# Patient Record
Sex: MALE | Race: WHITE | NOT HISPANIC OR LATINO | Employment: OTHER | URBAN - METROPOLITAN AREA
[De-identification: names, ages, dates, MRNs, and addresses within clinical notes are randomized per-mention and may not be internally consistent; named-entity substitution may affect disease eponyms.]

---

## 2017-08-07 ENCOUNTER — OFFICE VISIT (OUTPATIENT)
Dept: HEMATOLOGY/ONCOLOGY | Facility: CLINIC | Age: 77
End: 2017-08-07
Payer: MEDICARE

## 2017-08-07 VITALS
HEART RATE: 61 BPM | BODY MASS INDEX: 25.54 KG/M2 | DIASTOLIC BLOOD PRESSURE: 66 MMHG | TEMPERATURE: 98 F | WEIGHT: 178 LBS | SYSTOLIC BLOOD PRESSURE: 126 MMHG | RESPIRATION RATE: 20 BRPM

## 2017-08-07 DIAGNOSIS — D75.1 POLYCYTHEMIA, SECONDARY: Primary | ICD-10-CM

## 2017-08-07 DIAGNOSIS — G47.30 SLEEP APNEA IN ADULT: ICD-10-CM

## 2017-08-07 PROBLEM — J98.8: Status: ACTIVE | Noted: 2017-08-07

## 2017-08-07 PROCEDURE — 1159F MED LIST DOCD IN RCRD: CPT | Mod: ,,, | Performed by: INTERNAL MEDICINE

## 2017-08-07 PROCEDURE — 3008F BODY MASS INDEX DOCD: CPT | Mod: ,,, | Performed by: INTERNAL MEDICINE

## 2017-08-07 PROCEDURE — 99214 OFFICE O/P EST MOD 30 MIN: CPT | Mod: ,,, | Performed by: INTERNAL MEDICINE

## 2017-08-07 RX ORDER — LEVOTHYROXINE SODIUM 88 UG/1
TABLET ORAL
COMMUNITY
Start: 2017-06-26 | End: 2019-09-24 | Stop reason: SDUPTHER

## 2017-08-07 RX ORDER — KETOCONAZOLE 20 MG/G
CREAM TOPICAL
COMMUNITY
Start: 2017-05-22 | End: 2018-12-13

## 2017-08-07 RX ORDER — BRIMONIDINE TARTRATE 1.5 MG/ML
SOLUTION/ DROPS OPHTHALMIC
COMMUNITY
Start: 2017-06-29 | End: 2020-09-10

## 2017-08-21 PROBLEM — D75.1 POLYCYTHEMIA, SECONDARY: Status: ACTIVE | Noted: 2017-08-21

## 2017-08-21 PROBLEM — J98.8: Status: RESOLVED | Noted: 2017-08-07 | Resolved: 2017-08-21

## 2017-08-21 PROBLEM — D75.1: Status: RESOLVED | Noted: 2017-08-07 | Resolved: 2017-08-21

## 2017-08-22 NOTE — PROGRESS NOTES
[facility] History & Physical    Subjective:      Patient ID:   Song Lopez  77 y.o. male  1940        Chief Complaint:   Results (hx of polycythemia)      HPI:  77 y.o. male with diagnosis of polycythemia, on chronic phlebotomy schedule.  Orders updated and written for 6-12 months.  Hx of gout, HTN, thyroid dx, and pneumothorax.  No co, SOB no, GAMEZ no.      ROS:   GEN: normal without any fever, night sweats or weight loss  HEENT: normal with no HA's, sore throat, stiff neck, changes in vision  CV: normal with no CP, SOB, PND, GAMEZ or orthopnea  PULM: See HPI.  no SOB, cough, hemoptysis, sputum or pleuritic pain  GI: normal with no abdominal pain, nausea, vomiting, constipation, diarrhea, melanotic stools, BRBPR  : normal with no hematuria, dysuria  BREAST: normal with no mass, discharge, pain  SKIN: normal with no rash, erythema, bruising, or swelling     Past Medical History:   Diagnosis Date    Chronic gout     Hypertension     Polycythemia     Thyroid disease      Past Surgical History:   Procedure Laterality Date    CAROTID ARTERY ANGIOPLASTY  right    carotid endarderectomy      CATARACT EXTRACTION, BILATERAL      CATARACT EXTRACTION, BILATERAL      left wrist surgery         Review of patient's allergies indicates:   Allergen Reactions    Penicillins Other (See Comments)     Skin turns red or appears sunburned     Social History     Social History    Marital status:      Spouse name: N/A    Number of children: N/A    Years of education: N/A     Occupational History    Not on file.     Social History Main Topics    Smoking status: Former Smoker     Packs/day: 1.00     Years: 25.00     Types: Cigarettes    Smokeless tobacco: Never Used    Alcohol use Yes      Comment: 6 per day    Drug use: No    Sexual activity: Not on file     Other Topics Concern    Not on file     Social History Narrative    No narrative on file         Current Outpatient Prescriptions:     allopurinol  (ZYLOPRIM) 100 MG tablet, Take 100 mg by mouth once daily., Disp: , Rfl:     aspirin (ECOTRIN) 81 MG EC tablet, Take 81 mg by mouth once daily., Disp: , Rfl:     brimonidine 0.15 % OPTH DROP (ALPHAGAN) 0.15 % ophthalmic solution, , Disp: , Rfl:     diltiazem (TIAZAC) 360 MG CpSR, Take 360 mg by mouth once daily., Disp: , Rfl:     latanoprost 0.005 % ophthalmic solution, Place 1 drop into both eyes every evening., Disp: , Rfl:     levothyroxine (SYNTHROID) 88 MCG tablet, , Disp: , Rfl:     lovastatin (MEVACOR) 20 MG tablet, Take 20 mg by mouth every evening., Disp: , Rfl:     ketoconazole (NIZORAL) 2 % cream, , Disp: , Rfl:     levothyroxine (SYNTHROID) 100 MCG tablet, Take 100 mcg by mouth once daily., Disp: , Rfl:           Objective:   Vitals:  Blood pressure 126/66, pulse 61, temperature 97.8 °F (36.6 °C), resp. rate 20, weight 80.7 kg (178 lb).    Physical Examination:   GEN: no apparent distress, comfortable; AAOx3  HEAD: atraumatic and normocephalic  EYES: no pallor, no icterus, no francisco j complexion  ENT: OMM, no pharyngeal erythema, no nasal discharge; no thrush  NECK: no masses, thyroid normal,  no LAD/LN's, supple  CV: RRR with no murmur; normal pulse; normal S1 and S2; no pedal edema  CHEST: Normal respiratory effort; CTAB; normal breath sounds; no wheeze or crackles  ABDOM: nontender and nondistended; soft; normal bowel sounds; no rebound/guarding, L/S NP  MUSC/Skeletal: ROM normal; no crepitus; joints normal  EXTREM: no clubbing, cyanosis, inflammation or swelling  SKIN: no rashes, lesions, ulcers, petechiae   : no marcelo  NEURO: grossly intact; motor/sensory WNL; AAOx3; no tremors  PSYCH: normal mood, affect and behavior  LYMPH: normal cervical, supraclavicular, axillary and groin LN's    Labs:     Radiology/Diagnostic Studies:    Assessment:   (1) 77 y.o. male with diagnosis of polycythemia, on phlebotomy schedule.  Orders updated and written         for phlebotomy.   RTC 6-12 years.      (2)Sleep apnea hx., thyroid dx,  HTN.          1. 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 Sleep apnea in adult        Plan:         Return in about 1 year (around 8/7/2018) for check of blood status after therapy.

## 2018-08-06 ENCOUNTER — TELEPHONE (OUTPATIENT)
Dept: HEMATOLOGY/ONCOLOGY | Facility: CLINIC | Age: 78
End: 2018-08-06

## 2018-08-06 ENCOUNTER — OFFICE VISIT (OUTPATIENT)
Dept: HEMATOLOGY/ONCOLOGY | Facility: CLINIC | Age: 78
End: 2018-08-06
Payer: MEDICARE

## 2018-08-06 VITALS
BODY MASS INDEX: 25.58 KG/M2 | SYSTOLIC BLOOD PRESSURE: 99 MMHG | HEIGHT: 69 IN | TEMPERATURE: 98 F | RESPIRATION RATE: 18 BRPM | DIASTOLIC BLOOD PRESSURE: 65 MMHG | HEART RATE: 66 BPM | WEIGHT: 172.69 LBS

## 2018-08-06 DIAGNOSIS — M16.11 OSTEOARTHRITIS OF RIGHT HIP, UNSPECIFIED OSTEOARTHRITIS TYPE: ICD-10-CM

## 2018-08-06 PROCEDURE — 99214 OFFICE O/P EST MOD 30 MIN: CPT | Mod: ,,, | Performed by: INTERNAL MEDICINE

## 2018-08-06 NOTE — PROGRESS NOTES
[facility] History & Physical    Subjective:      Patient ID:   Song Lopez  78 y.o. male  1940  [place]      Chief Complaint:   polycythemia    HPI:  78 y.o. male with diagnosis of polycythemia, on chronic phlebotomy schedule.  Orders updated and written for 12 months.  Hx of gout, HTN, thyroid dx, and pneumothorax.  No co, SOB no, GAMEZ no.  He has sleep apnea syndrome and uses a C Pap machine.    He has some pain symptoms at the right hip, x-ray was done per Dr. Kovacs, he was told that he had early arthritic change at the hip.    ROS:   GEN: normal without any fever, night sweats or weight loss  HEENT: normal with no HA's, sore throat, stiff neck, changes in vision  CV: normal with no CP, SOB, PND, GAMEZ or orthopnea  PULM: See HPI.  no SOB, cough, hemoptysis, sputum or pleuritic pain  GI: normal with no abdominal pain, nausea, vomiting, constipation, diarrhea, melanotic stools, BRBPR  : normal with no hematuria, dysuria  BREAST: normal with no mass, discharge, pain  SKIN: normal with no rash, erythema, bruising, or swelling     Past Medical History:   Diagnosis Date    Chronic gout     Hypertension     Polycythemia     Thyroid disease      Past Surgical History:   Procedure Laterality Date    CAROTID ARTERY ANGIOPLASTY  right    carotid endarderectomy      CATARACT EXTRACTION, BILATERAL      CATARACT EXTRACTION, BILATERAL      left wrist surgery         Review of patient's allergies indicates:   Allergen Reactions    Penicillins Other (See Comments)     Skin turns red or appears sunburned     Social History     Social History    Marital status:      Spouse name: N/A    Number of children: N/A    Years of education: N/A     Occupational History    Not on file.     Social History Main Topics    Smoking status: Former Smoker     Packs/day: 1.00     Years: 25.00     Types: Cigarettes    Smokeless tobacco: Never Used    Alcohol use Yes      Comment: 6 per day    Drug use: No    Sexual  "activity: Not on file     Other Topics Concern    Not on file     Social History Narrative    No narrative on file         Current Outpatient Prescriptions:     allopurinol (ZYLOPRIM) 100 MG tablet, Take 100 mg by mouth once daily., Disp: , Rfl:     aspirin (ECOTRIN) 81 MG EC tablet, Take 81 mg by mouth once daily., Disp: , Rfl:     brimonidine 0.15 % OPTH DROP (ALPHAGAN) 0.15 % ophthalmic solution, , Disp: , Rfl:     diltiazem (TIAZAC) 360 MG CpSR, Take 360 mg by mouth once daily., Disp: , Rfl:     ketoconazole (NIZORAL) 2 % cream, , Disp: , Rfl:     latanoprost 0.005 % ophthalmic solution, Place 1 drop into both eyes every evening., Disp: , Rfl:     levothyroxine (SYNTHROID) 100 MCG tablet, Take 100 mcg by mouth once daily., Disp: , Rfl:     levothyroxine (SYNTHROID) 88 MCG tablet, , Disp: , Rfl:     lovastatin (MEVACOR) 20 MG tablet, Take 20 mg by mouth every evening., Disp: , Rfl:           Objective:   Vitals:  Blood pressure 99/65, pulse 66, temperature 98 °F (36.7 °C), resp. rate 18, height 5' 9" (1.753 m), weight 78.3 kg (172 lb 11.2 oz).    Physical Examination:   GEN: no apparent distress, comfortable  HEAD: atraumatic and normocephalic  EYES: no pallor, no icterus, no francisco j complexion  ENT: no pharyngeal erythema, no nasal discharge  NECK: no masses, thyroid normal,  no LAD/LN's, supple  CV: RRR with no murmur; normal pulse; normal S1 and S2; no pedal edema  CHEST: Normal respiratory effort; CTAB; distant breath sounds; no wheeze or crackles  ABDOM: nontender and nondistended; soft; normal bowel sounds; no rebound/guarding, L/S NP  MUSC/Skeletal: ROM normal; no crepitus; joints normal  EXTREM: no clubbing, cyanosis, inflammation or swelling  SKIN: no rashes, lesions, ulcers, petechiae   : no marcelo  NEURO: grossly intact; motor/sensory WNL; no tremors  PSYCH: normal mood, affect and behavior  LYMPH: normal cervical, supraclavicular, axillary and groin LN's    Lab per Dr. Kovacs's office in July " showed hemoglobin 15.9, hematocrit 52.6, MCV 70, white blood cell count 8400, and platelet count of 395,000. LFTs were normal, TSH was normal, creatinine was normal and urinalysis was unremarkable    Assessment:   (1) 78 y.o. male with diagnosis of polycythemia, on phlebotomy schedule.  Orders updated and written for phlebotomy for 1 year to maintain hematocrit at 48 or less.    (2)Sleep apnea hx., thyroid dx,  HTN.  He uses a C Pap machine.    (3)R hip sx, DJD, as per Dr. Kovacs.      RTC 1 year.

## 2018-08-06 NOTE — LETTER
August 6, 2018      Jake Kovacs MD  [address]  [address]  [facility]  [place] [address]           [facility] - Hematology [facility]  [address]  [address]  [place] [address]  Phone: [phone]  Fax: [phone]          Patient: Song Lopez   MR Number: [id number]   YOB: 1940   Date of Visit: 8/6/2018       Dear Dr. Jake Kovacs:    Thank you for referring Song Lopez to me for evaluation. Attached you will find relevant portions of my assessment and plan of care.    If you have questions, please do not hesitate to call me. I look forward to following Song Lopez along with you.    Sincerely,    SNOW Clark MD    Enclosure  CC:  No Recipients    If you would like to receive this communication electronically, please contact [email or web address] or [phone] to request more information on [employer] access.    For providers and/or their staff who would like to refer a patient to [facility], please contact us through our one-stop-shop provider referral line, [facility], at [phone].    If you feel you have received this communication in error or would no longer like to receive these types of communications, please e-mail [email or web address]

## 2018-08-06 NOTE — TELEPHONE ENCOUNTER
Please get me the report on chest x-ray and CAT scan of chest and right hip x-ray from [facility]

## 2018-09-18 ENCOUNTER — TELEPHONE (OUTPATIENT)
Dept: PAIN MEDICINE | Facility: CLINIC | Age: 78
End: 2018-09-18

## 2018-09-18 NOTE — TELEPHONE ENCOUNTER
----- Message from Bernie Holly sent at 9/18/2018  1:38 PM CDT -----  Contact: Bernie with Dr Jake Gibbs just wants to make sure you received the patients medicals records from his PCP for his appt with the Dr Spears tomorrow.  Call back at [phone] after 2 PM.  Thanks

## 2018-09-18 NOTE — TELEPHONE ENCOUNTER
----- Message from Faith Guerreroan sent at 9/18/2018 11:07 AM CDT -----  Contact: wife Em   Wife Em called, she wanted to make sure Dr. Kovacs office sent over patient test results. Please call back [phone] (home)

## 2018-09-19 ENCOUNTER — OFFICE VISIT (OUTPATIENT)
Dept: PAIN MEDICINE | Facility: CLINIC | Age: 78
End: 2018-09-19
Payer: MEDICARE

## 2018-09-19 VITALS
BODY MASS INDEX: 25.48 KG/M2 | HEIGHT: 69 IN | DIASTOLIC BLOOD PRESSURE: 69 MMHG | WEIGHT: 172 LBS | SYSTOLIC BLOOD PRESSURE: 132 MMHG | HEART RATE: 66 BPM

## 2018-09-19 DIAGNOSIS — M50.30 DDD (DEGENERATIVE DISC DISEASE), CERVICAL: Primary | ICD-10-CM

## 2018-09-19 DIAGNOSIS — M54.12 CERVICAL RADICULITIS: ICD-10-CM

## 2018-09-19 PROCEDURE — 99203 OFFICE O/P NEW LOW 30 MIN: CPT | Mod: S$PBB,,, | Performed by: ANESTHESIOLOGY

## 2018-09-19 PROCEDURE — 99999 PR PBB SHADOW E&M-EST. PATIENT-LVL III: CPT | Mod: PBBFAC,,, | Performed by: ANESTHESIOLOGY

## 2018-09-19 PROCEDURE — 99213 OFFICE O/P EST LOW 20 MIN: CPT | Mod: PBBFAC,PN | Performed by: ANESTHESIOLOGY

## 2018-09-19 PROCEDURE — 1101F PT FALLS ASSESS-DOCD LE1/YR: CPT | Mod: CPTII,,, | Performed by: ANESTHESIOLOGY

## 2018-09-19 RX ORDER — DOXYCYCLINE 100 MG/1
CAPSULE ORAL
COMMUNITY
Start: 2018-08-08 | End: 2018-12-13

## 2018-09-19 RX ORDER — CIPROFLOXACIN 500 MG/1
TABLET ORAL
COMMUNITY
Start: 2018-09-04 | End: 2018-12-13

## 2018-09-19 RX ORDER — METRONIDAZOLE 500 MG/1
TABLET ORAL
COMMUNITY
Start: 2018-09-04 | End: 2018-12-13

## 2018-09-19 RX ORDER — METHYLPREDNISOLONE 4 MG/1
TABLET ORAL
COMMUNITY
Start: 2018-08-13 | End: 2018-12-13

## 2018-09-19 RX ORDER — TIZANIDINE 4 MG/1
TABLET ORAL
COMMUNITY
Start: 2018-08-13 | End: 2020-01-29

## 2018-09-19 NOTE — PROGRESS NOTES
This note was completed with dictation software and grammatical errors may exist.    Referring Physician: Roma Downs    PCP: Jake Kovacs MD      CC:  Right-sided neck pain    HPI:   Song Lopez is a 78 y.o. male presents with right-sided neck pain. Pain has been present for over 3 months.  No recent traumatic incident.  He has intermittent burning, shooting pain in his posterior neck.  Pain radiates to his right shoulder.  Pain worsens with extension, bending, lateral rotation.  Pain improves with rest and heat.  He has tried home exercises with mild benefits.  He recently had x-rays of his cervical spine.  He is referred to us for other treatment options.  However, his neck pain has gradually improved conservative therapy.  He wishes to defer any further interventions at this time. He denies any worsening weakness.  No bowel bladder changes.    ROS:  CONSTITUTIONAL: No fevers, chills, night sweats, wt. loss, appetite changes  SKIN: no rashes or itching  ENT: No headaches, head trauma, vision changes, or eye pain  LYMPH NODES: None noticed   CV: No chest pain, palpitations.   RESP: No shortness of breath, dyspnea on exertion, cough, wheezing, or hemoptysis  GI: No nausea, emesis, diarrhea, constipation, melena, hematochezia, pain.    : No dysuria, hematuria, urgency, or frequency   HEME: No easy bruising, bleeding problems  PSYCHIATRIC: No depression, anxiety, psychosis, hallucinations.  NEURO: No seizures, memory loss, dizziness or difficulty sleeping  MSK:  Positive HPI      Past Medical History:   Diagnosis Date    Chronic gout     Hypertension     Polycythemia     Thyroid disease      Past Surgical History:   Procedure Laterality Date    CAROTID ARTERY ANGIOPLASTY  right    carotid endarderectomy      CATARACT EXTRACTION, BILATERAL      CATARACT EXTRACTION, BILATERAL      left wrist surgery       History reviewed. No pertinent family history.  Social History     Socioeconomic History  "   Marital status:      Spouse name: None    Number of children: None    Years of education: None    Highest education level: None   Social Needs    Financial resource strain: None    Food insecurity - worry: None    Food insecurity - inability: None    Transportation needs - medical: None    Transportation needs - non-medical: None   Occupational History    None   Tobacco Use    Smoking status: Former Smoker     Packs/day: 1.00     Years: 25.00     Pack years: 25.00     Types: Cigarettes    Smokeless tobacco: Never Used   Substance and Sexual Activity    Alcohol use: Yes     Comment: 6 per day    Drug use: No    Sexual activity: None   Other Topics Concern    None   Social History Narrative    None         Medications/Allergies: See med card    Vitals:    09/19/18 1323   BP: 132/69   Pulse: 66   Weight: 78 kg (172 lb)   Height: 5' 9" (1.753 m)   PainSc: 0-No pain   PainLoc: Neck         Physical exam:    GENERAL: A and O x3, the patient appears well groomed and is in no acute distress.  Skin: No rashes or obvious lesions  HEENT: normocephalic, atraumatic  CARDIOVASCULAR:  Palpable peripheral pulses  LUNGS: easy work of breathing  ABDOMEN: soft, nontender   UPPER EXTREMITIES: Normal alignment, normal range of motion, no atrophy, no skin changes,  hair growth and nail growth normal and equal bilaterally. No swelling, no tenderness.    LOWER EXTREMITIES:  Normal alignment, normal range of motion, no atrophy, no skin changes,  hair growth and nail growth normal and equal bilaterally. No swelling, no tenderness.  CERVICAL SPINE:  Cervical spine: ROM is full in flexion, extension and lateral rotation with mild increased pain.  Spurling's maneuver causes mild neck pain to either side.  Myofascial exam: No Tenderness to palpation across cervical paraspinous region bilaterally.    MENTAL STATUS: normal orientation, speech, language, and fund of knowledge for social situation.  Emotional state " appropriate.    CRANIAL NERVES:  II:  PERRL bilaterally,   III,IV,VI: EOMI.    V:  Facial sensation equal bilaterally  VII:  Facial motor function normal.  VIII:  Hearing equal to finger rub bilaterally  IX/X: Gag normal, palate symmetric  XI:  Shoulder shrug equal, head turn equal  XII:  Tongue midline without fasciculations      MOTOR: Tone and bulk: normal bilateral upper and lower Strength: normal   Delt Bi Tri WE WF     R 5 5 5 5 5 5   L 5 5 5 5 5 5     IP ADD ABD Quad TA Gas HAM  R 5 5 5 5 5 5 5  L 5 5 5 5 5 5 5    SENSATION: Light touch and pinprick intact bilaterally  REFLEXES: normal, symmetric, nonbrisk.  Toes down, no clonus. No hoffmans.  GAIT: normal rise, base, steps, and arm swing.        Imaging:  Requesting     Assessment:  Patient referred for neck pain  1. DDD (degenerative disc disease), cervical    2. Cervical radiculitis          Plan:  1. I have stressed the importance of physical activity and exercise to improve overall health  2. Request recent cervical xrays  3.  Neck pain is currently improved conservative therapy.  May consider cervical epidural steroid injection if neck pain worsens.  4.  He will follow up as needed      Thank you for referring this interesting patient, and I look forward to continuing to collaborate in his care.

## 2018-09-26 ENCOUNTER — DOCUMENTATION ONLY (OUTPATIENT)
Dept: PAIN MEDICINE | Facility: CLINIC | Age: 78
End: 2018-09-26

## 2018-12-13 ENCOUNTER — OFFICE VISIT (OUTPATIENT)
Dept: DERMATOLOGY | Facility: CLINIC | Age: 78
End: 2018-12-13
Payer: MEDICARE

## 2018-12-13 VITALS — BODY MASS INDEX: 25.48 KG/M2 | WEIGHT: 172 LBS | HEIGHT: 69 IN

## 2018-12-13 DIAGNOSIS — Z12.83 SKIN CANCER SCREENING: ICD-10-CM

## 2018-12-13 DIAGNOSIS — L57.0 AK (ACTINIC KERATOSIS): Primary | ICD-10-CM

## 2018-12-13 DIAGNOSIS — D49.9 NEOPLASM: ICD-10-CM

## 2018-12-13 PROCEDURE — 99999 PR PBB SHADOW E&M-EST. PATIENT-LVL III: CPT | Mod: PBBFAC,,, | Performed by: DERMATOLOGY

## 2018-12-13 PROCEDURE — 99202 OFFICE O/P NEW SF 15 MIN: CPT | Mod: 25,S$GLB,, | Performed by: DERMATOLOGY

## 2018-12-13 PROCEDURE — 17004 DESTROY PREMAL LESIONS 15/>: CPT | Mod: S$GLB,,, | Performed by: DERMATOLOGY

## 2018-12-13 PROCEDURE — 1101F PT FALLS ASSESS-DOCD LE1/YR: CPT | Mod: CPTII,S$GLB,, | Performed by: DERMATOLOGY

## 2018-12-13 NOTE — LETTER
December 13, 2018      Jake Kovacs MD  [address]  [address]  [facility]  [place] [address]           [place] [facility] D[facility]atology  [address]  [place] [address]  Phone: [phone]          Patient: Song Lopez   MR Number: [id number]   YOB: 1940   Date of Visit: 12/13/2018       Dear Dr. Jake Kovacs:    Thank you for referring Song Lopez to me for evaluation. Attached you will find relevant portions of my assessment and plan of care.    If you have questions, please do not hesitate to call me. I look forward to following Song Lopez along with you.    Sincerely,    Chino Hoff MD    Enclosure  CC:  No Recipients    If you would like to receive this communication electronically, please contact [email or web address] or [phone] to request more information on [employer] Link access.    For providers and/or their staff who would like to refer a patient to [facility], please contact us through our one-stop-shop provider referral line, [facility], at [phone].    If you feel you have received this communication in error or would no longer like to receive these types of communications, please e-mail [email or web address]

## 2018-12-13 NOTE — PATIENT INSTRUCTIONS
CRYOSURGERY      Your doctor has used a method called cryosurgery to treat your skin condition. Cryosurgery refers to the use of very cold substances to treat a variety of skin conditions such as warts, pre-skin cancers, molluscum contagiosum, sun spots, and several benign growths. The substance we use in cryosurgery is liquid nitrogen and is so cold (-195 degrees Celsius) that is burns when administered.     Following treatment in the office, the skin may immediately burn and become red. You may find the area around the lesion is affected as well. It is sometimes necessary to treat not only the lesion, but a small area of the surrounding normal skin to achieve a good response.     A blister, and even a blood filled blister, may form after treatment.   This is a normal response. If the blister is painful, it is acceptable to sterilize a needle and with rubbing alcohol and gently pop the blister. It is important that you gently wash the area with soap and warm water as the blister fluid may contain wart virus if a wart was treated. Do no remove the roof of the blister.     The area treated can take anywhere from 1-3 weeks to heal. Healing time depends on the kind skin lesion treated, the location, and how aggressively the lesion was treated. It is recommended that the areas treated are covered with Vaseline or bacitracin ointment and a band-aid. If a band-aid is not practical, just ointment applied several times per day will do. Keeping these areas moist will speed the healing time.    Treatment with liquid nitrogen can leave a scar. In dark skin, it may be a light or dark scar, in light skin it may be a white or pink scar. These will generally fade with time, but may never go away completely.     If you have any concerns after your treatment, please feel free to call the office.       [address] [facility] [place] [address]/ [phone] FAX/ [email or web address]      Understanding Actinic  Keratosis     Wear a hat that protects your face and ears from the sun.     Actinic keratosis is a skin growth caused by sun damage. These growths are not cancer, but they may develop into skin cancer (precancerous). Many people get these growths, especially as they age. This is because of cumulative sun exposure over years. Multiple growths are called actinic keratoses.  What causes actinic keratosis?  Sun damage causes actinic keratosis. These growths usually appear on skin that is most often exposed to the sun, such as the face, ears, or back of the hands. People who easily sunburn are likely to develop actinic keratoses. Actinic keratoses often appear later in life from cumulative, extensive sun exposure.  What are the symptoms of actinic keratosis?  Actinic keratosis growths may be described as:  · Rough, like sandpaper  · Wart-like  · Scaly or scabby  · More easily felt than seen  They may appear singly or in clusters. They may start out as red patches, and the skin around them may be discolored.  Actinic keratoses usually are not painful. For some people they may make the skin feel tender.  How is actinic keratosis treated?  Because actinic keratoses may develop into skin cancer, a healthcare provider should look at them. Your healthcare provider may choose to remove one or more of these growths. It may be examined under a microscope. This is called a biopsy. You may also wish to have actinic keratoses removed if they bother you. They can be removed in several ways:  · By using a medicine on the skin such as 5 fluorouracil or imiquimod  · By removing them with a scalpel  · By freezing them with liquid nitrogen  How can I prevent actinic keratoses?  Avoiding sun damage to your skin is the best way to prevent actinic keratoses. Here are some ways to protect your skin:  · Use sunscreen with an SPF of 30 or higher on exposed skin when you are outside.  · Wear a hat to protect your face and scalp. Consider wearing  clothing that covers your arms and legs.  · Avoid the sun in the middle of the day, when sunlight is most direct.  · Be aware of how long you have been out in the sun. Reapply sunscreen according to package directions.  · Do not use tanning beds.  What are the complications of actinic keratosis?  Actinic keratoses are a sign of skin damage. They may become cancerous. Its a good idea to ask your healthcare provider to check new skin growths. Report any skin problem that concerns you.  When should I call my healthcare provider?  Call your healthcare provider right away if any of these occur:  · You have an actinic keratosis sore that does not respond to treatment  · You have an actinic keratosis sore that does not heal within a few weeks, or heals and then comes back  · You have a skin growth that is changing in size, shape, or color  · You have a skin growth that looks different on one side from the other  · You have a skin growth that is not the same color throughout   Date Last Reviewed: 5/1/2016  © 2938-2810 [employer]. [address] [place] [address], [place] [address]. All rights reserved. This information is not intended as a substitute for professional medical care. Always follow your healthcare professional's instructions.

## 2018-12-13 NOTE — PROGRESS NOTES
Subjective:       Patient ID:  Song Lopez is a 78 y.o. male who presents for   Chief Complaint   Patient presents with    Spot     scalp     New patient here today for spots to scalp over last 6 months. Itch at times.  Used ketoconazole about 1 year ago per PCP. Helped with itch short term.   Has not used any treatment in recent months. Prior treatment 3 years ago with cryo.      Hx of some type of skin cancer to left neck treated with surgery by Dr. Ross 2015.  Grew up in [place].  .  Never wears hat or sunblock.    Has crusty spot of the right arm x 1-2 months.  Had a skin scrape there that healed.    Has hard spot of the scalp/f head.  No bleed.      Spot         Review of Systems   Constitutional: Negative for fever, weight loss and weight gain.   HENT: Negative for sore throat and mouth sores.    Eyes: Negative for itching and eye watering.   Respiratory: Negative for cough.    Musculoskeletal: Negative for myalgias and arthralgias.   Skin: Positive for itching and dry skin. Negative for daily sunscreen use, activity-related sunscreen use and wears hat.   Hematologic/Lymphatic: Does not bruise/bleed easily.        Objective:    Physical Exam   Constitutional: He appears well-developed and well-nourished.   Eyes: No conjunctival no injection.   Neurological: He is oriented to person, place, and time.   Psychiatric: He has a normal mood and affect.   Skin:   Areas Examined (abnormalities noted in diagram):   Scalp / Hair Palpated and Inspected  Head / Face Inspection Performed  Neck Inspection Performed  RUE Inspected  LUE Inspection Performed  Nails and Digits Inspection Performed                   Diagram Legend     Erythematous scaling macule/papule c/w actinic keratosis       Vascular papule c/w angioma      Pigmented verrucoid papule/plaque c/w seborrheic keratosis      Yellow umbilicated papule c/w sebaceous hyperplasia      Irregularly shaped tan macule c/w lentigo     1-2  mm smooth white papules consistent with Milia      Movable subcutaneous cyst with punctum c/w epidermal inclusion cyst      Subcutaneous movable cyst c/w pilar cyst      Firm pink to brown papule c/w dermatofibroma      Pedunculated fleshy papule(s) c/w skin tag(s)      Evenly pigmented macule c/w junctional nevus     Mildly variegated pigmented, slightly irregular-bordered macule c/w mildly atypical nevus      Flesh colored to evenly pigmented papule c/w intradermal nevus       Pink pearly papule/plaque c/w basal cell carcinoma      Erythematous hyperkeratotic cursted plaque c/w SCC      Surgical scar with no sign of skin cancer recurrence      Open and closed comedones      Inflammatory papules and pustules      Verrucoid papule consistent consistent with wart     Erythematous eczematous patches and plaques     Dystrophic onycholytic nail with subungual debris c/w onychomycosis     Umbilicated papule    Erythematous-base heme-crusted tan verrucoid plaque consistent with inflamed seborrheic keratosis     Erythematous Silvery Scaling Plaque c/w Psoriasis     See annotation      Assessment / Plan:        AK (actinic keratosis)  Cryosurgery Procedure Note    Verbal consent from the patient is obtained including, but not limited to, risk of hypopigmentation/hyperpigmentation, scar, recurrence of lesion. The patient is aware of the precancerous quality and need for treatment of these lesions. Liquid nitrogen cryosurgery is applied to the 17 actinic keratoses, as detailed in the physical exam, to produce a freeze injury. The patient is aware that blisters may form and is instructed on wound care with gentle cleansing and use of vaseline ointment to keep moist until healed. The patient is supplied a handout on cryosurgery and is instructed to call if lesions do not completely resolve.  Discussed all of the following:  Actinic keratosis is a skin growth caused by sun damage. These growths are not cancer, but they may  develop into skin cancer (precancerous). Many people get these growths, especially as they age. This is because of cumulative sun exposure over years. Multiple growths are called actinic keratoses.    Patient instructed in importance in daily sun protection of at least spf 30. Sun avoidance and topical protection discussed.     Patient encouraged to wear hat for all outdoor exposure.     Also discussed sun protective clothing.    Neoplasm  Watch frontal scalp crust.  Cryo today.  May need bx if not resolved.  Also watch r f arm.  Cryo today.    Skin cancer screening  Plan at fu appt.  Recommended to pt.         Follow-up in about 6 weeks (around 1/24/2019).

## 2019-01-15 ENCOUNTER — TELEPHONE (OUTPATIENT)
Dept: DERMATOLOGY | Facility: CLINIC | Age: 79
End: 2019-01-15

## 2019-01-15 NOTE — TELEPHONE ENCOUNTER
Patient states he received letter regarding appt in mail stating he needs to bring all mr and imaging to appt.  INformed patient this is a general letter sent by someone meaning if the record is pertinent to appt--like seeing ortho and had an xray, but in this case, no records needed.

## 2019-01-21 ENCOUNTER — OFFICE VISIT (OUTPATIENT)
Dept: DERMATOLOGY | Facility: CLINIC | Age: 79
End: 2019-01-21
Payer: MEDICARE

## 2019-01-21 DIAGNOSIS — D49.9 NEOPLASM: ICD-10-CM

## 2019-01-21 DIAGNOSIS — L57.0 AK (ACTINIC KERATOSIS): Primary | ICD-10-CM

## 2019-01-21 PROCEDURE — 11102 PR TANGENTIAL BIOPSY, SKIN, SINGLE LESION: ICD-10-PCS | Mod: S$GLB,,, | Performed by: DERMATOLOGY

## 2019-01-21 PROCEDURE — 99999 PR PBB SHADOW E&M-EST. PATIENT-LVL I: CPT | Mod: PBBFAC,,, | Performed by: DERMATOLOGY

## 2019-01-21 PROCEDURE — 99213 OFFICE O/P EST LOW 20 MIN: CPT | Mod: 25,S$GLB,, | Performed by: DERMATOLOGY

## 2019-01-21 PROCEDURE — 99999 PR PBB SHADOW E&M-EST. PATIENT-LVL I: ICD-10-PCS | Mod: PBBFAC,,, | Performed by: DERMATOLOGY

## 2019-01-21 PROCEDURE — 88305 TISSUE EXAM BY PATHOLOGIST: CPT | Performed by: PATHOLOGY

## 2019-01-21 PROCEDURE — 1101F PT FALLS ASSESS-DOCD LE1/YR: CPT | Mod: CPTII,S$GLB,, | Performed by: DERMATOLOGY

## 2019-01-21 PROCEDURE — 99213 PR OFFICE/OUTPT VISIT, EST, LEVL III, 20-29 MIN: ICD-10-PCS | Mod: 25,S$GLB,, | Performed by: DERMATOLOGY

## 2019-01-21 PROCEDURE — 88305 TISSUE SPECIMEN TO PATHOLOGY, DERMATOLOGY: ICD-10-PCS | Mod: 26,,, | Performed by: PATHOLOGY

## 2019-01-21 PROCEDURE — 1101F PR PT FALLS ASSESS DOC 0-1 FALLS W/OUT INJ PAST YR: ICD-10-PCS | Mod: CPTII,S$GLB,, | Performed by: DERMATOLOGY

## 2019-01-21 PROCEDURE — 11102 TANGNTL BX SKIN SINGLE LES: CPT | Mod: S$GLB,,, | Performed by: DERMATOLOGY

## 2019-01-21 NOTE — PROGRESS NOTES
Subjective:       Patient ID:  Song Lopez is a 78 y.o. male who presents for No chief complaint on file.    12/2018  AK (actinic keratosis)  Cryosurgery Procedure Note     Verbal consent from the patient is obtained including, but not limited to, risk of hypopigmentation/hyperpigmentation, scar, recurrence of lesion. The patient is aware of the precancerous quality and need for treatment of these lesions. Liquid nitrogen cryosurgery is applied to the 17 actinic keratoses, as detailed in the physical exam, to produce a freeze injury. The patient is aware that blisters may form and is instructed on wound care with gentle cleansing and use of vaseline ointment to keep moist until healed. The patient is supplied a handout on cryosurgery and is instructed to call if lesions do not completely resolve.  Discussed all of the following:  Actinic keratosis is a skin growth caused by sun damage. These growths are not cancer, but they may develop into skin cancer (precancerous). Many people get these growths, especially as they age. This is because of cumulative sun exposure over years. Multiple growths are called actinic keratoses.     Patient instructed in importance in daily sun protection of at least spf 30. Sun avoidance and topical protection discussed.      Patient encouraged to wear hat for all outdoor exposure.      Also discussed sun protective clothing.     Neoplasm  Watch frontal scalp crust.  Cryo today.  May need bx if not resolved.  Also watch r f arm.  Cryo today.     Skin cancer screening  Plan at  appt.  Recommended to pt.    Returns today for follow up Ak's.  Right temple previously treated with cryo remains scaly. Previously treated area to right arm gone.          Review of Systems   Constitutional: Negative for fever.   HENT: Negative for mouth sores.    Eyes: Negative for itching and eye watering.   Respiratory: Negative for cough.    Genitourinary: Negative for genital sores and genital itching.         Objective:    Physical Exam   Constitutional: He appears well-developed and well-nourished.   Eyes: No conjunctival no injection.   Neurological: He is alert and oriented to person, place, and time.   Psychiatric: He has a normal mood and affect.   Skin:   Areas Examined (abnormalities noted in diagram):   Scalp / Hair Palpated and Inspected  Head / Face Inspection Performed  Neck Inspection Performed  RUE Inspected  LUE Inspection Performed                   Diagram Legend     Erythematous scaling macule/papule c/w actinic keratosis       Vascular papule c/w angioma      Pigmented verrucoid papule/plaque c/w seborrheic keratosis      Yellow umbilicated papule c/w sebaceous hyperplasia      Irregularly shaped tan macule c/w lentigo     1-2 mm smooth white papules consistent with Milia      Movable subcutaneous cyst with punctum c/w epidermal inclusion cyst      Subcutaneous movable cyst c/w pilar cyst      Firm pink to brown papule c/w dermatofibroma      Pedunculated fleshy papule(s) c/w skin tag(s)      Evenly pigmented macule c/w junctional nevus     Mildly variegated pigmented, slightly irregular-bordered macule c/w mildly atypical nevus      Flesh colored to evenly pigmented papule c/w intradermal nevus       Pink pearly papule/plaque c/w basal cell carcinoma      Erythematous hyperkeratotic cursted plaque c/w SCC      Surgical scar with no sign of skin cancer recurrence      Open and closed comedones      Inflammatory papules and pustules      Verrucoid papule consistent consistent with wart     Erythematous eczematous patches and plaques     Dystrophic onycholytic nail with subungual debris c/w onychomycosis     Umbilicated papule    Erythematous-base heme-crusted tan verrucoid plaque consistent with inflamed seborrheic keratosis     Erythematous Silvery Scaling Plaque c/w Psoriasis     See annotation          Assessment / Plan:      Pathology Orders:     Normal Orders This Visit    Tissue Specimen To  Pathology, Dermatology     Questions:    Directional Terms:  Other(comment)    Clinical information:  ro scc    Specific Site:  r hairline/temple        AK (actinic keratosis)  Discussed all of the following:  Actinic keratosis is a skin growth caused by sun damage. These growths are not cancer, but they may develop into skin cancer (precancerous). Many people get these growths, especially as they age. This is because of cumulative sun exposure over years. Multiple growths are called actinic keratoses.  Better sp cryo!  Patient instructed to start Amlactin cream or lotion nightly to AK prone areas.  Warned of skin irritation and to decrease frequency of usage if this occurs.  Watch f head, scalp, arms.    Neoplasm  Shave biopsy procedure note:    Shave biopsy performed after verbal consent including risk of infection, scar, recurrence, need for additional treatment of site. Area prepped with alcohol, anesthetized with approximately 1.0cc of 1% lidocaine with epinephrine. Lesional tissue shaved with razor blade. Hemostasis achieved with application of aluminum chloride followed by hyfrecation. No complications. Dressing applied. Wound care explained.    Discussed with patient the etiology and pathogenesis of the disease and possible treatments and aggravators.    Discussed Mohs surgery with patient if biopsy is positive for skin cancer.  Reviewed options of doing Mohs with Dr. Vance in [place] versus with Dr. Orourke in [place] versus regular excision with me here in [place].  Patient to consider.             Follow-up if symptoms worsen or fail to improve, for Post biopsy results.

## 2019-01-21 NOTE — PATIENT INSTRUCTIONS

## 2019-02-06 ENCOUNTER — PROCEDURE VISIT (OUTPATIENT)
Dept: DERMATOLOGY | Facility: CLINIC | Age: 79
End: 2019-02-06
Payer: MEDICARE

## 2019-02-06 VITALS — WEIGHT: 172 LBS | HEIGHT: 69 IN | BODY MASS INDEX: 25.48 KG/M2

## 2019-02-06 DIAGNOSIS — C44.320 SCC (SQUAMOUS CELL CARCINOMA), FACE: Primary | ICD-10-CM

## 2019-02-06 PROCEDURE — 99499 UNLISTED E&M SERVICE: CPT | Mod: S$GLB,,, | Performed by: DERMATOLOGY

## 2019-02-06 PROCEDURE — 99499 NO LOS: ICD-10-PCS | Mod: S$GLB,,, | Performed by: DERMATOLOGY

## 2019-02-06 PROCEDURE — 88305 TISSUE SPECIMEN TO PATHOLOGY, DERMATOLOGY: ICD-10-PCS | Mod: 26,,, | Performed by: PATHOLOGY

## 2019-02-06 PROCEDURE — 12051 INTMD RPR FACE/MM 2.5 CM/<: CPT | Mod: 51,S$GLB,, | Performed by: DERMATOLOGY

## 2019-02-06 PROCEDURE — 12051 PR INTERMED WOUND REPAIR FACE/EAR/EYELID/NOSE/LIP/MUC MEBR, 2.5CM OR LESS: ICD-10-PCS | Mod: 51,S$GLB,, | Performed by: DERMATOLOGY

## 2019-02-06 PROCEDURE — 11642 EXC F/E/E/N/L MAL+MRG 1.1-2: CPT | Mod: S$GLB,,, | Performed by: DERMATOLOGY

## 2019-02-06 PROCEDURE — 11642 PR EXC SKIN MALIG 1.1-2 CM FACE,FACIAL: ICD-10-PCS | Mod: S$GLB,,, | Performed by: DERMATOLOGY

## 2019-02-06 PROCEDURE — 88305 TISSUE EXAM BY PATHOLOGIST: CPT | Performed by: PATHOLOGY

## 2019-02-06 NOTE — PATIENT INSTRUCTIONS

## 2019-02-06 NOTE — PROGRESS NOTES
PROCEDURE: Elliptical excision with complex layered repair in order to close large gap.    ANESTHETIC: 2 cc 1% Xylocaine with Epinephrine 1:100,000, buffered    SURGEON:  Chino Hoff M.D.    ASSISTANTS:  Luz Alvarez LPN    PREOPERATIVE DIAGNOSIS:  Biopsy-proven Squamous Cell Carcinoma    POSTOPERATIVE DIAGNOSIS:  Same as preoperative diagnosis    PATHOLOGIC DIAGNOSIS: Pending    LOCATION: r temple/hairline    INITIAL LESION SIZE: 0.7 cm    EXCISED DIAMETER: 1.7 cm    PREPARATION: The diagnosis, procedure, alternatives, benefits and risks, including but not limited to: infection, bleeding/bruising, drug reactions, pain, scar or cosmetic defect, local sensation disturbances, wound dehiscence (separation of wound edges after sutures removed) and/or recurrence of present condition were explained to the patient. The patient elected to proceed.  Patient's identity was verified and the site was verified.    PROCEDURE: The location noted above was prepped, draped, and anesthetized in the usual sterile fashion per Luz Alvarez LPN. Lesional tissue was carefully marked with at least 5 mm margins of clinically normal skin in all directions. A fusiform elliptical excision was done with #15 blade carried down completely through the dermis into the deep subcutaneous tissues to the level of the non-muscle fascia, and dissection was carried out in that plane. The wound was widely undermined in all directions. Electrocoagulation was used to obtain hemostasis. Blood loss was minimal. The wound was then approximated in a layered fashion with subcutaneous and intradermal sutures of 4.0 Monocryl, approximately 3 in number, and the wound was then superficially closed with simple interrupted sutures of 4.0 Prolene.    The patient tolerated the procedure well.    The area was cleaned and dressed appropriately and the patient was given wound care instructions, as well as an appointment for follow-up evaluation.    LENGTH OF REPAIR: 2.3  cm

## 2019-02-18 ENCOUNTER — CLINICAL SUPPORT (OUTPATIENT)
Dept: DERMATOLOGY | Facility: CLINIC | Age: 79
End: 2019-02-18
Payer: MEDICARE

## 2019-02-18 DIAGNOSIS — Z48.02 VISIT FOR SUTURE REMOVAL: Primary | ICD-10-CM

## 2019-02-18 PROCEDURE — 99024 PR POST-OP FOLLOW-UP VISIT: ICD-10-PCS | Mod: S$GLB,,, | Performed by: DERMATOLOGY

## 2019-02-18 PROCEDURE — 99999 PR PBB SHADOW E&M-EST. PATIENT-LVL II: ICD-10-PCS | Mod: PBBFAC,,,

## 2019-02-18 PROCEDURE — 99999 PR PBB SHADOW E&M-EST. PATIENT-LVL II: CPT | Mod: PBBFAC,,,

## 2019-02-18 PROCEDURE — 99024 POSTOP FOLLOW-UP VISIT: CPT | Mod: S$GLB,,, | Performed by: DERMATOLOGY

## 2019-04-03 ENCOUNTER — OFFICE VISIT (OUTPATIENT)
Dept: PODIATRY | Facility: CLINIC | Age: 79
End: 2019-04-03
Payer: MEDICARE

## 2019-04-03 VITALS
DIASTOLIC BLOOD PRESSURE: 82 MMHG | HEIGHT: 69 IN | WEIGHT: 172 LBS | BODY MASS INDEX: 25.48 KG/M2 | SYSTOLIC BLOOD PRESSURE: 132 MMHG | TEMPERATURE: 99 F | HEART RATE: 71 BPM

## 2019-04-03 DIAGNOSIS — M79.671 RIGHT FOOT PAIN: ICD-10-CM

## 2019-04-03 DIAGNOSIS — L98.9 BENIGN SKIN LESION: Primary | ICD-10-CM

## 2019-04-03 PROCEDURE — 1101F PT FALLS ASSESS-DOCD LE1/YR: CPT | Mod: ,,, | Performed by: PODIATRIST

## 2019-04-03 PROCEDURE — 99203 PR OFFICE/OUTPT VISIT, NEW, LEVL III, 30-44 MIN: ICD-10-PCS | Mod: 25,,, | Performed by: PODIATRIST

## 2019-04-03 PROCEDURE — 1101F PR PT FALLS ASSESS DOC 0-1 FALLS W/OUT INJ PAST YR: ICD-10-PCS | Mod: ,,, | Performed by: PODIATRIST

## 2019-04-03 PROCEDURE — 99203 OFFICE O/P NEW LOW 30 MIN: CPT | Mod: 25,,, | Performed by: PODIATRIST

## 2019-04-03 PROCEDURE — 17110 DESTRUCTION B9 LES UP TO 14: CPT | Mod: ,,, | Performed by: PODIATRIST

## 2019-04-03 PROCEDURE — 17110 PR DESTRUCTION BENIGN LESIONS UP TO 14: ICD-10-PCS | Mod: ,,, | Performed by: PODIATRIST

## 2019-04-03 RX ORDER — PANTOPRAZOLE SODIUM 40 MG/1
TABLET, DELAYED RELEASE ORAL
COMMUNITY
End: 2020-01-29

## 2019-04-03 NOTE — PROGRESS NOTES
[address] [facility] [address]  [place] [address]  Phone: [phone]   [phone]    Patient's PCP:Jake Kovacs MD  Referring Provider: No ref. provider found    Subjective:      Chief Complaint:: Callouses (Right 4th mpj)    HPI  Song Lopez is a 78 y.o. male who presents with a complaint of Painful spot on right fourth mpj lasting for 1 month,had years ago and came back. Onset of the symptoms was random.  Current symptoms include pain if barefoot.  Aggravating factors are barefoot walking. Symptoms have increased.Treatment to date have included got cut out years ago. Patients rates pain 6/10 on pain scale.    Vitals:    04/03/19 0948   BP: 132/82   Pulse: 71   Temp: 98.5 °F (36.9 °C)     Shoe Size: 11    Past Surgical History:   Procedure Laterality Date    CAROTID ARTERY ANGIOPLASTY  right    carotid endarderectomy      CATARACT EXTRACTION, BILATERAL      CATARACT EXTRACTION, BILATERAL      left wrist surgery       Past Medical History:   Diagnosis Date    Chronic gout     Hypertension     Polycythemia     Thyroid disease      History reviewed. No pertinent family history.     Social History:   Marital Status:   Alcohol History:  reports that he drinks alcohol.  Tobacco History:  reports that he has quit smoking. His smoking use included cigarettes. He has a 25.00 pack-year smoking history. He has never used smokeless tobacco.  Drug History:  reports that he does not use drugs.    Review of patient's allergies indicates:   Allergen Reactions    Penicillins Other (See Comments)     Skin turns red or appears sunburned       Current Outpatient Medications   Medication Sig Dispense Refill    allopurinol (ZYLOPRIM) 100 MG tablet Take 100 mg by mouth once daily.      aspirin (ECOTRIN) 81 MG EC tablet Take 81 mg by mouth once daily.      brimonidine 0.15 % OPTH DROP (ALPHAGAN) 0.15 % ophthalmic solution       diltiazem (TIAZAC) 360 MG CpSR Take 360 mg by mouth once daily.       latanoprost 0.005 % ophthalmic solution Place 1 drop into both eyes every evening.      levothyroxine (SYNTHROID) 88 MCG tablet       lovastatin (MEVACOR) 20 MG tablet Take 20 mg by mouth every evening.      pantoprazole (PROTONIX) 40 MG tablet pantoprazole 40 mg tablet,delayed release   TAKE 1 TABLET BY MOUTH TWICE DAILY FOR 14 DAYS THEN TAKE ONE TABLET BY MOUTH DAILY      tiZANidine (ZANAFLEX) 4 MG tablet        No current facility-administered medications for this visit.        Review of Systems      Objective:        Physical Exam:   Foot Exam    General  General Appearance: appears stated age and healthy   Orientation: alert and oriented to person, place, and time   Affect: appropriate   Gait: unimpaired       Right Foot/Ankle     Inspection and Palpation  Ecchymosis: none  Tenderness: (Plantar foot between fourth and fifth metatarsal heads)  Swelling: none   Arch: normal  Skin Exam: (Benign keratotic skin lesion present plantar foot between fourth and fifth metatarsal heads)    Neurovascular  Dorsalis pedis: 2+  Posterior tibial: 2+  Saphenous nerve sensation: normal  Tibial nerve sensation: normal  Superficial peroneal nerve sensation: normal  Deep peroneal nerve sensation: normal  Sural nerve sensation: normal    Muscle Strength  Ankle dorsiflexion: 5  Ankle plantar flexion: 5  Ankle inversion: 5  Ankle eversion: 5  Great toe extension: 5  Great toe flexion: 5          Physical Exam   Cardiovascular:   Pulses:       Dorsalis pedis pulses are 2+ on the right side.        Posterior tibial pulses are 2+ on the right side.   Musculoskeletal:        Feet:        Imaging: none            Assessment:       1. Benign skin lesion - Right Foot    2. Right foot pain      Plan:   Benign skin lesion - Right Foot    Right foot pain      Follow up in about 2 weeks (around 4/17/2019).    Procedures - [facility] treatment of benign skin lesions - single lesion plantar right foot. Informed consent was obtained,  hyperkeratotic skin was debrided from each lesion utilizing a scapel, Cantharone acid was applied, and was covered with a Band-Aid. Written and verbal structures are given to the patient. Patient tolerated the procedure well.     Instructions After Cantharone Acid Treatment    1. Keep dry for 3 days  2. If a blister forms, pop it  3. After 3rd day, begin wart stick twice daily for two week  4. Follow-up appointment 2 weeks      Wart Treatment: Twice Daily    1. Bathe area at night  2. File with pumice stone or nail file  3. Apply wart stick to affected area  4. Cover with a bad aid        Counseling:     I provided patient education verbally regarding:   Patient diagnosis, treatment options, as well as alternatives, risks, and benefits.     benign skin lesion:  I explained the lesion to the pt. and the treatment options of 1)  periodic debridement and off loading of the lesion.  2)  Canthrone treatment plan,  3)  Curretage of the lesion.  I explained there was no guarantee with any of the treatments that the lesion would comletely resolve or not reoccur.      This note was created using Dragon voice recognition software that occasionally misinterpreted phrases or words.

## 2019-04-17 ENCOUNTER — OFFICE VISIT (OUTPATIENT)
Dept: PODIATRY | Facility: CLINIC | Age: 79
End: 2019-04-17
Payer: MEDICARE

## 2019-04-17 VITALS
HEART RATE: 56 BPM | WEIGHT: 172 LBS | BODY MASS INDEX: 25.48 KG/M2 | HEIGHT: 69 IN | DIASTOLIC BLOOD PRESSURE: 72 MMHG | SYSTOLIC BLOOD PRESSURE: 123 MMHG

## 2019-04-17 DIAGNOSIS — M79.671 RIGHT FOOT PAIN: ICD-10-CM

## 2019-04-17 DIAGNOSIS — L98.9 BENIGN SKIN LESION: Primary | ICD-10-CM

## 2019-04-17 PROCEDURE — 1101F PT FALLS ASSESS-DOCD LE1/YR: CPT | Mod: ,,, | Performed by: PODIATRIST

## 2019-04-17 PROCEDURE — 99213 OFFICE O/P EST LOW 20 MIN: CPT | Mod: ,,, | Performed by: PODIATRIST

## 2019-04-17 PROCEDURE — 1101F PR PT FALLS ASSESS DOC 0-1 FALLS W/OUT INJ PAST YR: ICD-10-PCS | Mod: ,,, | Performed by: PODIATRIST

## 2019-04-17 PROCEDURE — 99213 PR OFFICE/OUTPT VISIT, EST, LEVL III, 20-29 MIN: ICD-10-PCS | Mod: ,,, | Performed by: PODIATRIST

## 2019-04-17 NOTE — PROGRESS NOTES
[address] [facility] [address] [place] [address]  Phone: [phone]   [phone]    Patient's PCP:Jake Kovacs MD  Referring Provider: No ref. provider found    Subjective:      Chief Complaint:: Follow-up (canthrone right)    ARMANI Lopez is a 78 y.o. male who presents with a follow up from canthrone right foot. Patient states he has been following the home care  Instructions. He denies any pain currently.     Vitals:    04/17/19 0910   BP: 123/72   Pulse: (!) 56     Shoe Size:     Past Surgical History:   Procedure Laterality Date    CAROTID ARTERY ANGIOPLASTY  right    carotid endarderectomy      CATARACT EXTRACTION, BILATERAL      CATARACT EXTRACTION, BILATERAL      left wrist surgery       Past Medical History:   Diagnosis Date    Chronic gout     Hypertension     Polycythemia     Thyroid disease      History reviewed. No pertinent family history.     Social History:   Marital Status:   Alcohol History:  reports that he drinks alcohol.  Tobacco History:  reports that he has quit smoking. His smoking use included cigarettes. He has a 25.00 pack-year smoking history. He has never used smokeless tobacco.  Drug History:  reports that he does not use drugs.    Review of patient's allergies indicates:   Allergen Reactions    Penicillins Other (See Comments)     Skin turns red or appears sunburned       Current Outpatient Medications   Medication Sig Dispense Refill    allopurinol (ZYLOPRIM) 100 MG tablet Take 100 mg by mouth once daily.      aspirin (ECOTRIN) 81 MG EC tablet Take 81 mg by mouth once daily.      brimonidine 0.15 % OPTH DROP (ALPHAGAN) 0.15 % ophthalmic solution       diltiazem (TIAZAC) 360 MG CpSR Take 360 mg by mouth once daily.      latanoprost 0.005 % ophthalmic solution Place 1 drop into both eyes every evening.      levothyroxine (SYNTHROID) 88 MCG tablet       lovastatin (MEVACOR) 20 MG tablet Take 20 mg by mouth every evening.      pantoprazole  (PROTONIX) 40 MG tablet pantoprazole 40 mg tablet,delayed release   TAKE 1 TABLET BY MOUTH TWICE DAILY FOR 14 DAYS THEN TAKE ONE TABLET BY MOUTH DAILY      tiZANidine (ZANAFLEX) 4 MG tablet        No current facility-administered medications for this visit.        Review of Systems   Constitutional: Negative for chills, fatigue, fever and unexpected weight change.   HENT: Negative for hearing loss and trouble swallowing.    Eyes: Negative for photophobia and visual disturbance.   Respiratory: Negative for cough, shortness of breath and wheezing.    Cardiovascular: Negative for chest pain, palpitations and leg swelling.   Gastrointestinal: Negative for abdominal pain and nausea.   Genitourinary: Negative for dysuria and frequency.   Musculoskeletal: Negative for arthralgias, back pain and joint swelling.   Skin: Negative for rash.   Neurological: Negative for tremors, seizures, weakness, numbness and headaches.   Hematological: Does not bruise/bleed easily.         Objective:        Physical Exam:   Foot Exam    General  General Appearance: appears stated age and healthy   Orientation: alert and oriented to person, place, and time   Affect: appropriate   Gait: unimpaired       Right Foot/Ankle     Inspection and Palpation  Skin Exam: skin intact; no ulcer and no erythema     Neurovascular  Dorsalis pedis: 2+  Posterior tibial: 2+  Saphenous nerve sensation: normal  Tibial nerve sensation: normal  Superficial peroneal nerve sensation: normal  Deep peroneal nerve sensation: normal  Sural nerve sensation: normal    Comments  Cantherone chemical destruction of skin lesion right foot shows healed with no lesion present           Physical Exam   Cardiovascular:   Pulses:       Dorsalis pedis pulses are 2+ on the right side.        Posterior tibial pulses are 2+ on the right side.   Feet:   Right Foot:   Skin Integrity: Negative for ulcer or erythema.       Imaging: none            Assessment:       1. Benign skin lesion -  Right Foot    2. Right foot pain      Plan:   Benign skin lesion - Right Foot    Right foot pain      Follow up if symptoms worsen or fail to improve.    Procedures - None    Okay to discontinue wart stick  Pumice stone to prevent callus buildup    Counseling:     I provided patient education verbally regarding:   Patient diagnosis, treatment options, as well as alternatives, risks, and benefits.     This note was created using Dragon voice recognition software that occasionally misinterpreted phrases or words.

## 2019-04-25 ENCOUNTER — OFFICE VISIT (OUTPATIENT)
Dept: DERMATOLOGY | Facility: CLINIC | Age: 79
End: 2019-04-25
Payer: MEDICARE

## 2019-04-25 VITALS — WEIGHT: 172 LBS | HEIGHT: 69 IN | BODY MASS INDEX: 25.48 KG/M2

## 2019-04-25 DIAGNOSIS — D49.9 NEOPLASM: Primary | ICD-10-CM

## 2019-04-25 DIAGNOSIS — Z85.828 HISTORY OF SCC (SQUAMOUS CELL CARCINOMA) OF SKIN: ICD-10-CM

## 2019-04-25 DIAGNOSIS — L57.0 AK (ACTINIC KERATOSIS): ICD-10-CM

## 2019-04-25 DIAGNOSIS — L81.4 LENTIGO: ICD-10-CM

## 2019-04-25 PROCEDURE — 99999 PR PBB SHADOW E&M-EST. PATIENT-LVL III: CPT | Mod: PBBFAC,,, | Performed by: DERMATOLOGY

## 2019-04-25 PROCEDURE — 88305 TISSUE SPECIMEN TO PATHOLOGY, DERMATOLOGY: ICD-10-PCS | Mod: 26,,, | Performed by: PATHOLOGY

## 2019-04-25 PROCEDURE — 11102 TANGNTL BX SKIN SINGLE LES: CPT | Mod: S$GLB,,, | Performed by: DERMATOLOGY

## 2019-04-25 PROCEDURE — 99999 PR PBB SHADOW E&M-EST. PATIENT-LVL III: ICD-10-PCS | Mod: PBBFAC,,, | Performed by: DERMATOLOGY

## 2019-04-25 PROCEDURE — 11102 PR TANGENTIAL BIOPSY, SKIN, SINGLE LESION: ICD-10-PCS | Mod: S$GLB,,, | Performed by: DERMATOLOGY

## 2019-04-25 PROCEDURE — 1101F PT FALLS ASSESS-DOCD LE1/YR: CPT | Mod: CPTII,S$GLB,, | Performed by: DERMATOLOGY

## 2019-04-25 PROCEDURE — 99213 PR OFFICE/OUTPT VISIT, EST, LEVL III, 20-29 MIN: ICD-10-PCS | Mod: 25,S$GLB,, | Performed by: DERMATOLOGY

## 2019-04-25 PROCEDURE — 88305 TISSUE EXAM BY PATHOLOGIST: CPT | Performed by: PATHOLOGY

## 2019-04-25 PROCEDURE — 99213 OFFICE O/P EST LOW 20 MIN: CPT | Mod: 25,S$GLB,, | Performed by: DERMATOLOGY

## 2019-04-25 PROCEDURE — 1101F PR PT FALLS ASSESS DOC 0-1 FALLS W/OUT INJ PAST YR: ICD-10-PCS | Mod: CPTII,S$GLB,, | Performed by: DERMATOLOGY

## 2019-04-25 NOTE — PROGRESS NOTES
Patient office visit 01/21/2019    Pathology Orders:      Normal Orders This Visit     Tissue Specimen To Pathology, Dermatology      Questions:     Directional Terms:  Other(comment)     Clinical information:  ro scc     Specific Site:  r hairline/temple          AK (actinic keratosis)  Discussed all of the following:  Actinic keratosis is a skin growth caused by sun damage. These growths are not cancer, but they may develop into skin cancer (precancerous). Many people get these growths, especially as they age. This is because of cumulative sun exposure over years. Multiple growths are called actinic keratoses.  Better sp cryo!  Patient instructed to start Amlactin cream or lotion nightly to AK prone areas.  Warned of skin irritation and to decrease frequency of usage if this occurs.  Watch f head, scalp, arms.     Neoplasm  Shave biopsy procedure note:     Shave biopsy performed after verbal consent including risk of infection, scar, recurrence, need for additional treatment of site. Area prepped with alcohol, anesthetized with approximately 1.0cc of 1% lidocaine with epinephrine. Lesional tissue shaved with razor blade. Hemostasis achieved with application of aluminum chloride followed by hyfrecation. No complications. Dressing applied. Wound care explained.     Discussed with patient the etiology and pathogenesis of the disease and possible treatments and aggravators.    Discussed Mohs surgery with patient if biopsy is positive for skin cancer.  Reviewed options of doing Mohs with Dr. Vance in [place] versus with Dr. Orourke in [place] versus regular excision with me here in [place].  Patient to consider.     Returns today for follow up. Unable to use amlactin cream due to burning in eyes and glaucoma.  Avoids sun but no protective clothing.                           Subjective:       Patient ID:  Song Lopez Jr. is a 78 y.o. male who presents for   Chief Complaint   Patient presents with    Actinic  Keratosis     follow up amlactin     HPI    Review of Systems   Constitutional: Negative for fever.   HENT: Negative for mouth sores.    Eyes: Negative for itching and eye watering.   Respiratory: Negative for cough.    Genitourinary: Negative for genital sores and genital itching.        Objective:    Physical Exam   Constitutional: He appears well-developed and well-nourished.   Eyes: No conjunctival no injection.   Neurological: He is alert and oriented to person, place, and time.   Psychiatric: He has a normal mood and affect.   Skin:   Areas Examined (abnormalities noted in diagram):   Scalp / Hair Palpated and Inspected  Head / Face Inspection Performed  Neck Inspection Performed  RUE Inspected  LUE Inspection Performed  Nails and Digits Inspection Performed                       Diagram Legend     Erythematous scaling macule/papule c/w actinic keratosis       Vascular papule c/w angioma      Pigmented verrucoid papule/plaque c/w seborrheic keratosis      Yellow umbilicated papule c/w sebaceous hyperplasia      Irregularly shaped tan macule c/w lentigo     1-2 mm smooth white papules consistent with Milia      Movable subcutaneous cyst with punctum c/w epidermal inclusion cyst      Subcutaneous movable cyst c/w pilar cyst      Firm pink to brown papule c/w dermatofibroma      Pedunculated fleshy papule(s) c/w skin tag(s)      Evenly pigmented macule c/w junctional nevus     Mildly variegated pigmented, slightly irregular-bordered macule c/w mildly atypical nevus      Flesh colored to evenly pigmented papule c/w intradermal nevus       Pink pearly papule/plaque c/w basal cell carcinoma      Erythematous hyperkeratotic cursted plaque c/w SCC      Surgical scar with no sign of skin cancer recurrence      Open and closed comedones      Inflammatory papules and pustules      Verrucoid papule consistent consistent with wart     Erythematous eczematous patches and plaques     Dystrophic onycholytic nail with  subungual debris c/w onychomycosis     Umbilicated papule    Erythematous-base heme-crusted tan verrucoid plaque consistent with inflamed seborrheic keratosis     Erythematous Silvery Scaling Plaque c/w Psoriasis     See annotation          Assessment / Plan:      Pathology Orders:     Normal Orders This Visit    Tissue Specimen To Pathology, Dermatology     Questions:    Directional Terms:  Other(comment)    Clinical Information:  atyp freckle    Specific Site:  left top of ear        Neoplasm  -     Tissue Specimen To Pathology, Dermatology  Shave biopsy procedure note:    Shave biopsy performed after verbal consent including risk of infection, scar, recurrence, need for additional treatment of site. Area prepped with alcohol, anesthetized with approximately 1.0cc of 1% lidocaine with epinephrine. Lesional tissue shaved with razor blade. Hemostasis achieved with application of aluminum chloride followed by hyfrecation. No complications. Dressing applied. Wound care explained.      AK (actinic keratosis)  Discussed all of the following:  Actinic keratosis is a skin growth caused by sun damage. These growths are not cancer, but they may develop into skin cancer (precancerous). Many people get these growths, especially as they age. This is because of cumulative sun exposure over years. Multiple growths are called actinic keratoses.    Offered cryotherapy today, but patient wishes to wait on this for now.  Patient to monitor areas for worsening and to return to clinic promptly if growth or worsening of lesions is noted.  Pt has cousin's wedding this Saturday.  We will plan performing cryotherapy to the f head and scalp and hands during a follow up appointment in the future.    Lentigo  Watch l ear.    History of SCC (squamous cell carcinoma) of skin  Sp exc r hairline.  No signs of recurrence are noted in previous surgical areas or scars.             Follow up in about 6 weeks (around 6/6/2019) for Procedure.

## 2019-04-25 NOTE — PATIENT INSTRUCTIONS

## 2019-05-20 ENCOUNTER — OFFICE VISIT (OUTPATIENT)
Dept: DERMATOLOGY | Facility: CLINIC | Age: 79
End: 2019-05-20
Payer: MEDICARE

## 2019-05-20 VITALS — WEIGHT: 172 LBS | BODY MASS INDEX: 25.48 KG/M2 | HEIGHT: 69 IN

## 2019-05-20 DIAGNOSIS — H57.89 BURNING SENSATION OF EYE: ICD-10-CM

## 2019-05-20 DIAGNOSIS — L57.0 AK (ACTINIC KERATOSIS): Primary | ICD-10-CM

## 2019-05-20 DIAGNOSIS — Z85.828 HISTORY OF SCC (SQUAMOUS CELL CARCINOMA) OF SKIN: ICD-10-CM

## 2019-05-20 PROCEDURE — 1101F PR PT FALLS ASSESS DOC 0-1 FALLS W/OUT INJ PAST YR: ICD-10-PCS | Mod: CPTII,S$GLB,, | Performed by: DERMATOLOGY

## 2019-05-20 PROCEDURE — 17004 PR DESTRUCTION, PREMALIGNANT LESIONS; 15 OR MORE LESIONS: ICD-10-PCS | Mod: S$GLB,,, | Performed by: DERMATOLOGY

## 2019-05-20 PROCEDURE — 99999 PR PBB SHADOW E&M-EST. PATIENT-LVL III: ICD-10-PCS | Mod: PBBFAC,,, | Performed by: DERMATOLOGY

## 2019-05-20 PROCEDURE — 99213 OFFICE O/P EST LOW 20 MIN: CPT | Mod: 25,S$GLB,, | Performed by: DERMATOLOGY

## 2019-05-20 PROCEDURE — 99213 PR OFFICE/OUTPT VISIT, EST, LEVL III, 20-29 MIN: ICD-10-PCS | Mod: 25,S$GLB,, | Performed by: DERMATOLOGY

## 2019-05-20 PROCEDURE — 17004 DESTROY PREMAL LESIONS 15/>: CPT | Mod: S$GLB,,, | Performed by: DERMATOLOGY

## 2019-05-20 PROCEDURE — 1101F PT FALLS ASSESS-DOCD LE1/YR: CPT | Mod: CPTII,S$GLB,, | Performed by: DERMATOLOGY

## 2019-05-20 PROCEDURE — 99999 PR PBB SHADOW E&M-EST. PATIENT-LVL III: CPT | Mod: PBBFAC,,, | Performed by: DERMATOLOGY

## 2019-05-20 NOTE — PATIENT INSTRUCTIONS

## 2019-05-20 NOTE — PROGRESS NOTES
Subjective:       Patient ID:  Song Lopez Jr. is a 79 y.o. male who presents for   Chief Complaint   Patient presents with    Actinic Keratosis     forehead, scalp and arms     HPI     Last o/v 4/25/2019  Neoplasm  -     Tissue Specimen To Pathology, Dermatology  Shave biopsy procedure note:  Skin, left top of the ear, shave biopsy:  - ACTINIC KERATOSIS, PIGMENTED.     AK (actinic keratosis)  Discussed all of the following:  Actinic keratosis is a skin growth caused by sun damage. These growths are not cancer, but they may develop into skin cancer (precancerous). Many people get these growths, especially as they age. This is because of cumulative sun exposure over years. Multiple growths are called actinic keratoses.     Offered cryotherapy today, but patient wishes to wait on this for now.  Patient to monitor areas for worsening and to return to clinic promptly if growth or worsening of lesions is noted.  Pt has cousin's wedding this Saturday.  We will plan performing cryotherapy to the f head and scalp and hands during a follow up appointment in the future.     Lentigo  Watch l ear.     History of SCC (squamous cell carcinoma) of skin  Sp exc r hairline.  No signs of recurrence are noted in previous surgical areas or scars.    Here today for AK follow up to the scalp, forehead and arms. Was appyling Amlactin cream a few times, dc due to eye burning. Spots still scaly today. Here for cryo tx.    Has glaucoma of both eyes.  Had increased pressure of the right with amlactin?    Review of Systems   Constitutional: Negative for fever, chills and fatigue.   HENT: Negative for mouth sores.    Eyes: Negative for itching, eye watering and eye irritation.   Respiratory: Negative for cough.    Genitourinary: Negative for genital sores and genital itching.   Musculoskeletal: Negative for joint swelling and arthralgias.   Skin: Positive for dry skin and wears hat. Negative for daily sunscreen use and activity-related  sunscreen use.   Hematologic/Lymphatic: Bruises/bleeds easily.        Objective:    Physical Exam   Constitutional: He appears well-developed and well-nourished. No distress.   Eyes: No conjunctival no injection.   Neurological: He is alert and oriented to person, place, and time. He is not disoriented.   Psychiatric: He has a normal mood and affect.   Skin:   Areas Examined (abnormalities noted in diagram):   Scalp / Hair Palpated and Inspected  Head / Face Inspection Performed  Neck Inspection Performed  RUE Inspected  LUE Inspection Performed  Nails and Digits Inspection Performed                   Diagram Legend     Erythematous scaling macule/papule c/w actinic keratosis       Vascular papule c/w angioma      Pigmented verrucoid papule/plaque c/w seborrheic keratosis      Yellow umbilicated papule c/w sebaceous hyperplasia      Irregularly shaped tan macule c/w lentigo     1-2 mm smooth white papules consistent with Milia      Movable subcutaneous cyst with punctum c/w epidermal inclusion cyst      Subcutaneous movable cyst c/w pilar cyst      Firm pink to brown papule c/w dermatofibroma      Pedunculated fleshy papule(s) c/w skin tag(s)      Evenly pigmented macule c/w junctional nevus     Mildly variegated pigmented, slightly irregular-bordered macule c/w mildly atypical nevus      Flesh colored to evenly pigmented papule c/w intradermal nevus       Pink pearly papule/plaque c/w basal cell carcinoma      Erythematous hyperkeratotic cursted plaque c/w SCC      Surgical scar with no sign of skin cancer recurrence      Open and closed comedones      Inflammatory papules and pustules      Verrucoid papule consistent consistent with wart     Erythematous eczematous patches and plaques     Dystrophic onycholytic nail with subungual debris c/w onychomycosis     Umbilicated papule    Erythematous-base heme-crusted tan verrucoid plaque consistent with inflamed seborrheic keratosis     Erythematous Silvery Scaling  Plaque c/w Psoriasis     See annotation      Assessment / Plan:        AK (actinic keratosis)  Discussed all of the following:  Actinic keratosis is a skin growth caused by sun damage. These growths are not cancer, but they may develop into skin cancer (precancerous). Many people get these growths, especially as they age. This is because of cumulative sun exposure over years. Multiple growths are called actinic keratoses.    Cryosurgery Procedure Note    Verbal consent from the patient is obtained including, but not limited to, risk of hypopigmentation/hyperpigmentation, scar, recurrence of lesion. The patient is aware of the precancerous quality and need for treatment of these lesions. Liquid nitrogen cryosurgery is applied to the 17 actinic keratoses, as detailed in the physical exam, to produce a freeze injury. The patient is aware that blisters may form and is instructed on wound care with gentle cleansing and use of vaseline ointment to keep moist until healed. The patient is supplied a handout on cryosurgery and is instructed to call if lesions do not completely resolve.    History of SCC (squamous cell carcinoma) of skin  No signs of recurrence are noted in previous surgical areas or scars.    Burning sensation of eye  Dc'd possibility of amlactin causing irritation via vapor?  Pt to stop amlactin usage for f head and scalp.  Only use amlactin to arms.  Pt states he washed hands after using amlactin and did not touch eyes.  Pt sees optho?  Had eyes checked before.  Patient to watch for recurrence or flares and to call the clinic for a follow up appointment for such.             Follow up in about 3 months (around 8/20/2019).

## 2019-08-06 ENCOUNTER — OFFICE VISIT (OUTPATIENT)
Dept: HEMATOLOGY/ONCOLOGY | Facility: CLINIC | Age: 79
End: 2019-08-06
Payer: MEDICARE

## 2019-08-06 VITALS
HEART RATE: 65 BPM | SYSTOLIC BLOOD PRESSURE: 137 MMHG | WEIGHT: 169 LBS | TEMPERATURE: 98 F | BODY MASS INDEX: 24.96 KG/M2 | DIASTOLIC BLOOD PRESSURE: 60 MMHG | RESPIRATION RATE: 18 BRPM

## 2019-08-06 DIAGNOSIS — G47.30 SLEEP APNEA IN ADULT: ICD-10-CM

## 2019-08-06 DIAGNOSIS — M16.11 OSTEOARTHRITIS OF RIGHT HIP, UNSPECIFIED OSTEOARTHRITIS TYPE: ICD-10-CM

## 2019-08-06 DIAGNOSIS — D75.1 POLYCYTHEMIA, SECONDARY: Primary | ICD-10-CM

## 2019-08-06 PROCEDURE — 99214 PR OFFICE/OUTPT VISIT, EST, LEVL IV, 30-39 MIN: ICD-10-PCS | Mod: S$GLB,,, | Performed by: INTERNAL MEDICINE

## 2019-08-06 PROCEDURE — 99214 OFFICE O/P EST MOD 30 MIN: CPT | Mod: S$GLB,,, | Performed by: INTERNAL MEDICINE

## 2019-08-06 PROCEDURE — 1101F PT FALLS ASSESS-DOCD LE1/YR: CPT | Mod: S$GLB,,, | Performed by: INTERNAL MEDICINE

## 2019-08-06 PROCEDURE — 1101F PR PT FALLS ASSESS DOC 0-1 FALLS W/OUT INJ PAST YR: ICD-10-PCS | Mod: S$GLB,,, | Performed by: INTERNAL MEDICINE

## 2019-08-06 RX ORDER — OMEPRAZOLE 20 MG/1
CAPSULE, DELAYED RELEASE ORAL
Refills: 1 | COMMUNITY
Start: 2019-07-26 | End: 2020-01-29 | Stop reason: SDUPTHER

## 2019-08-10 ENCOUNTER — TELEPHONE (OUTPATIENT)
Dept: HEMATOLOGY/ONCOLOGY | Facility: CLINIC | Age: 79
End: 2019-08-10

## 2019-08-11 NOTE — PROGRESS NOTES
[facility] History & Physical    Subjective:      Patient ID:   Song Lopez Jr.  79 y.o. male  1940  [place]      Chief Complaint:   polycythemia    HPI:  79 y.o. male with diagnosis of polycythemia, on chronic phlebotomy schedule.  Orders updated and written for 12 months.  Hx of gout, HTN, thyroid dx, and pneumothorax.  No co, SOB no, GAMEZ no.  He has sleep apnea syndrome and uses a C Pap machine.    He has some pain symptoms at the right hip, x-ray was done per Dr. Kovacs, he was told that he had early arthritic change at the hip.    C/O balance is off, fell x's 1.  Tilt test negative is negative.    On phlebotomy q 2 months., orders written.  No hx of CVA.    ROS:   GEN: normal without any fever, night sweats or weight loss  HEENT: normal with no HA's, sore throat, stiff neck, changes in vision  CV: normal with no CP, SOB, PND, GAMEZ or orthopnea  PULM: See HPI.  no SOB, cough, hemoptysis, sputum or pleuritic pain  GI: normal with no abdominal pain, nausea, vomiting, constipation, diarrhea, melanotic stools, BRBPR  : normal with no hematuria, dysuria  BREAST: normal with no mass, discharge, pain  SKIN: normal with no rash, erythema, bruising, or swelling     Past Medical History:   Diagnosis Date    Chronic gout     Hypertension     Polycythemia     Squamous cell carcinoma     Thyroid disease      Past Surgical History:   Procedure Laterality Date    CAROTID ARTERY ANGIOPLASTY  right    carotid endarderectomy      CATARACT EXTRACTION, BILATERAL      CATARACT EXTRACTION, BILATERAL      left wrist surgery         Review of patient's allergies indicates:   Allergen Reactions    Penicillins Other (See Comments)     Skin turns red or appears sunburned           Current Outpatient Medications:     allopurinol (ZYLOPRIM) 100 MG tablet, Take 100 mg by mouth once daily., Disp: , Rfl:     aspirin (ECOTRIN) 81 MG EC tablet, Take 81 mg by mouth once daily., Disp: , Rfl:     brimonidine 0.15 % OPTH DROP  (ALPHAGAN) 0.15 % ophthalmic solution, , Disp: , Rfl:     diltiazem (TIAZAC) 360 MG CpSR, Take 360 mg by mouth once daily., Disp: , Rfl:     latanoprost 0.005 % ophthalmic solution, Place 1 drop into both eyes every evening., Disp: , Rfl:     levothyroxine (SYNTHROID) 88 MCG tablet, , Disp: , Rfl:     lovastatin (MEVACOR) 20 MG tablet, Take 20 mg by mouth every evening., Disp: , Rfl:     omeprazole (PRILOSEC) 20 MG capsule, TAKE 1 CAPSULE BY MOUTH ONCE DAILY FOR 90 DAYS, Disp: , Rfl: 1    pantoprazole (PROTONIX) 40 MG tablet, pantoprazole 40 mg tablet,delayed release  TAKE 1 TABLET BY MOUTH TWICE DAILY FOR 14 DAYS THEN TAKE ONE TABLET BY MOUTH DAILY, Disp: , Rfl:     tiZANidine (ZANAFLEX) 4 MG tablet, , Disp: , Rfl:           Objective:   Vitals:  Blood pressure 137/60, pulse 65, temperature 97.6 °F (36.4 °C), resp. rate 18, weight 76.7 kg (169 lb).    Physical Examination:   GEN: no apparent distress, comfortable  HEAD: atraumatic and normocephalic  EYES: no pallor, no icterus, no francisco j complexion  ENT: no pharyngeal erythema, no nasal discharge  NECK: no masses, thyroid normal,  no LAD/LN's, supple  CV: RRR with no murmur; normal pulse; normal S1 and S2; no pedal edema  CHEST: Normal respiratory effort; CTAB; distant breath sounds; no wheeze or crackles  ABDOM: nontender and nondistended; soft; no rebound/guarding, L/S NP  MUSC/Skeletal: ROM normal; no crepitus; joints normal  EXTREM: no clubbing, cyanosis, inflammation or swelling  SKIN: no rashes, lesions, ulcers, petechiae   : no cvat  NEURO: grossly intact; motor/sensory WNL; no tremors.  Tilt test negative.  PSYCH: normal mood, affect and behavior  LYMPH: normal cervical, supraclavicular, axillary and groin LN's    Lab per Dr. Kovacs's office in July showed hemoglobin 15.9, hematocrit 52.6, MCV 70, white blood cell count 8400, and platelet count of 395,000. LFTs were normal, TSH was normal, creatinine was normal and urinalysis was  unremarkable    Assessment:   (1) 79 y.o. male with diagnosis of polycythemia, on phlebotomy schedule.  Orders updated and written for phlebotomy for 1 year to maintain hematocrit at 48 or less.    (2)Sleep apnea hx., thyroid dx,  HTN.  He uses a C Pap machine.    (3)R hip sx, DJD, as per Dr. Kovacs.      (4)Imbalance sx, check MRI of head.  RTC 1-2 weeks.

## 2019-08-13 NOTE — TELEPHONE ENCOUNTER
Addended by: DINESH REDDY on: 1/5/2018 08:51 AM     Modules accepted: Orders     No order place for MRI of the Brain. Patient's RTC is 8/27 @ 1:15. Gave message to Pauline to have ordered placed.

## 2019-08-14 ENCOUNTER — CLINICAL SUPPORT (OUTPATIENT)
Dept: URGENT CARE | Facility: CLINIC | Age: 79
End: 2019-08-14
Payer: MEDICARE

## 2019-08-14 ENCOUNTER — HOSPITAL ENCOUNTER (EMERGENCY)
Facility: HOSPITAL | Age: 79
Discharge: HOME | End: 2019-08-15
Attending: EMERGENCY MEDICINE
Payer: MEDICARE

## 2019-08-14 VITALS
WEIGHT: 170 LBS | DIASTOLIC BLOOD PRESSURE: 98 MMHG | SYSTOLIC BLOOD PRESSURE: 215 MMHG | HEIGHT: 69 IN | BODY MASS INDEX: 25.18 KG/M2 | TEMPERATURE: 98 F | HEART RATE: 70 BPM | OXYGEN SATURATION: 100 % | RESPIRATION RATE: 18 BRPM

## 2019-08-14 VITALS
RESPIRATION RATE: 18 BRPM | SYSTOLIC BLOOD PRESSURE: 178 MMHG | BODY MASS INDEX: 25.33 KG/M2 | HEIGHT: 69 IN | WEIGHT: 171 LBS | TEMPERATURE: 96 F | DIASTOLIC BLOOD PRESSURE: 80 MMHG | OXYGEN SATURATION: 96 % | HEART RATE: 85 BPM

## 2019-08-14 DIAGNOSIS — S61.012A LACERATION OF LEFT THUMB WITHOUT FOREIGN BODY WITHOUT DAMAGE TO NAIL, INITIAL ENCOUNTER: Primary | ICD-10-CM

## 2019-08-14 DIAGNOSIS — S65.412D LACERATION OF BLOOD VESSEL OF LEFT THUMB, SUBSEQUENT ENCOUNTER: ICD-10-CM

## 2019-08-14 DIAGNOSIS — S61.012D LACERATION OF LEFT THUMB WITHOUT FOREIGN BODY WITHOUT DAMAGE TO NAIL, SUBSEQUENT ENCOUNTER: Primary | ICD-10-CM

## 2019-08-14 PROCEDURE — 99204 OFFICE O/P NEW MOD 45 MIN: CPT | Mod: 25,S$GLB,, | Performed by: NURSE PRACTITIONER

## 2019-08-14 PROCEDURE — 12001 RPR S/N/AX/GEN/TRNK 2.5CM/<: CPT | Mod: S$GLB,,, | Performed by: NURSE PRACTITIONER

## 2019-08-14 PROCEDURE — 12001 RPR S/N/AX/GEN/TRNK 2.5CM/<: CPT | Mod: FA

## 2019-08-14 PROCEDURE — 99204 PR OFFICE/OUTPT VISIT, NEW, LEVL IV, 45-59 MIN: ICD-10-PCS | Mod: 25,S$GLB,, | Performed by: NURSE PRACTITIONER

## 2019-08-14 PROCEDURE — 12001 LACERATION REPAIR: ICD-10-PCS | Mod: S$GLB,,, | Performed by: NURSE PRACTITIONER

## 2019-08-14 PROCEDURE — 99283 EMERGENCY DEPT VISIT LOW MDM: CPT | Mod: 25

## 2019-08-14 NOTE — PATIENT INSTRUCTIONS
General Laceration (Child)  A laceration is a cut through the skin. If it is deep, it may require stitches (sutures) or staples to close the wound so it can heal. Minor cuts may be closed with surgical tape or skin glue.   X-rays may be done if something may have entered the skin through the cut. Your child may also need a tetanus shot if he or she is not up to date on this vaccination.  Home care  · The healthcare provider may prescribe an antibiotic cream or ointment to prevent infection. Do not stop using this medication until the prescribed course is finished or the healthcare provider tells you to stop.  · The healthcare provider may prescribe medicines for pain. Follow instructions for giving them to your child.  · Keep the wound clean and dry. Do not let the wound get wet until you are told it is okay to do so.  · If a bandage was applied, leave it in place for the first 24 hours. Then change it every 24 hours or as directed. If it becomes wet or dirty, replace it sooner. Gently pat the wound dry with a clean cloth. Then put on a clean, dry bandage.  · Caring for sutures or staples: Once you no longer need to keep them dry, clean the wound daily. First, remove the bandage. Then wash the area gently with soap and warm water, or as directed by the healthcare provider. Use a wet cotton swab to loosen and remove any blood or crust that forms. After cleaning, apply a thin layer of antibiotic ointment if advised. Then put on a new bandage unless you are told not to.  · Caring for skin glue: Dont put apply liquid, ointment, or cream on the wound while the glue is in place. Have your child avoid activities that cause heavy sweating. Protect the wound from sunlight. Keep your child from scratching, rubbing, or picking at the adhesive film. Do not place tape directly over the film. The glue should peel off within 5 to 10 days.   · Caring for surgical tape: Keep the area dry. If it gets wet, blot it dry with a clean  towel. Surgical tape usually falls off within 7 to 10 days. If it has not fallen off after 10 days, you can take it off yourself. Put mineral oil or petroleum jelly on a cotton ball and gently rub the tape until it is removed.  · Once the wound can get wet, the child may shower. The wound should not be soaked in water (no tub baths or swimming)  · Even with proper treatment, a wound infection may sometimes occur. Check the wound daily for signs of infection listed below.  Follow-up care  Follow up with your child's healthcare provider as advised. Ask the healthcare provider how long sutures should be left in place. Be sure to bring the child back for suture removal as directed. If dissolving stitches were used in the mouth, these should fall out or dissolve without the need for removal. If tape closures were used, you may remove them yourself when the healthcare provider recommends if they have not fallen off on their own. If skin glue was used, the film will wear off by itself.  Special note to parents  Healthcare providers are trained to see injuries such as this in young children as a sign of possible abuse. You may be asked questions about how your child was injured. Healthcare providers are required by law to ask you these questions. This is done to protect your child. Please try to be patient.  When to seek medical advice  Call the child's healthcare provider for any of the following:  · Wound bleeding not controlled by direct pressure  · Signs of infection: Increasing pain in the wound, increasing wound redness or swelling, or pus or bad odor coming from the wound  · Fever of 100.4°F (38.ºC) or higher or as directed by the child's healthcare provider  · Stitches or staples come apart or fall out or surgical tape falls off before 7 days  · Wound edges re-open  · Wound changes colors  · Numbness around the wound   · Decreased movement around the injured area  Date Last Reviewed: 6/14/2015  © 5649-1551 [employer]. [address] [place] [address], [place] [address]. All rights reserved. This information is not intended as a substitute for professional medical care. Always follow your healthcare professional's instructions.        Extremity Laceration: Sutures, Staples, or Tape  A laceration is a cut through the skin. If it is deep, it may require stitches (sutures) or staples to close so it can heal. Minor cuts may be treated with surgical tape closures.   X-rays may be done if something may have entered the skin through the cut. You may also need a tetanus shot if you are not up to date on this vaccination.  Home care  · Follow the health care providers instructions on how to care for the cut.  · Wash your hands with soap and warm water before and after caring for your wound. This is to help prevent infection.  · Keep the wound clean and dry. If a bandage was applied and it becomes wet or dirty, replace it. Otherwise, leave it in place for the first 24 hours, then change it once a day or as directed.  · If sutures or staples were used, clean the wound daily:  · After removing the bandage, wash the area with soap and water. Use a wet cotton swab to loosen and remove any blood or crust that forms.  · After cleaning, keep the wound clean and dry. Talk with your doctor before applying any antibiotic ointment to the wound. Reapply the bandage.  · You may remove the bandage to shower as usual after the first 24 hours, but do not soak the area in water (no swimming) until the stitches or staples are removed.  · If surgical tape closures were used, keep the area clean and dry. If it becomes wet, blot it dry with a towel.  · The doctor may prescribe an antibiotic cream or ointment to prevent infection. Do not stop taking this medication until you have finished the prescribed course or the doctor tells you to stop. The doctor may also prescribe medications for pain. Follow the doctors instructions for taking these  medications.  · Avoid activities that may reopen your wound.  Follow-up care  Follow up with your health care provider. Most skin wounds heal within ten days. However, an infection may sometimes occur despite proper treatment. Therefore, check the wound daily for the signs of infection listed below. Stitches and staples should be removed within 7-14 days. If surgical tape closures were used, you may remove them after 10 days if they have not fallen off by then.   When to seek medical advice  Call your health care provider right away if any of these occur:  · Wound bleeding not controlled by direct pressure  · Signs of infection, including increasing pain in the wound, increasing wound redness or swelling, or pus or bad odor coming from the wound  · Fever of 100.4°F (38ºC) or higher or as directed by your healthcare provider  · Stitches or staples come apart or fall out or surgical tape falls off before 7 days  · Wound edges re-open  · Wound changes colors  · Numbness around the wound   · Decreased movement around the injured area  Date Last Reviewed: 6/14/2015  © 0087-3608 [employer]. [address] [place] [address]. All rights reserved. This information is not intended as a substitute for professional medical care. Always follow your healthcare professional's instructions.        Laceration: All Closures  A laceration is a cut through the skin. This will usually require stitches (sutures) or staples if it is deep. Minor cuts may be treated with a surgical tape closure or skin glue.    Home care  · Your healthcare provider may prescribe an antibiotic. This is to help prevent infection. Follow all instructions for taking this medicine. Take the medicine every day until it is gone or you are told to stop. You should not have any left over.  · The healthcare provider may prescribe medicines for pain. Follow instructions for taking them.  · Follow the healthcare providers instructions on how to  care for the cut.  · Keep the wound clean and dry. Do not get the wound wet until you are told it is okay to do so. If the area gets wet, gently pat it dry with a clean cloth. Replace the wet bandage with a dry one.  · If a bandage was applied and it becomes wet or dirty, replace it. Otherwise, leave it in place for the first 24 hours.  · Caring for sutures or staples: Once you no longer need to keep them dry, clean the wound daily. First, remove the bandage. Then wash the area gently with soap and warm water, or as directed by the health care provider. Use a wet cotton swab to loosen and remove any blood or crust that forms. After cleaning, apply a thin layer of antibiotic ointment if advised. Then put on a new bandage unless you are told not to.  · Caring for skin glue: Dont put apply liquid, ointment, or cream on the wound while the glue is in place. Avoid activities that cause heavy sweating. Protect the wound from sunlight. Do not scratch, rub, or pick at the adhesive film. Do not place tape directly over the film. The glue should peel off within 5 to 10 days.   · Caring for surgical tape: Keep the area dry. If it gets wet, blot it dry with a clean towel. Surgical tape usually falls off within 7 to 10 days. If it has not fallen off after 10 days, you can take it off yourself. Put mineral oil or petroleum jelly on a cotton ball and gently rub the tape until it is removed.  · Once you can get the wound wet, you may shower as usual but do not soak the wound in water (no tub baths or swimming)  · Even with proper treatment, a wound infection may sometimes occur. Check the wound daily for signs of infection listed below.  Scalp wounds  During the first two days, you may carefully rinse your hair in the shower to remove blood, glass or dirt particles. After two days, you may shower and shampoo your hair normally. Do not soak your scalp in the tub or go swimming until the stitches or staples have been removed. Talk  with your healthcare provider before applying any antibiotic ointment to the wound.  Mouth wounds  Eat soft foods to reduce pain. If the cut is inside of your mouth, clean by rinsing after each meal and at bedtime with a mixture of equal parts water and hydrogen peroxide (do not swallow!). Or, you can use a cotton swab to directly apply hydrogen peroxide onto the cut. Mouth wounds can be painful when eating. You may use an over-the-counter local numbing solution for pain relief. If this is not available, you may use any numbing solution intended for teething babies. You may apply this directly to the sores with a cotton-tip swab or with your finger.  Follow-up care  Follow up with your healthcare provider as advised. Ask your healthcare provider how long sutures should be left in place. Be sure to return for suture removal as directed. If dissolving stitches were used in the mouth, these should fall out or dissolve without the need for removal. If tape closures were used, remove them yourself when your provider recommends if they have not fallen off on their own. If skin glue was used, the film will wear off by itself.  When to seek medical advice  Call your healthcare provider right away if any of these occur:  · Wound bleeding not controlled by direct pressure  · Signs of infection, including increasing pain in the wound, increasing wound redness or swelling, or pus or bad odor coming from the wound  · Fever of 100.4°F (38.ºC) or higher or as directed by your healthcare provider  · Stitches or staples come apart or fall out or surgical tape falls off before 7 days  · Wound edges re-open  · Wound changes colors  · Numbness around the wound   · Decreased movement around the injured area  Date Last Reviewed: 6/14/2015  © 2497-0451 [employer]. [address] [place] [address], [place] [address]. All rights reserved. This information is not intended as a substitute for professional medical care. Always follow your  healthcare professional's instructions.

## 2019-08-14 NOTE — PROGRESS NOTES
"Subjective:       Patient ID: Song Lopez Jr. is a 79 y.o. male.    Vitals:  height is 5' 9" (1.753 m) and weight is 77.6 kg (171 lb). His temperature is 96.4 °F (35.8 °C). His blood pressure is 178/80 (abnormal) and his pulse is 85. His respiration is 18 and oxygen saturation is 96%.     Chief Complaint: Laceration    Has been bleeding for 2 hours. And has had constant pressure on it     Laceration    The incident occurred 1 to 3 hours ago. The laceration is located on the left hand. Injury mechanism: apple slicer. Foreign body present: unknown  His tetanus status is UTD.       Constitution: Negative for chills, fatigue and fever.   HENT: Negative for congestion and sore throat.    Neck: Negative for painful lymph nodes.   Cardiovascular: Negative for chest pain and leg swelling.   Eyes: Negative for double vision and blurred vision.   Respiratory: Negative for cough and shortness of breath.    Gastrointestinal: Negative for nausea, vomiting and diarrhea.   Genitourinary: Negative for dysuria, frequency and urgency.   Musculoskeletal: Positive for pain and trauma. Negative for joint pain, joint swelling, muscle cramps and muscle ache.   Skin: Positive for laceration. Negative for color change, pale, rash and erythema.   Allergic/Immunologic: Negative for seasonal allergies.   Neurological: Negative for dizziness, history of vertigo, light-headedness, passing out and headaches.   Hematologic/Lymphatic: Negative for swollen lymph nodes, easy bruising/bleeding and history of blood clots. Does not bruise/bleed easily.   Psychiatric/Behavioral: Negative for nervous/anxious, sleep disturbance and depression. The patient is not nervous/anxious.        Objective:      Physical Exam   Constitutional: He is oriented to person, place, and time. He appears well-developed and well-nourished.   HENT:   Head: Normocephalic and atraumatic. Head is without abrasion, without contusion and without laceration.   Right Ear: External " ear normal.   Left Ear: External ear normal.   Nose: Nose normal.   Mouth/Throat: Oropharynx is clear and moist.   Eyes: Pupils are equal, round, and reactive to light. Conjunctivae, EOM and lids are normal.   Neck: Trachea normal, full passive range of motion without pain and phonation normal. Neck supple.   Cardiovascular: Normal rate, regular rhythm and normal heart sounds.   Pulmonary/Chest: Effort normal and breath sounds normal. No stridor. No respiratory distress.   Musculoskeletal: Normal range of motion.   Neurological: He is alert and oriented to person, place, and time.   Skin: Skin is warm and dry. Capillary refill takes less than 2 seconds. Laceration noted. No abrasion, no bruising, no burn, no ecchymosis, no lesion and no rash noted. No erythema.   2cm linear laceration to left anterior thumb tip, oozing blood   Psychiatric: He has a normal mood and affect. His speech is normal and behavior is normal. Judgment and thought content normal. Cognition and memory are normal.   Nursing note and vitals reviewed.      Assessment:       1. Laceration of left thumb without foreign body without damage to nail, initial encounter        Plan:         Laceration of left thumb without foreign body without damage to nail, initial encounter  -     Laceration Repair

## 2019-08-14 NOTE — PROCEDURES
Laceration Repair  Date/Time: 8/14/2019 5:11 PM  Performed by: PRIYANKA Pruitt  Authorized by: PRIYANKA Pruitt   Body area: upper extremity  Location details: left hand  Laceration length: 2 cm  Foreign bodies: no foreign bodies  Tendon involvement: none  Nerve involvement: none  Vascular damage: no  Anesthesia: local infiltration    Anesthesia:  Local Anesthetic: lidocaine 1% without epinephrine  Anesthetic total: 2 mL  Preparation: Patient was prepped and draped in the usual sterile fashion.  Irrigation solution: saline  Irrigation method: syringe  Amount of cleaning: standard  Debridement: none  Degree of undermining: none  Skin closure: 4-0 nylon  Number of sutures: 3  Technique: simple  Approximation: close  Approximation difficulty: simple  Dressing: non-stick sterile dressing and dressing applied  Patient tolerance: Patient tolerated the procedure well with no immediate complications

## 2019-08-15 PROCEDURE — 25000003 PHARM REV CODE 250: Performed by: NURSE PRACTITIONER

## 2019-08-15 RX ORDER — DOXYCYCLINE 100 MG/1
100 CAPSULE ORAL 2 TIMES DAILY
Qty: 20 CAPSULE | Refills: 0 | Status: SHIPPED | OUTPATIENT
Start: 2019-08-15 | End: 2019-08-25

## 2019-08-15 RX ORDER — LIDOCAINE HYDROCHLORIDE 10 MG/ML
10 INJECTION, SOLUTION EPIDURAL; INFILTRATION; INTRACAUDAL; PERINEURAL
Status: COMPLETED | OUTPATIENT
Start: 2019-08-15 | End: 2019-08-15

## 2019-08-15 RX ADMIN — LIDOCAINE HYDROCHLORIDE 100 MG: 10 INJECTION, SOLUTION EPIDURAL; INFILTRATION; INTRACAUDAL; PERINEURAL at 12:08

## 2019-08-15 NOTE — ED PROVIDER NOTES
Encounter Date: 8/14/2019       History     Chief Complaint   Patient presents with    Laceration     79 year old male presents for evaluation of left thumb bleeding. He sustained laceration to palmar aspect of left thumb earlier today while coring an apple. He was see at [facility] where sutures were placed. He reports continued bleeding. He denies any new trauma or injury. He does not take anticoagulants.        Review of patient's allergies indicates:   Allergen Reactions    Penicillins Other (See Comments)     Skin turns red or appears sunburned     Past Medical History:   Diagnosis Date    Chronic gout     Hypertension     Polycythemia     Squamous cell carcinoma     Thyroid disease      Past Surgical History:   Procedure Laterality Date    CAROTID ARTERY ANGIOPLASTY  right    carotid endarderectomy      CATARACT EXTRACTION, BILATERAL      CATARACT EXTRACTION, BILATERAL      left wrist surgery       History reviewed. No pertinent family history.  Social History     Tobacco Use    Smoking status: Former Smoker     Packs/day: 1.00     Years: 25.00     Pack years: 25.00     Types: Cigarettes    Smokeless tobacco: Never Used   Substance Use Topics    Alcohol use: Yes     Comment: 6 per day    Drug use: No     Review of Systems   Constitutional: Negative.    HENT: Negative.    Respiratory: Negative.    Cardiovascular: Negative.    Gastrointestinal: Negative.    Musculoskeletal: Negative.    Skin:        laceration       Physical Exam     Initial Vitals [08/14/19 2330]   BP Pulse Resp Temp SpO2   (!) 215/98 70 18 98.1 °F (36.7 °C) 100 %      MAP       --         Physical Exam    Nursing note and vitals reviewed.  Constitutional: He appears well-developed and well-nourished. He is not diaphoretic. No distress.   HENT:   Head: Normocephalic.   Eyes: Pupils are equal, round, and reactive to light.   Cardiovascular: Normal rate, regular rhythm, normal heart sounds and intact distal pulses. Exam  reveals no gallop and no friction rub.    No murmur heard.  Pulmonary/Chest: Breath sounds normal. No respiratory distress. He has no rhonchi. He has no rales.   Abdominal: Soft. There is no tenderness.   Musculoskeletal:        Left hand: He exhibits laceration. He exhibits normal range of motion, no tenderness, no bony tenderness and normal two-point discrimination. Normal sensation noted. Normal strength noted.        Hands:  Neurological: He is alert and oriented to person, place, and time. He has normal strength.   Skin: Skin is warm and dry. Capillary refill takes less than 2 seconds.   Psychiatric: He has a normal mood and affect.         ED Course   Lac Repair  Date/Time: 8/15/2019 12:25 AM  Performed by: Lloyd Rossi NP  Authorized by: Lloyd Rossi NP   Consent Done: Not Needed  Body area: upper extremity  Location details: left thumb  Laceration length: 2 cm  Foreign bodies: no foreign bodies  Tendon involvement: none  Nerve involvement: none  Vascular damage: no  Anesthesia: local infiltration    Anesthesia:  Local Anesthetic: lidocaine 1% without epinephrine  Anesthetic total: 3 mL  Patient sedated: no  Preparation: Patient was prepped and draped in the usual sterile fashion.  Irrigation solution: betadine and NS.  Skin closure: 4-0 nylon  Number of sutures: 4  Technique: simple  Approximation: close  Approximation difficulty: simple  Dressing: 4x4 sterile gauze  Patient tolerance: Patient tolerated the procedure well with no immediate complications  Comments: 3 intact sutures in place from Urgent Care. 4 additional sutures placed for hemostasis        Labs Reviewed - No data to display       Imaging Results    None                               Clinical Impression:   Left Thumb Laceration            I agree with the assessment, treatment plan, and disposition of the patient as recorded by the nurse practitioner/physician assistant.                  Lloyd Rossi NP  08/15/19 0124        Leonor Vaughn MD  08/29/19 0557

## 2019-08-16 ENCOUNTER — TELEPHONE (OUTPATIENT)
Dept: HEMATOLOGY/ONCOLOGY | Facility: CLINIC | Age: 79
End: 2019-08-16

## 2019-08-16 NOTE — TELEPHONE ENCOUNTER
----- Message from Ewa Lee sent at 8/14/2019  2:25 PM CDT -----  Does Dr. Clark need to look at the previous ct scans patient had before he comes for his next appt. Pt states there was a CD sent from [facility] which contained his most recent CT...not sure where it went, but was supposed to be for Dr. Clark.     # [phone]

## 2019-08-20 ENCOUNTER — OFFICE VISIT (OUTPATIENT)
Dept: DERMATOLOGY | Facility: CLINIC | Age: 79
End: 2019-08-20
Payer: MEDICARE

## 2019-08-20 VITALS — WEIGHT: 170 LBS | BODY MASS INDEX: 25.18 KG/M2 | HEIGHT: 69 IN

## 2019-08-20 DIAGNOSIS — L57.0 AK (ACTINIC KERATOSIS): Primary | ICD-10-CM

## 2019-08-20 DIAGNOSIS — D18.00 ANGIOMA: ICD-10-CM

## 2019-08-20 DIAGNOSIS — H57.89 BURNING SENSATION OF EYE: ICD-10-CM

## 2019-08-20 DIAGNOSIS — Z85.828 HISTORY OF SCC (SQUAMOUS CELL CARCINOMA) OF SKIN: ICD-10-CM

## 2019-08-20 PROCEDURE — 99999 PR PBB SHADOW E&M-EST. PATIENT-LVL III: CPT | Mod: PBBFAC,,, | Performed by: DERMATOLOGY

## 2019-08-20 PROCEDURE — 1101F PR PT FALLS ASSESS DOC 0-1 FALLS W/OUT INJ PAST YR: ICD-10-PCS | Mod: CPTII,S$GLB,, | Performed by: DERMATOLOGY

## 2019-08-20 PROCEDURE — 99213 PR OFFICE/OUTPT VISIT, EST, LEVL III, 20-29 MIN: ICD-10-PCS | Mod: S$GLB,,, | Performed by: DERMATOLOGY

## 2019-08-20 PROCEDURE — 99213 OFFICE O/P EST LOW 20 MIN: CPT | Mod: S$GLB,,, | Performed by: DERMATOLOGY

## 2019-08-20 PROCEDURE — 99999 PR PBB SHADOW E&M-EST. PATIENT-LVL III: ICD-10-PCS | Mod: PBBFAC,,, | Performed by: DERMATOLOGY

## 2019-08-20 PROCEDURE — 1101F PT FALLS ASSESS-DOCD LE1/YR: CPT | Mod: CPTII,S$GLB,, | Performed by: DERMATOLOGY

## 2019-08-20 NOTE — PATIENT INSTRUCTIONS
Amlactin lotion or cream--Use on top of head at night. Apply at least 30 minutes before bedtime. Need only a very thin layer nightly. Start every other night for one week and then go to nightly if tolerated.    Make sure to wash hands immediately after applying.

## 2019-08-20 NOTE — PROGRESS NOTES
Per last OV 5/2019  AK (actinic keratosis)  Discussed all of the following:  Actinic keratosis is a skin growth caused by sun damage. These growths are not cancer, but they may develop into skin cancer (precancerous). Many people get these growths, especially as they age. This is because of cumulative sun exposure over years. Multiple growths are called actinic keratoses.     Cryosurgery Procedure Note     Verbal consent from the patient is obtained including, but not limited to, risk of hypopigmentation/hyperpigmentation, scar, recurrence of lesion. The patient is aware of the precancerous quality and need for treatment of these lesions. Liquid nitrogen cryosurgery is applied to the 17 actinic keratoses, as detailed in the physical exam, to produce a freeze injury. The patient is aware that blisters may form and is instructed on wound care with gentle cleansing and use of vaseline ointment to keep moist until healed. The patient is supplied a handout on cryosurgery and is instructed to call if lesions do not completely resolve.     History of SCC (squamous cell carcinoma) of skin  No signs of recurrence are noted in previous surgical areas or scars.     Burning sensation of eye  Dc'd possibility of amlactin causing irritation via vapor?  Pt to stop amlactin usage for f head and scalp.  Only use amlactin to arms.  Pt states he washed hands after using amlactin and did not touch eyes.  Pt sees optho?  Had eyes checked before.  Patient to watch for recurrence or flares and to call the clinic for a follow up appointment for such.  Subjective:       Patient ID:  Song Lopez Jr. is a 79 y.o. male who presents for   Chief Complaint   Patient presents with    Spot     recheck post cryo in May     Returns today for f/u post cryosurgery.    Also has spot on the palate noticed by dentist.      Review of Systems   Constitutional: Negative for fever, chills and fatigue.   HENT: Negative for mouth sores.    Eyes: Negative  for itching, eye watering and eye irritation.   Respiratory: Negative for cough.    Genitourinary: Negative for genital sores and genital itching.   Musculoskeletal: Negative for joint swelling and arthralgias.   Skin: Positive for dry skin and wears hat. Negative for daily sunscreen use and activity-related sunscreen use.   Hematologic/Lymphatic: Bruises/bleeds easily.        Objective:    Physical Exam   Constitutional: He appears well-developed and well-nourished. No distress.   HENT:   Head: Normocephalic.   Mouth/Throat: Uvula is midline. He has dentures.       Eyes: No conjunctival no injection.   Neurological: He is alert and oriented to person, place, and time. He is not disoriented.   Psychiatric: He has a normal mood and affect.   Skin:   Areas Examined (abnormalities noted in diagram):   Scalp / Hair Palpated and Inspected  Head / Face Inspection Performed  Neck Inspection Performed  RUE Inspected  LUE Inspection Performed  Nails and Digits Inspection Performed                   Diagram Legend     Erythematous scaling macule/papule c/w actinic keratosis       Vascular papule c/w angioma      Pigmented verrucoid papule/plaque c/w seborrheic keratosis      Yellow umbilicated papule c/w sebaceous hyperplasia      Irregularly shaped tan macule c/w lentigo     1-2 mm smooth white papules consistent with Milia      Movable subcutaneous cyst with punctum c/w epidermal inclusion cyst      Subcutaneous movable cyst c/w pilar cyst      Firm pink to brown papule c/w dermatofibroma      Pedunculated fleshy papule(s) c/w skin tag(s)      Evenly pigmented macule c/w junctional nevus     Mildly variegated pigmented, slightly irregular-bordered macule c/w mildly atypical nevus      Flesh colored to evenly pigmented papule c/w intradermal nevus       Pink pearly papule/plaque c/w basal cell carcinoma      Erythematous hyperkeratotic cursted plaque c/w SCC      Surgical scar with no sign of skin cancer recurrence      Open  and closed comedones      Inflammatory papules and pustules      Verrucoid papule consistent consistent with wart     Erythematous eczematous patches and plaques     Dystrophic onycholytic nail with subungual debris c/w onychomycosis     Umbilicated papule    Erythematous-base heme-crusted tan verrucoid plaque consistent with inflamed seborrheic keratosis     Erythematous Silvery Scaling Plaque c/w Psoriasis     See annotation      Assessment / Plan:        AK (actinic keratosis)  Discussed all of the following:  Actinic keratosis is a skin growth caused by sun damage. These growths are not cancer, but they may develop into skin cancer (precancerous). Many people get these growths, especially as they age. This is because of cumulative sun exposure over years. Multiple growths are called actinic keratoses.    Patient instructed in importance in daily sun protection. Sun avoidance and topical protection discussed.     Patient encouraged to wear hat for all outdoor exposure.     Also discussed sun protective clothing.    Offered cryotherapy today, but patient wishes to wait on this for now.  Patient to monitor areas for worsening and to return to clinic promptly if growth or worsening of lesions is noted.    Pt to retry amlactin carefully and wash hands after.  Can do at bed time or an hour before bed time to scalp only.  After acclimated, then can do f head.  Had only tried one time and had some amlactin get into the r eye with irritation and increased eye pressure with ho glaucoma.    Angioma  Discussed all of the following:  Actinic keratosis is a skin growth caused by sun damage. These growths are not cancer, but they may develop into skin cancer (precancerous). Many people get these growths, especially as they age. This is because of cumulative sun exposure over years. Multiple growths are called actinic keratoses.    We will recheck the upper palate at our follow up appointment.    History of SCC (squamous cell  carcinoma) of skin  No signs of recurrence are noted in previous surgical areas or scars.    Burning sensation of eye  One episode after applying amlactin to f head x 1 only.  Patient to watch for recurrence or flares and to call the clinic for a follow up appointment for such.             Follow up in about 4 months (around 12/20/2019).

## 2019-09-05 ENCOUNTER — TELEPHONE (OUTPATIENT)
Dept: HEMATOLOGY/ONCOLOGY | Facility: CLINIC | Age: 79
End: 2019-09-05

## 2019-09-05 DIAGNOSIS — D75.1 POLYCYTHEMIA, SECONDARY: ICD-10-CM

## 2019-09-05 DIAGNOSIS — R26.89 IMBALANCE: Primary | ICD-10-CM

## 2019-09-05 NOTE — TELEPHONE ENCOUNTER
----- Message from Kailyn Walton sent at 9/5/2019 10:06 AM CDT -----  Pt is saying that Dr Clark told him that he was going to put in an order for a CAT scan. He said he was told this at his last appt. Pt hasn't heard anything about it since. His last appt was canceled due to no MRI but pt doesn't know anything about an MRI. I don't see an order for anything in Epic. Please look into this and call the patient.    # [phone]

## 2019-09-09 ENCOUNTER — HOSPITAL ENCOUNTER (OUTPATIENT)
Dept: RADIOLOGY | Facility: HOSPITAL | Age: 79
Discharge: HOME | End: 2019-09-09
Attending: INTERNAL MEDICINE
Payer: MEDICARE

## 2019-09-09 DIAGNOSIS — D75.1 POLYCYTHEMIA, SECONDARY: ICD-10-CM

## 2019-09-09 DIAGNOSIS — R26.89 IMBALANCE: ICD-10-CM

## 2019-09-09 LAB
CREAT SERPL-MCNC: 1 MG/DL (ref 0.5–1.4)
SAMPLE: NORMAL

## 2019-09-09 PROCEDURE — A9585 GADOBUTROL INJECTION: HCPCS | Mod: PO | Performed by: INTERNAL MEDICINE

## 2019-09-09 PROCEDURE — 70553 MRI BRAIN STEM W/O & W/DYE: CPT | Mod: TC,PO

## 2019-09-09 PROCEDURE — 25500020 PHARM REV CODE 255: Mod: PO | Performed by: INTERNAL MEDICINE

## 2019-09-09 RX ORDER — GADOBUTROL 604.72 MG/ML
7.5 INJECTION INTRAVENOUS
Status: COMPLETED | OUTPATIENT
Start: 2019-09-09 | End: 2019-09-09

## 2019-09-09 RX ADMIN — GADOBUTROL 7.5 ML: 604.72 INJECTION INTRAVENOUS at 09:09

## 2019-09-12 ENCOUNTER — TELEPHONE (OUTPATIENT)
Dept: HEMATOLOGY/ONCOLOGY | Facility: CLINIC | Age: 79
End: 2019-09-12

## 2019-09-12 NOTE — TELEPHONE ENCOUNTER
Tell him MRI did not show Stroke, mass or cancer.  Continue on his ASA 81 mg daily.  If he wants, he can move his RTC to 8 /2020.

## 2019-09-13 ENCOUNTER — TELEPHONE (OUTPATIENT)
Dept: HEMATOLOGY/ONCOLOGY | Facility: CLINIC | Age: 79
End: 2019-09-13

## 2019-09-13 NOTE — TELEPHONE ENCOUNTER
Let pt know MRI did not show Stroke, mass or cancer.  Continue on his ASA 81 mg daily.  If he wants, he can move his RTC to 8 /2020.  Pt wants to keep appt in January.

## 2019-09-16 ENCOUNTER — TELEPHONE (OUTPATIENT)
Dept: HEMATOLOGY/ONCOLOGY | Facility: CLINIC | Age: 79
End: 2019-09-16

## 2019-09-16 NOTE — TELEPHONE ENCOUNTER
----- Message from Kailyn Walton sent at 9/16/2019  1:52 PM CDT -----  Pt needs blood center orders to get a pint taken depending on lab results. Pt wants it faxed there and copy for himself.    CB# [phone]

## 2019-09-24 ENCOUNTER — CLINICAL SUPPORT (OUTPATIENT)
Dept: FAMILY MEDICINE | Facility: CLINIC | Age: 79
End: 2019-09-24
Payer: MEDICARE

## 2019-09-24 DIAGNOSIS — Z23 NEED FOR INFLUENZA VACCINATION: Primary | ICD-10-CM

## 2019-09-24 DIAGNOSIS — E03.9 HYPOTHYROIDISM, UNSPECIFIED TYPE: ICD-10-CM

## 2019-09-24 PROCEDURE — G0008 ADMIN INFLUENZA VIRUS VAC: HCPCS | Mod: S$GLB,,, | Performed by: FAMILY MEDICINE

## 2019-09-24 PROCEDURE — 90662 IIV NO PRSV INCREASED AG IM: CPT | Mod: S$GLB,,, | Performed by: FAMILY MEDICINE

## 2019-09-24 PROCEDURE — G0008 FLU VACCINE - HIGH DOSE (65+) PRESERVATIVE FREE IM: ICD-10-PCS | Mod: S$GLB,,, | Performed by: FAMILY MEDICINE

## 2019-09-24 PROCEDURE — 90662 FLU VACCINE - HIGH DOSE (65+) PRESERVATIVE FREE IM: ICD-10-PCS | Mod: S$GLB,,, | Performed by: FAMILY MEDICINE

## 2019-09-24 RX ORDER — LEVOTHYROXINE SODIUM 88 UG/1
88 TABLET ORAL
Qty: 90 TABLET | Refills: 1 | Status: SHIPPED | OUTPATIENT
Start: 2019-09-24 | End: 2020-01-29 | Stop reason: SDUPTHER

## 2019-11-04 ENCOUNTER — TELEPHONE (OUTPATIENT)
Dept: FAMILY MEDICINE | Facility: CLINIC | Age: 79
End: 2019-11-04

## 2019-11-04 NOTE — TELEPHONE ENCOUNTER
----- Message from Ruba Gan sent at 11/4/2019  2:09 PM CST -----  Contact: Song Lopez  Can patient's lab orders be transferred from the old system to the new system please? He will be coming in to get lab work done before his upcoming appointment in January

## 2020-01-15 ENCOUNTER — TELEPHONE (OUTPATIENT)
Dept: FAMILY MEDICINE | Facility: CLINIC | Age: 80
End: 2020-01-15

## 2020-01-15 DIAGNOSIS — Z00.00 ROUTINE GENERAL MEDICAL EXAMINATION AT A HEALTH CARE FACILITY: Primary | ICD-10-CM

## 2020-01-15 DIAGNOSIS — E03.9 HYPOTHYROIDISM, UNSPECIFIED TYPE: ICD-10-CM

## 2020-01-15 DIAGNOSIS — Z79.899 ENCOUNTER FOR LONG-TERM (CURRENT) USE OF OTHER MEDICATIONS: ICD-10-CM

## 2020-01-17 LAB
ALBUMIN SERPL-MCNC: 4.7 G/DL (ref 3.6–5.1)
ALBUMIN/GLOB SERPL: 2.1 (CALC) (ref 1–2.5)
ALP SERPL-CCNC: 71 U/L (ref 40–115)
ALT SERPL-CCNC: 10 U/L (ref 9–46)
APPEARANCE UR: CLEAR
AST SERPL-CCNC: 16 U/L (ref 10–35)
BACTERIA #/AREA URNS HPF: NORMAL /HPF
BACTERIA UR CULT: NORMAL
BASOPHILS # BLD AUTO: 288 CELLS/UL (ref 0–200)
BASOPHILS NFR BLD AUTO: 3 %
BILIRUB SERPL-MCNC: 0.7 MG/DL (ref 0.2–1.2)
BILIRUB UR QL STRIP: NEGATIVE
BUN SERPL-MCNC: 9 MG/DL (ref 7–25)
BUN/CREAT SERPL: NORMAL (CALC) (ref 6–22)
CALCIUM SERPL-MCNC: 10.1 MG/DL (ref 8.6–10.3)
CHLORIDE SERPL-SCNC: 98 MMOL/L (ref 98–110)
CHOLEST SERPL-MCNC: 147 MG/DL
CHOLEST/HDLC SERPL: 3.1 (CALC)
CO2 SERPL-SCNC: 28 MMOL/L (ref 20–32)
COLOR UR: YELLOW
CREAT SERPL-MCNC: 1.04 MG/DL (ref 0.7–1.18)
EOSINOPHIL # BLD AUTO: 413 CELLS/UL (ref 15–500)
EOSINOPHIL NFR BLD AUTO: 4.3 %
ERYTHROCYTE [DISTWIDTH] IN BLOOD BY AUTOMATED COUNT: 18.2 % (ref 11–15)
GFRSERPLBLD MDRD-ARVRAT: 68 ML/MIN/1.73M2
GLOBULIN SER CALC-MCNC: 2.2 G/DL (CALC) (ref 1.9–3.7)
GLUCOSE SERPL-MCNC: 94 MG/DL (ref 65–99)
GLUCOSE UR QL STRIP: NEGATIVE
HCT VFR BLD AUTO: 44.2 % (ref 38.5–50)
HDLC SERPL-MCNC: 48 MG/DL
HGB BLD-MCNC: 12.8 G/DL (ref 13.2–17.1)
HGB UR QL STRIP: NEGATIVE
HYALINE CASTS #/AREA URNS LPF: NORMAL /LPF
KETONES UR QL STRIP: NEGATIVE
LDLC SERPL CALC-MCNC: 84 MG/DL (CALC)
LEUKOCYTE ESTERASE UR QL STRIP: NEGATIVE
LYMPHOCYTES # BLD AUTO: 1891 CELLS/UL (ref 850–3900)
LYMPHOCYTES NFR BLD AUTO: 19.7 %
MCH RBC QN AUTO: 18.9 PG (ref 27–33)
MCHC RBC AUTO-ENTMCNC: 29 G/DL (ref 32–36)
MCV RBC AUTO: 65.1 FL (ref 80–100)
MONOCYTES # BLD AUTO: 778 CELLS/UL (ref 200–950)
MONOCYTES NFR BLD AUTO: 8.1 %
NEUTROPHILS # BLD AUTO: 6230 CELLS/UL (ref 1500–7800)
NEUTROPHILS NFR BLD AUTO: 64.9 %
NITRITE UR QL STRIP: NEGATIVE
NONHDLC SERPL-MCNC: 99 MG/DL (CALC)
PH UR STRIP: 7.5 [PH] (ref 5–8)
PLATELET # BLD AUTO: 507 THOUSAND/UL (ref 140–400)
PMV BLD REES-ECKER: 10.1 FL (ref 7.5–12.5)
POTASSIUM SERPL-SCNC: 4.8 MMOL/L (ref 3.5–5.3)
PROT SERPL-MCNC: 6.9 G/DL (ref 6.1–8.1)
PROT UR QL STRIP: NEGATIVE
RBC # BLD AUTO: 6.79 MILLION/UL (ref 4.2–5.8)
RBC #/AREA URNS HPF: NORMAL /HPF
SERVICE CMNT-IMP: ABNORMAL
SODIUM SERPL-SCNC: 135 MMOL/L (ref 135–146)
SP GR UR STRIP: 1.01 (ref 1–1.03)
SQUAMOUS #/AREA URNS HPF: NORMAL /HPF
TRIGL SERPL-MCNC: 73 MG/DL
TSH SERPL-ACNC: 2.62 MIU/L (ref 0.4–4.5)
WBC # BLD AUTO: 9.6 THOUSAND/UL (ref 3.8–10.8)
WBC #/AREA URNS HPF: NORMAL /HPF

## 2020-01-29 ENCOUNTER — OFFICE VISIT (OUTPATIENT)
Dept: FAMILY MEDICINE | Facility: CLINIC | Age: 80
End: 2020-01-29
Payer: MEDICARE

## 2020-01-29 VITALS
SYSTOLIC BLOOD PRESSURE: 128 MMHG | HEART RATE: 64 BPM | WEIGHT: 165 LBS | HEIGHT: 67 IN | BODY MASS INDEX: 25.9 KG/M2 | DIASTOLIC BLOOD PRESSURE: 60 MMHG

## 2020-01-29 DIAGNOSIS — N40.0 ENLARGED PROSTATE WITHOUT LOWER URINARY TRACT SYMPTOMS (LUTS): ICD-10-CM

## 2020-01-29 DIAGNOSIS — G47.30 SLEEP APNEA IN ADULT: ICD-10-CM

## 2020-01-29 DIAGNOSIS — D75.1 POLYCYTHEMIA, SECONDARY: Primary | ICD-10-CM

## 2020-01-29 DIAGNOSIS — H40.1131 PRIMARY OPEN ANGLE GLAUCOMA OF BOTH EYES, MILD STAGE: ICD-10-CM

## 2020-01-29 DIAGNOSIS — D45 POLYCYTHEMIA VERA: ICD-10-CM

## 2020-01-29 DIAGNOSIS — K21.9 GASTRO-ESOPHAGEAL REFLUX DISEASE WITHOUT ESOPHAGITIS: ICD-10-CM

## 2020-01-29 DIAGNOSIS — Z23 NEED FOR VACCINATION: ICD-10-CM

## 2020-01-29 DIAGNOSIS — I65.23 BILATERAL CAROTID ARTERY STENOSIS: ICD-10-CM

## 2020-01-29 DIAGNOSIS — I10 ESSENTIAL HYPERTENSION: ICD-10-CM

## 2020-01-29 DIAGNOSIS — Z99.89 DEPENDENCE ON OTHER ENABLING MACHINES AND DEVICES: ICD-10-CM

## 2020-01-29 DIAGNOSIS — E03.9 HYPOTHYROIDISM, UNSPECIFIED TYPE: ICD-10-CM

## 2020-01-29 DIAGNOSIS — Z87.891 FORMER SMOKER: ICD-10-CM

## 2020-01-29 DIAGNOSIS — J84.10 PULMONARY FIBROSIS, UNSPECIFIED: ICD-10-CM

## 2020-01-29 DIAGNOSIS — M16.11 PRIMARY OSTEOARTHRITIS OF RIGHT HIP: ICD-10-CM

## 2020-01-29 DIAGNOSIS — M1A.0790 IDIOPATHIC CHRONIC GOUT OF FOOT WITHOUT TOPHUS, UNSPECIFIED LATERALITY: ICD-10-CM

## 2020-01-29 DIAGNOSIS — I65.23 ATHEROSCLEROSIS OF BOTH CAROTID ARTERIES: ICD-10-CM

## 2020-01-29 DIAGNOSIS — E78.2 MIXED HYPERLIPIDEMIA: ICD-10-CM

## 2020-01-29 PROBLEM — I65.29 STENOSIS OF CAROTID ARTERY: Status: ACTIVE | Noted: 2018-01-18

## 2020-01-29 PROBLEM — G47.33 OSA ON CPAP: Status: ACTIVE | Noted: 2017-08-07

## 2020-01-29 PROCEDURE — 1101F PR PT FALLS ASSESS DOC 0-1 FALLS W/OUT INJ PAST YR: ICD-10-PCS | Mod: S$GLB,,, | Performed by: FAMILY MEDICINE

## 2020-01-29 PROCEDURE — 90732 PNEUMOCOCCAL POLYSACCHARIDE VACCINE 23-VALENT =>2YO SQ IM: ICD-10-PCS | Mod: S$GLB,,, | Performed by: FAMILY MEDICINE

## 2020-01-29 PROCEDURE — 1101F PT FALLS ASSESS-DOCD LE1/YR: CPT | Mod: S$GLB,,, | Performed by: FAMILY MEDICINE

## 2020-01-29 PROCEDURE — 99214 OFFICE O/P EST MOD 30 MIN: CPT | Mod: 25,S$GLB,, | Performed by: FAMILY MEDICINE

## 2020-01-29 PROCEDURE — 99214 PR OFFICE/OUTPT VISIT, EST, LEVL IV, 30-39 MIN: ICD-10-PCS | Mod: 25,S$GLB,, | Performed by: FAMILY MEDICINE

## 2020-01-29 PROCEDURE — 3078F PR MOST RECENT DIASTOLIC BLOOD PRESSURE < 80 MM HG: ICD-10-PCS | Mod: S$GLB,,, | Performed by: FAMILY MEDICINE

## 2020-01-29 PROCEDURE — 3078F DIAST BP <80 MM HG: CPT | Mod: S$GLB,,, | Performed by: FAMILY MEDICINE

## 2020-01-29 PROCEDURE — 1159F PR MEDICATION LIST DOCUMENTED IN MEDICAL RECORD: ICD-10-PCS | Mod: S$GLB,,, | Performed by: FAMILY MEDICINE

## 2020-01-29 PROCEDURE — 1159F MED LIST DOCD IN RCRD: CPT | Mod: S$GLB,,, | Performed by: FAMILY MEDICINE

## 2020-01-29 PROCEDURE — G0009 PNEUMOCOCCAL POLYSACCHARIDE VACCINE 23-VALENT =>2YO SQ IM: ICD-10-PCS | Mod: S$GLB,,, | Performed by: FAMILY MEDICINE

## 2020-01-29 PROCEDURE — G0009 ADMIN PNEUMOCOCCAL VACCINE: HCPCS | Mod: S$GLB,,, | Performed by: FAMILY MEDICINE

## 2020-01-29 PROCEDURE — 3074F SYST BP LT 130 MM HG: CPT | Mod: S$GLB,,, | Performed by: FAMILY MEDICINE

## 2020-01-29 PROCEDURE — 90732 PPSV23 VACC 2 YRS+ SUBQ/IM: CPT | Mod: S$GLB,,, | Performed by: FAMILY MEDICINE

## 2020-01-29 PROCEDURE — 3074F PR MOST RECENT SYSTOLIC BLOOD PRESSURE < 130 MM HG: ICD-10-PCS | Mod: S$GLB,,, | Performed by: FAMILY MEDICINE

## 2020-01-29 RX ORDER — ALLOPURINOL 100 MG/1
100 TABLET ORAL DAILY
Qty: 90 TABLET | Refills: 1 | Status: SHIPPED | OUTPATIENT
Start: 2020-01-29 | End: 2020-09-10 | Stop reason: SDUPTHER

## 2020-01-29 RX ORDER — LOVASTATIN 20 MG/1
20 TABLET ORAL EVERY OTHER DAY
Qty: 45 TABLET | Refills: 1 | Status: SHIPPED | OUTPATIENT
Start: 2020-01-29 | End: 2020-07-20 | Stop reason: SDUPTHER

## 2020-01-29 RX ORDER — BRIMONIDINE TARTRATE 2 MG/ML
SOLUTION/ DROPS OPHTHALMIC
COMMUNITY
Start: 2020-01-27 | End: 2020-01-29

## 2020-01-29 RX ORDER — LEVOTHYROXINE SODIUM 88 UG/1
88 TABLET ORAL
Qty: 90 TABLET | Refills: 1 | Status: SHIPPED | OUTPATIENT
Start: 2020-01-29 | End: 2020-07-20 | Stop reason: SDUPTHER

## 2020-01-29 RX ORDER — DILTIAZEM HYDROCHLORIDE 360 MG/1
360 CAPSULE, EXTENDED RELEASE ORAL DAILY
Qty: 90 CAPSULE | Refills: 1 | Status: SHIPPED | OUTPATIENT
Start: 2020-01-29 | End: 2020-07-20 | Stop reason: SDUPTHER

## 2020-01-29 RX ORDER — OMEPRAZOLE 20 MG/1
20 CAPSULE, DELAYED RELEASE ORAL DAILY
Qty: 90 CAPSULE | Refills: 1 | Status: SHIPPED | OUTPATIENT
Start: 2020-01-29 | End: 2020-07-20 | Stop reason: SDUPTHER

## 2020-01-29 RX ORDER — UBIDECARENONE 75 MG
500 CAPSULE ORAL DAILY
COMMUNITY

## 2020-01-29 NOTE — PROGRESS NOTES
SUBJECTIVE:    Patient ID: Song Lopez Jr. is a 79 y.o. male.    Chief Complaint: Follow-up (bottles brought // unsure when the last tetanus shot was // agrees to pneumovax-ac )    This 79-year-old male is retired but stays active doing chores and errands.  He sees cardiologist Dr. Reddy but is now changing to Dr. Magnus May.  He has obstructive sleep apnea is faithful using his CPAP at night.  He quit smoking over 40 years ago.  He also sees Dr. Kenneth Smith due to carotid endarterectomy surgery studies had he continues to get ultrasounds of his carotids yearly.   He sees Dr. Lenard Ross for polycythemia and has CBCs drawn every 2 months.  Occasionally gets a phlebotomy if hematocrit is greater than 50.  Dr. Taylor is the dermatologist to does cryo surgery on his actinic keratoses.      Telephone on 01/15/2020   Component Date Value Ref Range Status    WBC 01/16/2020 9.6  3.8 - 10.8 Thousand/uL Final    RBC 01/16/2020 6.79* 4.20 - 5.80 Million/uL Final    Hemoglobin 01/16/2020 12.8* 13.2 - 17.1 g/dL Final    Hematocrit 01/16/2020 44.2  38.5 - 50.0 % Final    Mean Corpuscular Volume 01/16/2020 65.1* 80.0 - 100.0 fL Final    Mean Corpuscular Hemoglobin 01/16/2020 18.9* 27.0 - 33.0 pg Final    Mean Corpuscular Hemoglobin Conc 01/16/2020 29.0* 32.0 - 36.0 g/dL Final    RDW 01/16/2020 18.2* 11.0 - 15.0 % Final    Platelets 01/16/2020 507* 140 - 400 Thousand/uL Final    MPV 01/16/2020 10.1  7.5 - 12.5 fL Final    Neutrophils Absolute 01/16/2020 6,230  1,500 - 7,800 cells/uL Final    Lymph # 01/16/2020 1,891  850 - 3,900 cells/uL Final    Mono # 01/16/2020 778  200 - 950 cells/uL Final    Eos # 01/16/2020 413  15 - 500 cells/uL Final    Baso # 01/16/2020 288* 0 - 200 cells/uL Final    Neutrophils Relative 01/16/2020 64.9  % Final    Lymph% 01/16/2020 19.7  % Final    Mono% 01/16/2020 8.1  % Final    Eosinophil% 01/16/2020 4.3  % Final    Basophil% 01/16/2020 3.0  % Final    Differential  Comment 01/16/2020 Review of peripheral smear confirms automated results.   Final    Glucose 01/16/2020 94  65 - 99 mg/dL Final    BUN, Bld 01/16/2020 9  7 - 25 mg/dL Final    Creatinine 01/16/2020 1.04  0.70 - 1.18 mg/dL Final    eGFR if non African American 01/16/2020 68  > OR = 60 mL/min/1.73m2 Final    eGFR if African American 01/16/2020 79  > OR = 60 mL/min/1.73m2 Final    BUN/Creatinine Ratio 01/16/2020 NOT APPLICABLE  6 - 22 (calc) Final    Sodium 01/16/2020 135  135 - 146 mmol/L Final    Potassium 01/16/2020 4.8  3.5 - 5.3 mmol/L Final    Chloride 01/16/2020 98  98 - 110 mmol/L Final    CO2 01/16/2020 28  20 - 32 mmol/L Final    Calcium 01/16/2020 10.1  8.6 - 10.3 mg/dL Final    Total Protein 01/16/2020 6.9  6.1 - 8.1 g/dL Final    Albumin 01/16/2020 4.7  3.6 - 5.1 g/dL Final    Globulin, Total 01/16/2020 2.2  1.9 - 3.7 g/dL (calc) Final    Albumin/Globulin Ratio 01/16/2020 2.1  1.0 - 2.5 (calc) Final    Total Bilirubin 01/16/2020 0.7  0.2 - 1.2 mg/dL Final    Alkaline Phosphatase 01/16/2020 71  40 - 115 U/L Final    AST 01/16/2020 16  10 - 35 U/L Final    ALT 01/16/2020 10  9 - 46 U/L Final    Cholesterol 01/16/2020 147  <200 mg/dL Final    HDL 01/16/2020 48  >40 mg/dL Final    Triglycerides 01/16/2020 73  <150 mg/dL Final    LDL Cholesterol 01/16/2020 84  mg/dL (calc) Final    Hdl/Cholesterol Ratio 01/16/2020 3.1  <5.0 (calc) Final    Non HDL Chol. (LDL+VLDL) 01/16/2020 99  <130 mg/dL (calc) Final    TSH 01/16/2020 2.62  0.40 - 4.50 mIU/L Final    Color, UA 01/16/2020 YELLOW  YELLOW Final    Appearance, UA 01/16/2020 CLEAR  CLEAR Final    Specific Gravity, UA 01/16/2020 1.007  1.001 - 1.035 Final    pH, UA 01/16/2020 7.5  5.0 - 8.0 Final    Glucose, UA 01/16/2020 NEGATIVE  NEGATIVE Final    Bilirubin, UA 01/16/2020 NEGATIVE  NEGATIVE Final    Ketones, UA 01/16/2020 NEGATIVE  NEGATIVE Final    Occult Blood UA 01/16/2020 NEGATIVE  NEGATIVE Final    Protein, UA 01/16/2020  NEGATIVE  NEGATIVE Final    Nitrite, UA 01/16/2020 NEGATIVE  NEGATIVE Final    Leukocytes, UA 01/16/2020 NEGATIVE  NEGATIVE Final    WBC Casts, UA 01/16/2020 NONE SEEN  < OR = 5 /HPF Final    RBC Casts, UA 01/16/2020 NONE SEEN  < OR = 2 /HPF Final    Squam Epithel, UA 01/16/2020 NONE SEEN  < OR = 5 /HPF Final    Bacteria, UA 01/16/2020 NONE SEEN  NONE SEEN /HPF Final    Hyaline Casts, UA 01/16/2020 NONE SEEN  NONE SEEN /LPF Final    Reflexive Urine Culture 01/16/2020 NO CULTURE INDICATED   Final   Hospital Outpatient Visit on 09/09/2019   Component Date Value Ref Range Status    POC Creatinine 09/09/2019 1.0  0.5 - 1.4 mg/dL Final    Sample 09/09/2019 ART   Final       Past Medical History:   Diagnosis Date    Chronic gout     Hypertension     Polycythemia     Squamous cell carcinoma     Thyroid disease      Past Surgical History:   Procedure Laterality Date    CAROTID ARTERY ANGIOPLASTY  right    carotid endarderectomy      CATARACT EXTRACTION, BILATERAL      CATARACT EXTRACTION, BILATERAL      left wrist surgery       History reviewed. No pertinent family history.    Marital Status:   Alcohol History:  reports that he drinks alcohol.  Tobacco History:  reports that he has quit smoking. His smoking use included cigarettes. He has a 25.00 pack-year smoking history. He has never used smokeless tobacco.  Drug History:  reports that he does not use drugs.    Review of patient's allergies indicates:   Allergen Reactions    Doxycycline     Penicillins Other (See Comments)     Skin turns red or appears sunburned       Current Outpatient Medications:     allopurinol (ZYLOPRIM) 100 MG tablet, Take 100 mg by mouth once daily., Disp: , Rfl:     brimonidine 0.15 % OPTH DROP (ALPHAGAN) 0.15 % ophthalmic solution, , Disp: , Rfl:     cyanocobalamin (VITAMIN B-12) 500 MCG tablet, Take 500 mcg by mouth once daily., Disp: , Rfl:     diltiazem (TIAZAC) 360 MG CpSR, Take 360 mg by mouth once daily.,  "Disp: , Rfl:     latanoprost 0.005 % ophthalmic solution, Place 1 drop into both eyes every evening., Disp: , Rfl:     levothyroxine (SYNTHROID) 88 MCG tablet, Take 1 tablet (88 mcg total) by mouth before breakfast., Disp: 90 tablet, Rfl: 1    lovastatin (MEVACOR) 20 MG tablet, Take 20 mg by mouth every other day. , Disp: , Rfl:     omeprazole (PRILOSEC) 20 MG capsule, TAKE 1 CAPSULE BY MOUTH ONCE DAILY FOR 90 DAYS, Disp: , Rfl: 1    aspirin (ECOTRIN) 81 MG EC tablet, Take 81 mg by mouth once daily., Disp: , Rfl:     Review of Systems   Constitutional: Negative for appetite change, chills, fatigue, fever and unexpected weight change.   HENT: Negative for congestion, ear pain and trouble swallowing.    Eyes: Negative for pain, discharge and visual disturbance.   Respiratory: Negative for apnea, cough, shortness of breath and wheezing.         Hayden on CPAP   Cardiovascular: Negative for chest pain and leg swelling.   Gastrointestinal: Negative for abdominal pain, blood in stool, constipation, diarrhea, nausea and vomiting.        Omeprazole helps the  GERD   Endocrine: Negative for cold intolerance, heat intolerance and polydipsia.   Genitourinary: Negative for dysuria, hematuria, testicular pain and urgency.   Musculoskeletal: Positive for arthralgias (lf  wrist replacement still aches in cold weather). Negative for gait problem, joint swelling and myalgias.   Skin:        Dr Hoff follows AK's   Neurological: Negative for dizziness, seizures and numbness.   Psychiatric/Behavioral: Negative for agitation, behavioral problems and hallucinations. The patient is not nervous/anxious.           Objective:      Vitals:    01/29/20 0957   BP: 128/60   Pulse: 64   Weight: 74.8 kg (165 lb)   Height: 5' 7" (1.702 m)     Body mass index is 25.84 kg/m².  Physical Exam   Constitutional: He is oriented to person, place, and time. He appears well-developed and well-nourished.   HENT:   Head: Normocephalic and atraumatic.   Right " Ear: External ear normal.   Left Ear: External ear normal.   Nose: Nose normal.   Mouth/Throat: Oropharynx is clear and moist.   Eyes: Pupils are equal, round, and reactive to light. EOM are normal.   Neck: Normal range of motion. Neck supple. Carotid bruit is not present. No thyromegaly present.   Cardiovascular: Normal rate, regular rhythm, normal heart sounds and intact distal pulses.   No murmur heard.  Pulmonary/Chest: Effort normal and breath sounds normal. He has no wheezes. He has no rales.   Abdominal: Soft. Bowel sounds are normal. He exhibits no distension. There is no hepatosplenomegaly. There is no tenderness.   Musculoskeletal: Normal range of motion. He exhibits no tenderness or deformity.        Lumbar back: Normal. He exhibits no pain and no spasm.   Bends 90 degrees at  waist shoulders have full range of motion knees have no crepitance he has no peripheral edema noted.   Lymphadenopathy:     He has no cervical adenopathy.   Neurological: He is alert and oriented to person, place, and time. No cranial nerve deficit. Coordination normal.   Skin: Skin is warm and dry. No rash noted.   Actinic keratoses noted on the scalp   Psychiatric: He has a normal mood and affect. His behavior is normal. Judgment and thought content normal.   Nursing note and vitals reviewed.        Assessment:       1. Polycythemia, secondary    2. Need for vaccination    3. Primary osteoarthritis of right hip    4. Sleep apnea in adult    5. Hypothyroidism, unspecified type    6. Primary open angle glaucoma of both eyes, mild stage    7. Pulmonary fibrosis, unspecified    8. Bilateral carotid artery stenosis    9. Mixed hyperlipidemia    10. Essential hypertension    11. Atherosclerosis of both carotid arteries    12. Enlarged prostate without lower urinary tract symptoms (luts)    13. Polycythemia vera    14. Gastro-esophageal reflux disease without esophagitis    15. Dependence on other enabling machines and devices    16.  Former smoker         Plan:       Polycythemia, secondary  Continue getting his CBC Q 2 months with Dr. Lenard Clark  Need for vaccination  -     Pneumococcal Polysaccharide Vaccine (23 Valent) (SQ/IM)  Pneumovax scheduled today  Primary osteoarthritis of right hip    Sleep apnea in adult  Continue CPAP use  Hypothyroidism, unspecified type  Continue levothyroxine 88 mcg daily  Primary open angle glaucoma of both eyes, mild stage    Pulmonary fibrosis, unspecified  Stable at this time  Bilateral carotid artery stenosis  Monitored yearly with carotid ultrasound Dr. Kenneth Smith   Mixed hyperlipidemia  Cholesterol at goal on current medications  Essential hypertension    Atherosclerosis of both carotid arteries    Enlarged prostate without lower urinary tract symptoms (luts)    Polycythemia vera    Gastro-esophageal reflux disease without esophagitis    Dependence on other enabling machines and devices    Former smoker      Follow up in about 6 months (around 7/29/2020).

## 2020-02-13 ENCOUNTER — OFFICE VISIT (OUTPATIENT)
Dept: FAMILY MEDICINE | Facility: CLINIC | Age: 80
End: 2020-02-13
Payer: MEDICARE

## 2020-02-13 VITALS
HEART RATE: 72 BPM | DIASTOLIC BLOOD PRESSURE: 64 MMHG | BODY MASS INDEX: 26.34 KG/M2 | SYSTOLIC BLOOD PRESSURE: 128 MMHG | HEIGHT: 67 IN | WEIGHT: 167.81 LBS

## 2020-02-13 DIAGNOSIS — R05.9 COUGH: ICD-10-CM

## 2020-02-13 DIAGNOSIS — J20.9 ACUTE BRONCHITIS, UNSPECIFIED ORGANISM: Primary | ICD-10-CM

## 2020-02-13 PROCEDURE — 1101F PT FALLS ASSESS-DOCD LE1/YR: CPT | Mod: S$GLB,,, | Performed by: NURSE PRACTITIONER

## 2020-02-13 PROCEDURE — 1159F MED LIST DOCD IN RCRD: CPT | Mod: S$GLB,,, | Performed by: NURSE PRACTITIONER

## 2020-02-13 PROCEDURE — 1101F PR PT FALLS ASSESS DOC 0-1 FALLS W/OUT INJ PAST YR: ICD-10-PCS | Mod: S$GLB,,, | Performed by: NURSE PRACTITIONER

## 2020-02-13 PROCEDURE — 1126F PR PAIN SEVERITY QUANTIFIED, NO PAIN PRESENT: ICD-10-PCS | Mod: S$GLB,,, | Performed by: NURSE PRACTITIONER

## 2020-02-13 PROCEDURE — 1159F PR MEDICATION LIST DOCUMENTED IN MEDICAL RECORD: ICD-10-PCS | Mod: S$GLB,,, | Performed by: NURSE PRACTITIONER

## 2020-02-13 PROCEDURE — 96372 THER/PROPH/DIAG INJ SC/IM: CPT | Mod: S$GLB,,, | Performed by: NURSE PRACTITIONER

## 2020-02-13 PROCEDURE — 3074F SYST BP LT 130 MM HG: CPT | Mod: S$GLB,,, | Performed by: NURSE PRACTITIONER

## 2020-02-13 PROCEDURE — 3078F PR MOST RECENT DIASTOLIC BLOOD PRESSURE < 80 MM HG: ICD-10-PCS | Mod: S$GLB,,, | Performed by: NURSE PRACTITIONER

## 2020-02-13 PROCEDURE — 99213 OFFICE O/P EST LOW 20 MIN: CPT | Mod: 25,S$GLB,, | Performed by: NURSE PRACTITIONER

## 2020-02-13 PROCEDURE — 3078F DIAST BP <80 MM HG: CPT | Mod: S$GLB,,, | Performed by: NURSE PRACTITIONER

## 2020-02-13 PROCEDURE — 1126F AMNT PAIN NOTED NONE PRSNT: CPT | Mod: S$GLB,,, | Performed by: NURSE PRACTITIONER

## 2020-02-13 PROCEDURE — 96372 PR INJECTION,THERAP/PROPH/DIAG2ST, IM OR SUBCUT: ICD-10-PCS | Mod: S$GLB,,, | Performed by: NURSE PRACTITIONER

## 2020-02-13 PROCEDURE — 99213 PR OFFICE/OUTPT VISIT, EST, LEVL III, 20-29 MIN: ICD-10-PCS | Mod: 25,S$GLB,, | Performed by: NURSE PRACTITIONER

## 2020-02-13 PROCEDURE — 3074F PR MOST RECENT SYSTOLIC BLOOD PRESSURE < 130 MM HG: ICD-10-PCS | Mod: S$GLB,,, | Performed by: NURSE PRACTITIONER

## 2020-02-13 RX ORDER — DEXAMETHASONE SODIUM PHOSPHATE 4 MG/ML
4 INJECTION, SOLUTION INTRA-ARTICULAR; INTRALESIONAL; INTRAMUSCULAR; INTRAVENOUS; SOFT TISSUE ONCE
Status: COMPLETED | OUTPATIENT
Start: 2020-02-13 | End: 2020-02-13

## 2020-02-13 RX ORDER — BENZONATATE 100 MG/1
100 CAPSULE ORAL 3 TIMES DAILY PRN
Qty: 30 CAPSULE | Refills: 1 | Status: SHIPPED | OUTPATIENT
Start: 2020-02-13 | End: 2020-02-23

## 2020-02-13 RX ADMIN — DEXAMETHASONE SODIUM PHOSPHATE 4 MG: 4 INJECTION, SOLUTION INTRA-ARTICULAR; INTRALESIONAL; INTRAMUSCULAR; INTRAVENOUS; SOFT TISSUE at 11:02

## 2020-02-13 NOTE — PROGRESS NOTES
SUBJECTIVE:    Patient ID: Song Lopez Jr. is a 79 y.o. male.    Chief Complaint: Cough (Only at night.....x 2 weeks.....(wife treated for same condition 3 weeks ago).....NO bottles.....mlr)    Pt presents with c/o coughing x 1 week. Denies fever, chills, or body aches. Some congestion, sore throat, and PND. Cough mostly nonproductive. When productive, yellow/clear sputum. Reports wife was seen a few weeks ago with same problem and was given antibiotics and a steroid injection. Says he usually has symptoms like this in the summertime but that the weather has been un-seasonal lately.         Telephone on 01/15/2020   Component Date Value Ref Range Status    WBC 01/16/2020 9.6  3.8 - 10.8 Thousand/uL Final    RBC 01/16/2020 6.79* 4.20 - 5.80 Million/uL Final    Hemoglobin 01/16/2020 12.8* 13.2 - 17.1 g/dL Final    Hematocrit 01/16/2020 44.2  38.5 - 50.0 % Final    Mean Corpuscular Volume 01/16/2020 65.1* 80.0 - 100.0 fL Final    Mean Corpuscular Hemoglobin 01/16/2020 18.9* 27.0 - 33.0 pg Final    Mean Corpuscular Hemoglobin Conc 01/16/2020 29.0* 32.0 - 36.0 g/dL Final    RDW 01/16/2020 18.2* 11.0 - 15.0 % Final    Platelets 01/16/2020 507* 140 - 400 Thousand/uL Final    MPV 01/16/2020 10.1  7.5 - 12.5 fL Final    Neutrophils Absolute 01/16/2020 6,230  1,500 - 7,800 cells/uL Final    Lymph # 01/16/2020 1,891  850 - 3,900 cells/uL Final    Mono # 01/16/2020 778  200 - 950 cells/uL Final    Eos # 01/16/2020 413  15 - 500 cells/uL Final    Baso # 01/16/2020 288* 0 - 200 cells/uL Final    Neutrophils Relative 01/16/2020 64.9  % Final    Lymph% 01/16/2020 19.7  % Final    Mono% 01/16/2020 8.1  % Final    Eosinophil% 01/16/2020 4.3  % Final    Basophil% 01/16/2020 3.0  % Final    Differential Comment 01/16/2020 Review of peripheral smear confirms automated results.   Final    Glucose 01/16/2020 94  65 - 99 mg/dL Final    BUN, Bld 01/16/2020 9  7 - 25 mg/dL Final    Creatinine 01/16/2020 1.04   0.70 - 1.18 mg/dL Final    eGFR if non African American 01/16/2020 68  > OR = 60 mL/min/1.73m2 Final    eGFR if African American 01/16/2020 79  > OR = 60 mL/min/1.73m2 Final    BUN/Creatinine Ratio 01/16/2020 NOT APPLICABLE  6 - 22 (calc) Final    Sodium 01/16/2020 135  135 - 146 mmol/L Final    Potassium 01/16/2020 4.8  3.5 - 5.3 mmol/L Final    Chloride 01/16/2020 98  98 - 110 mmol/L Final    CO2 01/16/2020 28  20 - 32 mmol/L Final    Calcium 01/16/2020 10.1  8.6 - 10.3 mg/dL Final    Total Protein 01/16/2020 6.9  6.1 - 8.1 g/dL Final    Albumin 01/16/2020 4.7  3.6 - 5.1 g/dL Final    Globulin, Total 01/16/2020 2.2  1.9 - 3.7 g/dL (calc) Final    Albumin/Globulin Ratio 01/16/2020 2.1  1.0 - 2.5 (calc) Final    Total Bilirubin 01/16/2020 0.7  0.2 - 1.2 mg/dL Final    Alkaline Phosphatase 01/16/2020 71  40 - 115 U/L Final    AST 01/16/2020 16  10 - 35 U/L Final    ALT 01/16/2020 10  9 - 46 U/L Final    Cholesterol 01/16/2020 147  <200 mg/dL Final    HDL 01/16/2020 48  >40 mg/dL Final    Triglycerides 01/16/2020 73  <150 mg/dL Final    LDL Cholesterol 01/16/2020 84  mg/dL (calc) Final    Hdl/Cholesterol Ratio 01/16/2020 3.1  <5.0 (calc) Final    Non HDL Chol. (LDL+VLDL) 01/16/2020 99  <130 mg/dL (calc) Final    TSH 01/16/2020 2.62  0.40 - 4.50 mIU/L Final    Color, UA 01/16/2020 YELLOW  YELLOW Final    Appearance, UA 01/16/2020 CLEAR  CLEAR Final    Specific Gravity, UA 01/16/2020 1.007  1.001 - 1.035 Final    pH, UA 01/16/2020 7.5  5.0 - 8.0 Final    Glucose, UA 01/16/2020 NEGATIVE  NEGATIVE Final    Bilirubin, UA 01/16/2020 NEGATIVE  NEGATIVE Final    Ketones, UA 01/16/2020 NEGATIVE  NEGATIVE Final    Occult Blood UA 01/16/2020 NEGATIVE  NEGATIVE Final    Protein, UA 01/16/2020 NEGATIVE  NEGATIVE Final    Nitrite, UA 01/16/2020 NEGATIVE  NEGATIVE Final    Leukocytes, UA 01/16/2020 NEGATIVE  NEGATIVE Final    WBC Casts, UA 01/16/2020 NONE SEEN  < OR = 5 /HPF Final    RBC Casts, UA  01/16/2020 NONE SEEN  < OR = 2 /HPF Final    Squam Epithel, UA 01/16/2020 NONE SEEN  < OR = 5 /HPF Final    Bacteria, UA 01/16/2020 NONE SEEN  NONE SEEN /HPF Final    Hyaline Casts, UA 01/16/2020 NONE SEEN  NONE SEEN /LPF Final    Reflexive Urine Culture 01/16/2020 NO CULTURE INDICATED   Final   Hospital Outpatient Visit on 09/09/2019   Component Date Value Ref Range Status    POC Creatinine 09/09/2019 1.0  0.5 - 1.4 mg/dL Final    Sample 09/09/2019 ART   Final       Past Medical History:   Diagnosis Date    Chronic gout     Hypertension     Polycythemia     Squamous cell carcinoma     Thyroid disease      Past Surgical History:   Procedure Laterality Date    CAROTID ARTERY ANGIOPLASTY  right    carotid endarderectomy      CATARACT EXTRACTION, BILATERAL      CATARACT EXTRACTION, BILATERAL      left wrist surgery       History reviewed. No pertinent family history.    Marital Status:   Alcohol History:  reports that he drinks alcohol.  Tobacco History:  reports that he has quit smoking. His smoking use included cigarettes. He has a 25.00 pack-year smoking history. He has never used smokeless tobacco.  Drug History:  reports that he does not use drugs.    Review of patient's allergies indicates:   Allergen Reactions    Doxycycline     Penicillins Other (See Comments)     Skin turns red or appears sunburned       Current Outpatient Medications:     allopurinol (ZYLOPRIM) 100 MG tablet, Take 1 tablet (100 mg total) by mouth once daily., Disp: 90 tablet, Rfl: 1    aspirin (ECOTRIN) 81 MG EC tablet, Take 81 mg by mouth once daily., Disp: , Rfl:     brimonidine 0.15 % OPTH DROP (ALPHAGAN) 0.15 % ophthalmic solution, , Disp: , Rfl:     cyanocobalamin (VITAMIN B-12) 500 MCG tablet, Take 500 mcg by mouth once daily., Disp: , Rfl:     diltiaZEM (TIAZAC) 360 MG Cs24, Take 1 capsule (360 mg total) by mouth once daily., Disp: 90 capsule, Rfl: 1    latanoprost 0.005 % ophthalmic solution, Place 1  "drop into both eyes every evening., Disp: , Rfl:     levothyroxine (SYNTHROID) 88 MCG tablet, Take 1 tablet (88 mcg total) by mouth before breakfast., Disp: 90 tablet, Rfl: 1    lovastatin (MEVACOR) 20 MG tablet, Take 1 tablet (20 mg total) by mouth every other day., Disp: 45 tablet, Rfl: 1    omeprazole (PRILOSEC) 20 MG capsule, Take 1 capsule (20 mg total) by mouth once daily., Disp: 90 capsule, Rfl: 1    benzonatate (TESSALON) 100 MG capsule, Take 1 capsule (100 mg total) by mouth 3 (three) times daily as needed for Cough., Disp: 30 capsule, Rfl: 1  No current facility-administered medications for this visit.     Review of Systems   Constitutional: Negative for chills and fever.   HENT: Positive for congestion, postnasal drip, rhinorrhea and sore throat. Negative for ear pain, sinus pressure and sinus pain.    Eyes: Negative for pain and redness.   Respiratory: Positive for cough. Negative for shortness of breath and wheezing.    Cardiovascular: Negative for chest pain.   Gastrointestinal: Negative for nausea.   Genitourinary: Negative for dysuria.   Musculoskeletal: Negative for myalgias and neck pain.   Skin: Negative.    Allergic/Immunologic: Negative.    Neurological: Negative for dizziness, weakness and headaches.   Psychiatric/Behavioral: Negative.           Objective:      Vitals:    02/13/20 1107   BP: 128/64   Pulse: 72   Weight: 76.1 kg (167 lb 12.8 oz)   Height: 5' 7" (1.702 m)     Body mass index is 26.28 kg/m².  Physical Exam   Constitutional: He is oriented to person, place, and time. He appears well-developed and well-nourished. No distress.   HENT:   Head: Normocephalic.   Nose: Rhinorrhea present.   Mouth/Throat: Posterior oropharyngeal erythema present.   Eyes: Pupils are equal, round, and reactive to light.   Neck: Normal range of motion.   Cardiovascular: Normal rate and regular rhythm.   Pulmonary/Chest: Effort normal and breath sounds normal. He has no wheezes.   Abdominal: Soft. "   Musculoskeletal: Normal range of motion.   Lymphadenopathy:     He has no cervical adenopathy.   Neurological: He is alert and oriented to person, place, and time.   Skin: Skin is warm and dry.   Psychiatric: He has a normal mood and affect.         Assessment:       1. Acute bronchitis, unspecified organism    2. Cough         Plan:       Acute bronchitis, unspecified organism  -     dexamethasone injection 4 mg  - Allergy medication such as zyrtec or claritin daily. Robitussin DM at night for cough.    Cough  -     benzonatate (TESSALON) 100 MG capsule; Take 1 capsule (100 mg total) by mouth 3 (three) times daily as needed for Cough.  Dispense: 30 capsule; Refill: 1      Follow up if symptoms worsen or fail to improve.

## 2020-03-16 ENCOUNTER — TELEPHONE (OUTPATIENT)
Dept: FAMILY MEDICINE | Facility: CLINIC | Age: 80
End: 2020-03-16

## 2020-03-16 DIAGNOSIS — G47.33 OSA ON CPAP: Primary | ICD-10-CM

## 2020-03-16 NOTE — TELEPHONE ENCOUNTER
Can you please find out what his settings are and what type of mask he uses. I cannot place the order unless I know the settings unfortunately.

## 2020-03-16 NOTE — TELEPHONE ENCOUNTER
----- Message from Debbie Aleman sent at 3/16/2020  3:10 PM CDT -----  Can you please send an order to get cpap machine to [facility].  It broke

## 2020-03-30 ENCOUNTER — TELEPHONE (OUTPATIENT)
Dept: FAMILY MEDICINE | Facility: CLINIC | Age: 80
End: 2020-03-30

## 2020-03-30 NOTE — TELEPHONE ENCOUNTER
----- Message from Bernie Almeida sent at 3/30/2020 12:01 PM CDT -----  Contact: Song  The patients C-PAP machine is approved. The machine he had they do not make it anymore. He is getting a new one. [employer] faxed us a form for his settings. 65 was for his old machine. If you did not get the fax Please call [employer] and see what they need. He has been with out his machine for about 2 weeks . pts # [phone]

## 2020-05-15 ENCOUNTER — TELEPHONE (OUTPATIENT)
Dept: FAMILY MEDICINE | Facility: CLINIC | Age: 80
End: 2020-05-15

## 2020-05-15 RX ORDER — ROSUVASTATIN CALCIUM 10 MG/1
10 TABLET, COATED ORAL DAILY
Qty: 90 TABLET | Refills: 3 | Status: SHIPPED | OUTPATIENT
Start: 2020-05-15 | End: 2020-07-20 | Stop reason: SDUPTHER

## 2020-05-15 NOTE — TELEPHONE ENCOUNTER
----- Message from Ruba Gan sent at 5/15/2020 10:18 AM CDT -----  Contact: Zahra gibson/ [facility]   Pharmacy is calling in regards to the Diltiazem is having an interaction with the Lovastatin that he is on . Pharmacy would like to know if Dr. Kovacs would like to continue to dispense it ?   Call Pharmacy # [phone]

## 2020-05-18 ENCOUNTER — TELEPHONE (OUTPATIENT)
Dept: FAMILY MEDICINE | Facility: CLINIC | Age: 80
End: 2020-05-18

## 2020-05-18 NOTE — TELEPHONE ENCOUNTER
Spoke to Zahra at [facility] pharmacy. Advised we sent in Rosuvastatin on Friday May 15th. She didn't realize we wanted to cancel the lovastatin and replace it with that so she entered it into the system.

## 2020-05-18 NOTE — TELEPHONE ENCOUNTER
----- Message from Rena Ralph MA sent at 5/18/2020  2:55 PM CDT -----  [facility] called in because pt is requesting to refill the Diltiazem, however he is also taking Lovastatin.  According to pharmacy the two together can cause an increase in muscle aches.  Is Dr. Kovacs of with pt taking both of the meds?    [facility] - [phone]

## 2020-05-18 NOTE — TELEPHONE ENCOUNTER
It looks like this was already addressed on 5/15. Loretta switched him from Lovastatin to Rosuvastatin.

## 2020-05-26 ENCOUNTER — LAB VISIT (OUTPATIENT)
Dept: PRIMARY CARE CLINIC | Facility: CLINIC | Age: 80
End: 2020-05-26
Payer: MEDICARE

## 2020-05-26 DIAGNOSIS — R05.9 COUGH: Primary | ICD-10-CM

## 2020-05-26 PROCEDURE — U0003 INFECTIOUS AGENT DETECTION BY NUCLEIC ACID (DNA OR RNA); SEVERE ACUTE RESPIRATORY SYNDROME CORONAVIRUS 2 (SARS-COV-2) (CORONAVIRUS DISEASE [COVID-19]), AMPLIFIED PROBE TECHNIQUE, MAKING USE OF HIGH THROUGHPUT TECHNOLOGIES AS DESCRIBED BY CMS-2020-01-R: HCPCS

## 2020-05-27 LAB — SARS-COV-2 RNA RESP QL NAA+PROBE: NOT DETECTED

## 2020-06-29 ENCOUNTER — OFFICE VISIT (OUTPATIENT)
Dept: PULMONOLOGY | Facility: CLINIC | Age: 80
End: 2020-06-29
Payer: MEDICARE

## 2020-06-29 VITALS
OXYGEN SATURATION: 99 % | HEIGHT: 67 IN | TEMPERATURE: 97 F | BODY MASS INDEX: 25.43 KG/M2 | HEART RATE: 61 BPM | WEIGHT: 162 LBS | DIASTOLIC BLOOD PRESSURE: 60 MMHG | SYSTOLIC BLOOD PRESSURE: 110 MMHG

## 2020-06-29 DIAGNOSIS — G47.33 OSA (OBSTRUCTIVE SLEEP APNEA): Primary | ICD-10-CM

## 2020-06-29 DIAGNOSIS — J84.10 PULMONARY FIBROSIS, UNSPECIFIED: ICD-10-CM

## 2020-06-29 PROCEDURE — 3078F DIAST BP <80 MM HG: CPT | Mod: S$GLB,,, | Performed by: INTERNAL MEDICINE

## 2020-06-29 PROCEDURE — 99204 OFFICE O/P NEW MOD 45 MIN: CPT | Mod: S$GLB,,, | Performed by: INTERNAL MEDICINE

## 2020-06-29 PROCEDURE — 1159F MED LIST DOCD IN RCRD: CPT | Mod: S$GLB,,, | Performed by: INTERNAL MEDICINE

## 2020-06-29 PROCEDURE — 1101F PT FALLS ASSESS-DOCD LE1/YR: CPT | Mod: S$GLB,,, | Performed by: INTERNAL MEDICINE

## 2020-06-29 PROCEDURE — 3074F SYST BP LT 130 MM HG: CPT | Mod: S$GLB,,, | Performed by: INTERNAL MEDICINE

## 2020-06-29 PROCEDURE — 1159F PR MEDICATION LIST DOCUMENTED IN MEDICAL RECORD: ICD-10-PCS | Mod: S$GLB,,, | Performed by: INTERNAL MEDICINE

## 2020-06-29 PROCEDURE — 1101F PR PT FALLS ASSESS DOC 0-1 FALLS W/OUT INJ PAST YR: ICD-10-PCS | Mod: S$GLB,,, | Performed by: INTERNAL MEDICINE

## 2020-06-29 PROCEDURE — 99204 PR OFFICE/OUTPT VISIT, NEW, LEVL IV, 45-59 MIN: ICD-10-PCS | Mod: S$GLB,,, | Performed by: INTERNAL MEDICINE

## 2020-06-29 PROCEDURE — 1126F PR PAIN SEVERITY QUANTIFIED, NO PAIN PRESENT: ICD-10-PCS | Mod: S$GLB,,, | Performed by: INTERNAL MEDICINE

## 2020-06-29 PROCEDURE — 1126F AMNT PAIN NOTED NONE PRSNT: CPT | Mod: S$GLB,,, | Performed by: INTERNAL MEDICINE

## 2020-06-29 PROCEDURE — 3078F PR MOST RECENT DIASTOLIC BLOOD PRESSURE < 80 MM HG: ICD-10-PCS | Mod: S$GLB,,, | Performed by: INTERNAL MEDICINE

## 2020-06-29 PROCEDURE — 3074F PR MOST RECENT SYSTOLIC BLOOD PRESSURE < 130 MM HG: ICD-10-PCS | Mod: S$GLB,,, | Performed by: INTERNAL MEDICINE

## 2020-06-29 NOTE — PROGRESS NOTES
SUBJECTIVE:    Patient ID: Song Lopez Jr. is a 80 y.o. male.    Chief Complaint: Apnea    HPI the patient is here with a new CPAP machine that blows too hard and he cannot sleep with it. It is set on 6.5cm CPAP which is following a short period of 4cm of ramp.  He got a new machine because his old one .  Past Medical History:   Diagnosis Date    Chronic gout     Hypertension     TERI (obstructive sleep apnea)     Polycythemia     Squamous cell carcinoma     Thyroid disease      Past Surgical History:   Procedure Laterality Date    CAROTID ARTERY ANGIOPLASTY  right    carotid endarderectomy      CATARACT EXTRACTION, BILATERAL      CATARACT EXTRACTION, BILATERAL      left wrist surgery       History reviewed. No pertinent family history.     Social History:   Marital Status:   Occupation: Data Unavailable  Alcohol History:  reports current alcohol use.  Tobacco History:  reports that he has quit smoking. His smoking use included cigarettes. He has a 25.00 pack-year smoking history. He has never used smokeless tobacco.  Drug History:  reports no history of drug use.    Review of patient's allergies indicates:   Allergen Reactions    Doxycycline     Penicillins Other (See Comments)     Skin turns red or appears sunburned       Current Outpatient Medications   Medication Sig Dispense Refill    allopurinol (ZYLOPRIM) 100 MG tablet Take 1 tablet (100 mg total) by mouth once daily. 90 tablet 1    aspirin (ECOTRIN) 81 MG EC tablet Take 81 mg by mouth once daily.      brimonidine 0.15 % OPTH DROP (ALPHAGAN) 0.15 % ophthalmic solution       cyanocobalamin (VITAMIN B-12) 500 MCG tablet Take 500 mcg by mouth once daily.      latanoprost 0.005 % ophthalmic solution Place 1 drop into both eyes every evening.      levothyroxine (SYNTHROID) 88 MCG tablet Take 1 tablet (88 mcg total) by mouth before breakfast. 90 tablet 1    lovastatin (MEVACOR) 20 MG tablet Take 1 tablet (20 mg total) by mouth  "every other day. 45 tablet 1    omeprazole (PRILOSEC) 20 MG capsule Take 1 capsule (20 mg total) by mouth once daily. 90 capsule 1    rosuvastatin (CRESTOR) 10 MG tablet Take 1 tablet (10 mg total) by mouth once daily. 90 tablet 3    diltiaZEM (TIAZAC) 360 MG Cs24 Take 1 capsule (360 mg total) by mouth once daily. (Patient not taking: Reported on 6/29/2020) 90 capsule 1     No current facility-administered medications for this visit.        Alpha-1 Antitrypsin:  Last PFT:   Last CT:    Review of Systems  General: Feeling good.   Eyes: Vision is about the same.  ENT:  No sinusitis or pharyngitis.   Heart:: No chest pain or palpitations.  Lungs: No cough, sputum, or wheezing.  GI: No Nausea, vomiting, constipation, diarrhea, or reflux.  : Nocturia x 2  Musculoskeletal: L wrist hurts sometimes  Skin: No lesions or rashes.Bruises  Neuro: No headaches or neuropathy.  Lymph: No edema or adenopathy.  Psych: No anxiety or depression.  Endo: No weight change.    OBJECTIVE:      /60 (BP Location: Left arm, Patient Position: Sitting, BP Method: Medium (Manual))   Pulse 61   Temp 97 °F (36.1 °C)   Ht 5' 7" (1.702 m)   Wt 73.5 kg (162 lb)   SpO2 99%   BMI 25.37 kg/m²     Physical Exam  GENERAL: Older patient in no distress.  HEENT: Pupils equal and reactive. Extraocular movements intact. Nose intact.      Pharynx moist.  NECK: Supple.   HEART: Regular rate and rhythm. No murmur or gallop auscultated.  LUNGS: Clear to auscultation and percussion. Lung excursion symmetrical. No change in fremitus. No adventitial noises.  ABDOMEN: Bowel sounds present. Non-tender, no masses palpated.  EXTREMITIES: Normal muscle tone and joint movement, no cyanosis or clubbing.   LYMPHATICS: No adenopathy palpated, no edema.  SKIN: Dry, intact, no lesions.   NEURO: Cranial nerves II-XII intact. Motor strength 5/5 bilaterally, upper and lower extremities.  PSYCH: Appropriate affect.    Assessment:       1. TERI (obstructive sleep " apnea)    2. Pulmonary fibrosis, unspecified          Plan:       TERI (obstructive sleep apnea)  Comments:  Last study was in June of 2015  Orders:  -     CPAP Titration (Must have dx of TERI from previous sleep study.); Future; Expected date: 06/29/2020    Pulmonary fibrosis, unspecified  Comments:  Stable         Follow up in about 2 months (around 8/29/2020).

## 2020-07-01 ENCOUNTER — TELEPHONE (OUTPATIENT)
Dept: PULMONOLOGY | Facility: CLINIC | Age: 80
End: 2020-07-01

## 2020-07-01 DIAGNOSIS — G47.33 OSA (OBSTRUCTIVE SLEEP APNEA): ICD-10-CM

## 2020-07-01 DIAGNOSIS — Z01.818 OTHER SPECIFIED PRE-OPERATIVE EXAMINATION: Primary | ICD-10-CM

## 2020-07-01 NOTE — TELEPHONE ENCOUNTER
----- Message from Temo Padilla MA sent at 6/30/2020  9:08 AM CDT -----  Regarding: FW: COVID ORDERS NEEDED  Contact: [phone]    ----- Message -----  From: Mercy Lawler  Sent: 6/30/2020   7:46 AM CDT  To: Fabian Miller Staff  Subject: COVID ORDERS NEEDED                              Dr. Peralta and Staff,    It has been decided that each in-lab Sleep Study Order should be accompanied with a COVID Screening Order.   I have an in-lab order for a CPAP Titration Study for this PT.  Please add the screening order to Epic.  Use the [id number]/Covid Screening & DX code: Z01.818 to order the Covid Screen.  Home Sleep Studies do not require the Covid Screen order, only in-lab Sleep Studies, such as PSG; CPAP & SPLIT nights.      Thank you,Renay

## 2020-07-11 ENCOUNTER — LAB VISIT (OUTPATIENT)
Dept: PRIMARY CARE CLINIC | Facility: CLINIC | Age: 80
End: 2020-07-11
Payer: MEDICARE

## 2020-07-11 DIAGNOSIS — G47.33 OSA (OBSTRUCTIVE SLEEP APNEA): ICD-10-CM

## 2020-07-11 DIAGNOSIS — Z01.818 OTHER SPECIFIED PRE-OPERATIVE EXAMINATION: ICD-10-CM

## 2020-07-11 PROCEDURE — U0003 INFECTIOUS AGENT DETECTION BY NUCLEIC ACID (DNA OR RNA); SEVERE ACUTE RESPIRATORY SYNDROME CORONAVIRUS 2 (SARS-COV-2) (CORONAVIRUS DISEASE [COVID-19]), AMPLIFIED PROBE TECHNIQUE, MAKING USE OF HIGH THROUGHPUT TECHNOLOGIES AS DESCRIBED BY CMS-2020-01-R: HCPCS

## 2020-07-12 LAB — SARS-COV-2 RNA RESP QL NAA+PROBE: NOT DETECTED

## 2020-07-14 ENCOUNTER — PROCEDURE VISIT (OUTPATIENT)
Dept: SLEEP MEDICINE | Facility: HOSPITAL | Age: 80
End: 2020-07-14
Attending: INTERNAL MEDICINE
Payer: MEDICARE

## 2020-07-14 DIAGNOSIS — G47.33 OSA (OBSTRUCTIVE SLEEP APNEA): ICD-10-CM

## 2020-07-14 PROCEDURE — 95811 POLYSOM 6/>YRS CPAP 4/> PARM: CPT

## 2020-07-17 ENCOUNTER — TELEPHONE (OUTPATIENT)
Dept: PULMONOLOGY | Facility: HOSPITAL | Age: 80
End: 2020-07-17

## 2020-07-17 DIAGNOSIS — G47.33 OSA (OBSTRUCTIVE SLEEP APNEA): Primary | ICD-10-CM

## 2020-07-17 NOTE — TELEPHONE ENCOUNTER
CPAP titration shows he only needs 6cm of CPAP.  He used a medium Respironics Dream Wisp nasal mask.  He needs 45 minutes of ramp time.

## 2020-07-20 ENCOUNTER — OFFICE VISIT (OUTPATIENT)
Dept: FAMILY MEDICINE | Facility: CLINIC | Age: 80
End: 2020-07-20
Payer: MEDICARE

## 2020-07-20 VITALS
HEART RATE: 60 BPM | WEIGHT: 160 LBS | SYSTOLIC BLOOD PRESSURE: 112 MMHG | BODY MASS INDEX: 25.11 KG/M2 | DIASTOLIC BLOOD PRESSURE: 58 MMHG | HEIGHT: 67 IN

## 2020-07-20 DIAGNOSIS — N40.0 ENLARGED PROSTATE WITHOUT LOWER URINARY TRACT SYMPTOMS (LUTS): ICD-10-CM

## 2020-07-20 DIAGNOSIS — M19.90 OSTEOARTHRITIS, UNSPECIFIED OSTEOARTHRITIS TYPE, UNSPECIFIED SITE: ICD-10-CM

## 2020-07-20 DIAGNOSIS — M10.9 GOUT, UNSPECIFIED CAUSE, UNSPECIFIED CHRONICITY, UNSPECIFIED SITE: ICD-10-CM

## 2020-07-20 DIAGNOSIS — I10 ESSENTIAL HYPERTENSION: ICD-10-CM

## 2020-07-20 DIAGNOSIS — G31.84 MCI (MILD COGNITIVE IMPAIRMENT): Primary | ICD-10-CM

## 2020-07-20 DIAGNOSIS — K21.9 GASTRO-ESOPHAGEAL REFLUX DISEASE WITHOUT ESOPHAGITIS: ICD-10-CM

## 2020-07-20 DIAGNOSIS — E03.9 HYPOTHYROIDISM, UNSPECIFIED TYPE: ICD-10-CM

## 2020-07-20 DIAGNOSIS — I65.23 ATHEROSCLEROSIS OF BOTH CAROTID ARTERIES: ICD-10-CM

## 2020-07-20 DIAGNOSIS — Z99.89 DEPENDENCE ON OTHER ENABLING MACHINES AND DEVICES: ICD-10-CM

## 2020-07-20 DIAGNOSIS — D75.1 POLYCYTHEMIA, SECONDARY: ICD-10-CM

## 2020-07-20 DIAGNOSIS — G47.33 OSA ON CPAP: ICD-10-CM

## 2020-07-20 DIAGNOSIS — E78.2 MIXED HYPERLIPIDEMIA: ICD-10-CM

## 2020-07-20 DIAGNOSIS — J84.10 PULMONARY FIBROSIS, UNSPECIFIED: ICD-10-CM

## 2020-07-20 PROCEDURE — 99214 OFFICE O/P EST MOD 30 MIN: CPT | Mod: S$GLB,,, | Performed by: FAMILY MEDICINE

## 2020-07-20 PROCEDURE — 3074F SYST BP LT 130 MM HG: CPT | Mod: S$GLB,,, | Performed by: FAMILY MEDICINE

## 2020-07-20 PROCEDURE — 1101F PR PT FALLS ASSESS DOC 0-1 FALLS W/OUT INJ PAST YR: ICD-10-PCS | Mod: S$GLB,,, | Performed by: FAMILY MEDICINE

## 2020-07-20 PROCEDURE — 99214 PR OFFICE/OUTPT VISIT, EST, LEVL IV, 30-39 MIN: ICD-10-PCS | Mod: S$GLB,,, | Performed by: FAMILY MEDICINE

## 2020-07-20 PROCEDURE — 1159F PR MEDICATION LIST DOCUMENTED IN MEDICAL RECORD: ICD-10-PCS | Mod: S$GLB,,, | Performed by: FAMILY MEDICINE

## 2020-07-20 PROCEDURE — 3078F DIAST BP <80 MM HG: CPT | Mod: S$GLB,,, | Performed by: FAMILY MEDICINE

## 2020-07-20 PROCEDURE — 1159F MED LIST DOCD IN RCRD: CPT | Mod: S$GLB,,, | Performed by: FAMILY MEDICINE

## 2020-07-20 PROCEDURE — 1101F PT FALLS ASSESS-DOCD LE1/YR: CPT | Mod: S$GLB,,, | Performed by: FAMILY MEDICINE

## 2020-07-20 PROCEDURE — 3078F PR MOST RECENT DIASTOLIC BLOOD PRESSURE < 80 MM HG: ICD-10-PCS | Mod: S$GLB,,, | Performed by: FAMILY MEDICINE

## 2020-07-20 PROCEDURE — 3074F PR MOST RECENT SYSTOLIC BLOOD PRESSURE < 130 MM HG: ICD-10-PCS | Mod: S$GLB,,, | Performed by: FAMILY MEDICINE

## 2020-07-20 RX ORDER — DONEPEZIL HYDROCHLORIDE 5 MG/1
5 TABLET, FILM COATED ORAL NIGHTLY
Qty: 30 TABLET | Refills: 5 | Status: SHIPPED | OUTPATIENT
Start: 2020-07-20 | End: 2020-11-13 | Stop reason: SDUPTHER

## 2020-07-20 RX ORDER — DILTIAZEM HYDROCHLORIDE 360 MG/1
360 CAPSULE, EXTENDED RELEASE ORAL DAILY
Qty: 90 CAPSULE | Refills: 1 | Status: SHIPPED | OUTPATIENT
Start: 2020-07-20 | End: 2020-11-13 | Stop reason: SDUPTHER

## 2020-07-20 RX ORDER — ROSUVASTATIN CALCIUM 10 MG/1
10 TABLET, COATED ORAL DAILY
Qty: 90 TABLET | Refills: 3 | Status: SHIPPED | OUTPATIENT
Start: 2020-07-20 | End: 2021-03-16 | Stop reason: SDUPTHER

## 2020-07-20 RX ORDER — LEVOTHYROXINE SODIUM 88 UG/1
88 TABLET ORAL
Qty: 90 TABLET | Refills: 1 | Status: SHIPPED | OUTPATIENT
Start: 2020-07-20 | End: 2020-09-10 | Stop reason: SDUPTHER

## 2020-07-20 RX ORDER — OMEPRAZOLE 20 MG/1
20 CAPSULE, DELAYED RELEASE ORAL DAILY
Qty: 90 CAPSULE | Refills: 1 | Status: SHIPPED | OUTPATIENT
Start: 2020-07-20 | End: 2020-09-10 | Stop reason: SDUPTHER

## 2020-07-20 RX ORDER — LOVASTATIN 20 MG/1
20 TABLET ORAL EVERY OTHER DAY
Qty: 45 TABLET | Refills: 1 | Status: SHIPPED | OUTPATIENT
Start: 2020-07-20 | End: 2020-07-20

## 2020-07-20 NOTE — PROGRESS NOTES
SUBJECTIVE:    Patient ID: Song Lopez Jr. is a 80 y.o. male.    Chief Complaint: Follow-up (here to discuss multiple things with Dr. Kovacs // brought bottles marked Taking -ac )    This 80-year-old male has been isolating at home for the COVID virus crisis.  He will occasionally leave the house go visit relatives.  He is not exercising or walking as much as he used to.  The wife notes that he is gait is slow down quite a bit lately.    Patient has obstructive sleep apnea and has been prescribed CPAP.  He is not tolerating in the current machine very well.  Dr. Angela Peralta has decided to lower the pressure on the machine and kidney more comfortable.    Patient is a nonsmoker times 20 years and he drinks about 3 beers per night    Patient's wife notice that patient is getting mixed up on dates and has short-term memory loss.  He gets forgetful as he is driving and occasionally drives to the wrong location.  He does not understand simple instructions at times.  In general is becoming more confused.    He has a history of polycythemia due to COPD and was followed by Dr. Lenard Clark.  He goes every other month to get a CBC checked and get a phlebotomy if his hematocrit is too thick.  He states that he has not had to have any phlebotomy in approximately 1 year.      Lab Visit on 07/11/2020   Component Date Value Ref Range Status    SARS-CoV2 (COVID-19) Qualitative P* 07/11/2020 Not Detected  Not Detected Final   Lab Visit on 05/26/2020   Component Date Value Ref Range Status    SARS-CoV2 (COVID-19) Qualitative P* 05/26/2020 Not Detected  Not Detected Final       Past Medical History:   Diagnosis Date    Chronic gout     Hypertension     TERI (obstructive sleep apnea)     Polycythemia     Squamous cell carcinoma     Thyroid disease      Past Surgical History:   Procedure Laterality Date    CAROTID ARTERY ANGIOPLASTY  right    carotid endarderectomy      CATARACT EXTRACTION, BILATERAL      CATARACT  EXTRACTION, BILATERAL      left wrist surgery       No family history on file.    Marital Status:   Alcohol History:  reports current alcohol use.  Tobacco History:  reports that he has quit smoking. His smoking use included cigarettes. He has a 25.00 pack-year smoking history. He has never used smokeless tobacco.  Drug History:  reports no history of drug use.    Review of patient's allergies indicates:   Allergen Reactions    Doxycycline     Penicillins Other (See Comments)     Skin turns red or appears sunburned       Current Outpatient Medications:     aspirin (ECOTRIN) 81 MG EC tablet, Take 81 mg by mouth once daily., Disp: , Rfl:     brimonidine 0.15 % OPTH DROP (ALPHAGAN) 0.15 % ophthalmic solution, , Disp: , Rfl:     diltiaZEM (TIAZAC) 360 MG Cs24, Take 1 capsule (360 mg total) by mouth once daily., Disp: 90 capsule, Rfl: 1    latanoprost 0.005 % ophthalmic solution, Place 1 drop into both eyes every evening., Disp: , Rfl:     levothyroxine (SYNTHROID) 88 MCG tablet, Take 1 tablet (88 mcg total) by mouth before breakfast., Disp: 90 tablet, Rfl: 1    lovastatin (MEVACOR) 20 MG tablet, Take 1 tablet (20 mg total) by mouth every other day., Disp: 45 tablet, Rfl: 1    omeprazole (PRILOSEC) 20 MG capsule, Take 1 capsule (20 mg total) by mouth once daily., Disp: 90 capsule, Rfl: 1    allopurinol (ZYLOPRIM) 100 MG tablet, Take 1 tablet (100 mg total) by mouth once daily., Disp: 90 tablet, Rfl: 1    cyanocobalamin (VITAMIN B-12) 500 MCG tablet, Take 500 mcg by mouth once daily., Disp: , Rfl:     donepeziL (ARICEPT) 5 MG tablet, Take 1 tablet (5 mg total) by mouth every evening., Disp: 30 tablet, Rfl: 5    rosuvastatin (CRESTOR) 10 MG tablet, Take 1 tablet (10 mg total) by mouth once daily., Disp: 90 tablet, Rfl: 3    Review of Systems   Constitutional: Negative for appetite change, chills, fatigue, fever and unexpected weight change.   HENT: Negative for congestion, ear pain and trouble  "swallowing.    Eyes: Negative for pain, discharge and visual disturbance.   Respiratory: Positive for shortness of breath. Negative for apnea, cough and wheezing.         Hasnt had  CPAP equip set up right yet   Cardiovascular: Negative for chest pain and leg swelling.   Gastrointestinal: Positive for anal bleeding (Hemorrhoids were bleeding the other night after straining to get a stool out). Negative for abdominal pain, blood in stool, constipation (miralax helps), diarrhea, nausea and vomiting.   Endocrine: Negative for cold intolerance, heat intolerance and polydipsia.   Genitourinary: Negative for dysuria, hematuria, testicular pain and urgency.        Nocturia  2-3  X  night   Musculoskeletal: Negative for gait problem, joint swelling and myalgias.        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   Hematological: Bruises/bleeds easily.   Psychiatric/Behavioral: Positive for confusion (Poor short-term memory). Negative for agitation, behavioral problems and hallucinations. The patient is not nervous/anxious.           Objective:      Vitals:    07/20/20 0912   BP: (!) 112/58   Pulse: 60   Weight: 72.6 kg (160 lb)   Height: 5' 7" (1.702 m)     Body mass index is 25.06 kg/m².  Physical Exam      Assessment:       1. MCI (mild cognitive impairment)    2. Gastro-esophageal reflux disease without esophagitis    3. Mixed hyperlipidemia    4. Hypothyroidism, unspecified type    5. Essential hypertension    6. Pulmonary fibrosis, unspecified    7. Atherosclerosis of both carotid arteries    8. Enlarged prostate without lower urinary tract symptoms (luts)    9. Polycythemia, secondary    10. Dependence on other enabling machines and devices    11. TERI on CPAP    12. Gout, unspecified cause, unspecified chronicity, unspecified site    13. Osteoarthritis, unspecified osteoarthritis type, unspecified site       "   Plan:       MCI (mild cognitive impairment)  -     donepeziL (ARICEPT) 5 MG tablet; Take 1 tablet (5 mg total) by mouth every evening.  Dispense: 30 tablet; Refill: 5  Patient had a mini mental status exam today.  He scored 26/30 points indicating mild cognitive impairment.  He agrees to try donepezil 5 mg nightly, RTC 3 months to re-evaluate  Gastro-esophageal reflux disease without esophagitis  -     omeprazole (PRILOSEC) 20 MG capsule; Take 1 capsule (20 mg total) by mouth once daily.  Dispense: 90 capsule; Refill: 1  Continue omeprazole 20 mg  Mixed hyperlipidemia  -     rosuvastatin (CRESTOR) 10 MG tablet; Take 1 tablet (10 mg total) by mouth once daily.  Dispense: 90 tablet; Refill: 3  -     lovastatin (MEVACOR) 20 MG tablet; Take 1 tablet (20 mg total) by mouth every other day.  Dispense: 45 tablet; Refill: 1  -     Comprehensive metabolic panel; Future; Expected date: 07/20/2020  -     Lipid Panel; Future; Expected date: 07/20/2020  Discontinue lovastatin and continue rosuvastatin, due for lab work today  Hypothyroidism, unspecified type  -     levothyroxine (SYNTHROID) 88 MCG tablet; Take 1 tablet (88 mcg total) by mouth before breakfast.  Dispense: 90 tablet; Refill: 1  -     TSH w/reflex to FT4; Future; Expected date: 07/20/2020  Continue thyroid  Essential hypertension  -     diltiaZEM (TIAZAC) 360 MG Cs24; Take 1 capsule (360 mg total) by mouth once daily.  Dispense: 90 capsule; Refill: 1  Blood pressure 112/58 well-controlled  Pulmonary fibrosis, unspecified    Atherosclerosis of both carotid arteries    Enlarged prostate without lower urinary tract symptoms (luts)    Polycythemia, secondary  Continue every other month CBC with Dr. Lenard Clark  Dependence on other enabling machines and devices  Encourage patient to use CPAP machine once the pressure gradient has been lowered.  TERI on CPAP    Gout, unspecified cause, unspecified chronicity, unspecified site    Osteoarthritis, unspecified  osteoarthritis type, unspecified site      Follow up in about 3 months (around 10/20/2020) for Elizabeth - memory.

## 2020-07-21 LAB
ALBUMIN SERPL-MCNC: 4.5 G/DL (ref 3.6–5.1)
ALBUMIN/GLOB SERPL: 2 (CALC) (ref 1–2.5)
ALP SERPL-CCNC: 62 U/L (ref 35–144)
ALT SERPL-CCNC: 11 U/L (ref 9–46)
AST SERPL-CCNC: 16 U/L (ref 10–35)
BILIRUB SERPL-MCNC: 0.7 MG/DL (ref 0.2–1.2)
BUN SERPL-MCNC: 15 MG/DL (ref 7–25)
BUN/CREAT SERPL: 11 (CALC) (ref 6–22)
CALCIUM SERPL-MCNC: 9.6 MG/DL (ref 8.6–10.3)
CHLORIDE SERPL-SCNC: 98 MMOL/L (ref 98–110)
CHOLEST SERPL-MCNC: 125 MG/DL
CHOLEST/HDLC SERPL: 3.7 (CALC)
CO2 SERPL-SCNC: 26 MMOL/L (ref 20–32)
CREAT SERPL-MCNC: 1.39 MG/DL (ref 0.7–1.18)
GFRSERPLBLD MDRD-ARVRAT: 48 ML/MIN/1.73M2
GLOBULIN SER CALC-MCNC: 2.2 G/DL (CALC) (ref 1.9–3.7)
GLUCOSE SERPL-MCNC: 96 MG/DL (ref 65–139)
HDLC SERPL-MCNC: 34 MG/DL
LDLC SERPL CALC-MCNC: 73 MG/DL (CALC)
NONHDLC SERPL-MCNC: 91 MG/DL (CALC)
POTASSIUM SERPL-SCNC: 5.4 MMOL/L (ref 3.5–5.3)
PROT SERPL-MCNC: 6.7 G/DL (ref 6.1–8.1)
SODIUM SERPL-SCNC: 133 MMOL/L (ref 135–146)
TRIGL SERPL-MCNC: 96 MG/DL
TSH SERPL-ACNC: 0.77 MIU/L (ref 0.4–4.5)

## 2020-07-30 ENCOUNTER — TELEPHONE (OUTPATIENT)
Dept: FAMILY MEDICINE | Facility: CLINIC | Age: 80
End: 2020-07-30

## 2020-07-30 NOTE — TELEPHONE ENCOUNTER
----- Message from Jake Kovacs MD sent at 7/29/2020  8:12 PM CDT -----  Call patient.  Blood sugar looks normal.  Kidneys look a little weak, he needs to drink more water during the day.  Cholesterol excellent at 125.  Thyroid levels are normal.  Continue current medications.  Recheck BMP in 3 months.

## 2020-08-03 ENCOUNTER — TELEPHONE (OUTPATIENT)
Dept: FAMILY MEDICINE | Facility: CLINIC | Age: 80
End: 2020-08-03

## 2020-08-03 ENCOUNTER — CLINICAL SUPPORT (OUTPATIENT)
Dept: FAMILY MEDICINE | Facility: CLINIC | Age: 80
End: 2020-08-03
Payer: MEDICARE

## 2020-08-03 DIAGNOSIS — R63.4 WEIGHT LOSS: Primary | ICD-10-CM

## 2020-08-03 NOTE — PROGRESS NOTES
Pt here in office for a weight check [place] per Dr. Kovacs.  pts weight 155lbs   last weight on 7/20 160lbs

## 2020-08-03 NOTE — TELEPHONE ENCOUNTER
----- Message from Bernie Almeida sent at 8/3/2020 11:41 AM CDT -----  The patient has been losing weight and does not know why. February 13th he saw Elizabeth he weighted 167. He saw Dr. Kovacs July 20th he weighted 160. This morning he weighted 145 with no clothes on. Pt's # [phone]

## 2020-08-27 RX ORDER — TAMSULOSIN HYDROCHLORIDE 0.4 MG/1
0.4 CAPSULE ORAL DAILY
Qty: 30 CAPSULE | Refills: 1 | Status: SHIPPED | OUTPATIENT
Start: 2020-08-27 | End: 2020-09-10

## 2020-08-27 NOTE — TELEPHONE ENCOUNTER
----- Message from Bernie Almeida sent at 8/27/2020 10:39 AM CDT -----  Please give this message to Dr. Kovacs. The patient can not hold his urine. When he walks to the bathroom his urine starts dribbling out. This has happen several times. [facility] on [address]. Pt's # [phone]

## 2020-08-27 NOTE — TELEPHONE ENCOUNTER
"Per Dr Kovacs, "Tamsulosin .4 1 po q day #30 1 refill" for bladder. LMOR for pts wife letting her know we are sending in a daily medication for him and to call back for more information.  "

## 2020-09-09 ENCOUNTER — TELEPHONE (OUTPATIENT)
Dept: FAMILY MEDICINE | Facility: CLINIC | Age: 80
End: 2020-09-09

## 2020-09-09 NOTE — TELEPHONE ENCOUNTER
Please call patient and see if he can check his blood pressure and heart rate at home.  Also clarify the dizziness symptoms.  Is he having like a spinning type sensation, or symptoms worse with head movement or position changes.  Any nausea/vomiting or speech/vision changes? Has he ever had vertigo before?

## 2020-09-09 NOTE — TELEPHONE ENCOUNTER
----- Message from Bernie Almeida sent at 9/9/2020 11:30 AM CDT -----  The patient was put on Tamsulosin  the end of August. He stopped taking it last week because he was getting nauseated and feeling dizzy. Today he is feeling dizzy. He is having more and more spells lately and not to stable on his feet. He has an apt with Dr. Kovacs tomorrow. Can he wait or should he be seen today. He wants to see Arin or Dr. Kovacs. Pt's # [phone]

## 2020-09-09 NOTE — TELEPHONE ENCOUNTER
Spoke with pt, doesn't really feel dizzy. He said he feels more foggy like he doesn't feel right. No nausea/vomiting or vision changes. Pt BP is 143/87 P 57 Pt will keep appt tomorrow with .

## 2020-09-10 ENCOUNTER — OFFICE VISIT (OUTPATIENT)
Dept: FAMILY MEDICINE | Facility: CLINIC | Age: 80
End: 2020-09-10
Payer: MEDICARE

## 2020-09-10 VITALS
WEIGHT: 155 LBS | HEART RATE: 64 BPM | HEIGHT: 67 IN | TEMPERATURE: 98 F | BODY MASS INDEX: 24.33 KG/M2 | SYSTOLIC BLOOD PRESSURE: 100 MMHG | DIASTOLIC BLOOD PRESSURE: 50 MMHG

## 2020-09-10 DIAGNOSIS — M1A.0790 IDIOPATHIC CHRONIC GOUT OF FOOT WITHOUT TOPHUS, UNSPECIFIED LATERALITY: ICD-10-CM

## 2020-09-10 DIAGNOSIS — R63.4 WEIGHT LOSS: Primary | ICD-10-CM

## 2020-09-10 DIAGNOSIS — Z87.891 FORMER SMOKER: ICD-10-CM

## 2020-09-10 DIAGNOSIS — G47.33 OSA ON CPAP: ICD-10-CM

## 2020-09-10 DIAGNOSIS — G31.84 MCI (MILD COGNITIVE IMPAIRMENT): ICD-10-CM

## 2020-09-10 DIAGNOSIS — Z99.89 DEPENDENCE ON OTHER ENABLING MACHINES AND DEVICES: ICD-10-CM

## 2020-09-10 DIAGNOSIS — E78.2 MIXED HYPERLIPIDEMIA: ICD-10-CM

## 2020-09-10 DIAGNOSIS — N40.0 ENLARGED PROSTATE WITHOUT LOWER URINARY TRACT SYMPTOMS (LUTS): ICD-10-CM

## 2020-09-10 DIAGNOSIS — E03.9 HYPOTHYROIDISM, UNSPECIFIED TYPE: ICD-10-CM

## 2020-09-10 DIAGNOSIS — K21.9 GASTRO-ESOPHAGEAL REFLUX DISEASE WITHOUT ESOPHAGITIS: ICD-10-CM

## 2020-09-10 DIAGNOSIS — J84.10 PULMONARY FIBROSIS, UNSPECIFIED: ICD-10-CM

## 2020-09-10 DIAGNOSIS — I10 ESSENTIAL HYPERTENSION: ICD-10-CM

## 2020-09-10 PROCEDURE — 1101F PT FALLS ASSESS-DOCD LE1/YR: CPT | Mod: S$GLB,,, | Performed by: FAMILY MEDICINE

## 2020-09-10 PROCEDURE — 1159F MED LIST DOCD IN RCRD: CPT | Mod: S$GLB,,, | Performed by: FAMILY MEDICINE

## 2020-09-10 PROCEDURE — 99214 OFFICE O/P EST MOD 30 MIN: CPT | Mod: S$GLB,,, | Performed by: FAMILY MEDICINE

## 2020-09-10 PROCEDURE — 3074F SYST BP LT 130 MM HG: CPT | Mod: S$GLB,,, | Performed by: FAMILY MEDICINE

## 2020-09-10 PROCEDURE — 1159F PR MEDICATION LIST DOCUMENTED IN MEDICAL RECORD: ICD-10-PCS | Mod: S$GLB,,, | Performed by: FAMILY MEDICINE

## 2020-09-10 PROCEDURE — 1101F PR PT FALLS ASSESS DOC 0-1 FALLS W/OUT INJ PAST YR: ICD-10-PCS | Mod: S$GLB,,, | Performed by: FAMILY MEDICINE

## 2020-09-10 PROCEDURE — 3078F PR MOST RECENT DIASTOLIC BLOOD PRESSURE < 80 MM HG: ICD-10-PCS | Mod: S$GLB,,, | Performed by: FAMILY MEDICINE

## 2020-09-10 PROCEDURE — 99214 PR OFFICE/OUTPT VISIT, EST, LEVL IV, 30-39 MIN: ICD-10-PCS | Mod: S$GLB,,, | Performed by: FAMILY MEDICINE

## 2020-09-10 PROCEDURE — 3078F DIAST BP <80 MM HG: CPT | Mod: S$GLB,,, | Performed by: FAMILY MEDICINE

## 2020-09-10 PROCEDURE — 3074F PR MOST RECENT SYSTOLIC BLOOD PRESSURE < 130 MM HG: ICD-10-PCS | Mod: S$GLB,,, | Performed by: FAMILY MEDICINE

## 2020-09-10 RX ORDER — OMEPRAZOLE 20 MG/1
20 CAPSULE, DELAYED RELEASE ORAL DAILY
Qty: 90 CAPSULE | Refills: 1 | Status: SHIPPED | OUTPATIENT
Start: 2020-09-10 | End: 2021-02-08 | Stop reason: SDUPTHER

## 2020-09-10 RX ORDER — ALLOPURINOL 100 MG/1
100 TABLET ORAL DAILY
Qty: 90 TABLET | Refills: 1 | Status: SHIPPED | OUTPATIENT
Start: 2020-09-10 | End: 2021-02-08 | Stop reason: SDUPTHER

## 2020-09-10 RX ORDER — BRIMONIDINE TARTRATE 2 MG/ML
1 SOLUTION/ DROPS OPHTHALMIC 3 TIMES DAILY
COMMUNITY
Start: 2020-08-06

## 2020-09-10 RX ORDER — LEVOTHYROXINE SODIUM 88 UG/1
88 TABLET ORAL
Qty: 90 TABLET | Refills: 1 | Status: SHIPPED | OUTPATIENT
Start: 2020-09-10 | End: 2021-02-08 | Stop reason: SDUPTHER

## 2020-09-10 NOTE — PROGRESS NOTES
SUBJECTIVE:    Patient ID: Song Lopez Jr. is a 80 y.o. male.    Chief Complaint: Dizziness (brought bottles ac)    This 80-year-old male feels that his balance is getting 4.  He feels woozy at times.  Could not sleep at all last night before this office visit.  He has TERI and wears CPAP only half of the night.  He forgets to put it back on later in the night.  Wife states that he snores a lot when he is not on CPAP.  Patient does fall asleep while reading a newspaper.    He continues to lose weight he has lost 5 more lb.  He states he has a good appetite but only eats 2 meals a day and snacks in between meals.  He has reduced his beer intake down to 3 beers per day.    Since the COVID virus crisis when patient has been isolating at home he is been very listless and not going up to visit with his friends.  He does not have any goals or chores to do; he does not have any projects to work on.      Office Visit on 07/20/2020   Component Date Value Ref Range Status    Glucose 07/20/2020 96  65 - 139 mg/dL Final    BUN, Bld 07/20/2020 15  7 - 25 mg/dL Final    Creatinine 07/20/2020 1.39* 0.70 - 1.18 mg/dL Final    eGFR if non African American 07/20/2020 48* > OR = 60 mL/min/1.73m2 Final    eGFR if  07/20/2020 55* > OR = 60 mL/min/1.73m2 Final    BUN/Creatinine Ratio 07/20/2020 11  6 - 22 (calc) Final    Sodium 07/20/2020 133* 135 - 146 mmol/L Final    Potassium 07/20/2020 5.4* 3.5 - 5.3 mmol/L Final    Chloride 07/20/2020 98  98 - 110 mmol/L Final    CO2 07/20/2020 26  20 - 32 mmol/L Final    Calcium 07/20/2020 9.6  8.6 - 10.3 mg/dL Final    Total Protein 07/20/2020 6.7  6.1 - 8.1 g/dL Final    Albumin 07/20/2020 4.5  3.6 - 5.1 g/dL Final    Globulin, Total 07/20/2020 2.2  1.9 - 3.7 g/dL (calc) Final    Albumin/Globulin Ratio 07/20/2020 2.0  1.0 - 2.5 (calc) Final    Total Bilirubin 07/20/2020 0.7  0.2 - 1.2 mg/dL Final    Alkaline Phosphatase 07/20/2020 62  35 - 144 U/L Final     AST 07/20/2020 16  10 - 35 U/L Final    ALT 07/20/2020 11  9 - 46 U/L Final    Cholesterol 07/20/2020 125  <200 mg/dL Final    HDL 07/20/2020 34* > OR = 40 mg/dL Final    Triglycerides 07/20/2020 96  <150 mg/dL Final    LDL Cholesterol 07/20/2020 73  mg/dL (calc) Final    Hdl/Cholesterol Ratio 07/20/2020 3.7  <5.0 (calc) Final    Non HDL Chol. (LDL+VLDL) 07/20/2020 91  <130 mg/dL (calc) Final    TSH w/reflex to FT4 07/20/2020 0.77  0.40 - 4.50 mIU/L Final   Lab Visit on 07/11/2020   Component Date Value Ref Range Status    SARS-CoV2 (COVID-19) Qualitative P* 07/11/2020 Not Detected  Not Detected Final   Lab Visit on 05/26/2020   Component Date Value Ref Range Status    SARS-CoV2 (COVID-19) Qualitative P* 05/26/2020 Not Detected  Not Detected Final       Past Medical History:   Diagnosis Date    Chronic gout     Hypertension     TERI (obstructive sleep apnea)     Polycythemia     Squamous cell carcinoma     Thyroid disease      Social History     Socioeconomic History    Marital status:      Spouse name: Not on file    Number of children: Not on file    Years of education: Not on file    Highest education level: Not on file   Occupational History    Not on file   Social Needs    Financial resource strain: Not on file    Food insecurity     Worry: Not on file     Inability: Not on file    Transportation needs     Medical: Not on file     Non-medical: Not on file   Tobacco Use    Smoking status: Former Smoker     Packs/day: 1.00     Years: 25.00     Pack years: 25.00     Types: Cigarettes    Smokeless tobacco: Never Used   Substance and Sexual Activity    Alcohol use: Yes     Comment: 6 per day    Drug use: No    Sexual activity: Not on file   Lifestyle    Physical activity     Days per week: Not on file     Minutes per session: Not on file    Stress: Not on file   Relationships    Social connections     Talks on phone: Not on file     Gets together: Not on file     Attends  M[employer]on service: Not on file     Active member of club or organization: Not on file     Attends meetings of clubs or organizations: Not on file     Relationship status: Not on file   Other Topics Concern    Not on file   Social History Narrative    Not on file     Past Surgical History:   Procedure Laterality Date    CAROTID ARTERY ANGIOPLASTY  right    carotid endarderectomy      CATARACT EXTRACTION, BILATERAL      CATARACT EXTRACTION, BILATERAL      left wrist surgery       No family history on file.    Review of patient's allergies indicates:   Allergen Reactions    Doxycycline     Penicillins Other (See Comments)     Skin turns red or appears sunburned       Current Outpatient Medications:     allopurinoL (ZYLOPRIM) 100 MG tablet, Take 1 tablet (100 mg total) by mouth once daily., Disp: 90 tablet, Rfl: 1    aspirin (ECOTRIN) 81 MG EC tablet, Take 81 mg by mouth once daily., Disp: , Rfl:     cyanocobalamin (VITAMIN B-12) 500 MCG tablet, Take 500 mcg by mouth once daily., Disp: , Rfl:     diltiaZEM (TIAZAC) 360 MG Cs24, Take 1 capsule (360 mg total) by mouth once daily., Disp: 90 capsule, Rfl: 1    donepeziL (ARICEPT) 5 MG tablet, Take 1 tablet (5 mg total) by mouth every evening., Disp: 30 tablet, Rfl: 5    levothyroxine (SYNTHROID) 88 MCG tablet, Take 1 tablet (88 mcg total) by mouth before breakfast., Disp: 90 tablet, Rfl: 1    omeprazole (PRILOSEC) 20 MG capsule, Take 1 capsule (20 mg total) by mouth once daily., Disp: 90 capsule, Rfl: 1    rosuvastatin (CRESTOR) 10 MG tablet, Take 1 tablet (10 mg total) by mouth once daily., Disp: 90 tablet, Rfl: 3    brimonidine 0.2% (ALPHAGAN) 0.2 % Drop, INSTILL 1 DROP INTO EACH EYE TWICE DAILY FOR 30 DAYS, Disp: , Rfl:     latanoprost 0.005 % ophthalmic solution, Place 1 drop into both eyes every evening., Disp: , Rfl:     Review of Systems   Constitutional: Positive for appetite change (He has 2 meals per day). Negative for chills, fatigue, fever and  "unexpected weight change.   HENT: Negative for congestion, ear pain and trouble swallowing.    Eyes: Negative for pain, discharge and visual disturbance.   Respiratory: Negative for apnea, cough, shortness of breath and wheezing.         TERI but not that compliant with his CPAP at night   Cardiovascular: Negative for chest pain and leg swelling.   Gastrointestinal: Negative for abdominal pain, blood in stool, constipation, diarrhea, nausea and vomiting.   Endocrine: Negative for cold intolerance, heat intolerance and polydipsia.   Genitourinary: Negative for dysuria, hematuria, testicular pain and urgency.   Musculoskeletal: Negative for gait problem, joint swelling and myalgias.   Neurological: Positive for dizziness. Negative for seizures and numbness.        He feels his balance is getting poor   Psychiatric/Behavioral: Negative for agitation, behavioral problems and hallucinations. The patient is not nervous/anxious.           Objective:      Vitals:    09/10/20 0828   BP: (!) 100/50   Pulse: 64   Temp: 97.6 °F (36.4 °C)   Weight: 70.3 kg (155 lb)   Height: 5' 7" (1.702 m)     Physical Exam  Vitals signs and nursing note reviewed.   Constitutional:       Appearance: He is well-developed.      Comments: Thin appearing white male in no apparent distress   HENT:      Head: Normocephalic and atraumatic.      Right Ear: External ear normal.      Left Ear: External ear normal.      Nose: Nose normal.   Eyes:      Pupils: Pupils are equal, round, and reactive to light.   Neck:      Musculoskeletal: Normal range of motion and neck supple.      Thyroid: No thyromegaly.      Vascular: No carotid bruit.   Cardiovascular:      Rate and Rhythm: Normal rate and regular rhythm.      Heart sounds: Normal heart sounds. No murmur.   Pulmonary:      Effort: Pulmonary effort is normal.      Breath sounds: Normal breath sounds. No wheezing or rales.   Abdominal:      General: Bowel sounds are normal. There is no distension.      " Palpations: Abdomen is soft.      Tenderness: There is no abdominal tenderness.   Musculoskeletal: Normal range of motion.         General: No tenderness or deformity.      Lumbar back: Normal. He exhibits no pain and no spasm.      Comments: Bends 90 degrees at  waist, shoulders and knees have good range of motion.   Lymphadenopathy:      Cervical: No cervical adenopathy.   Skin:     General: Skin is warm and dry.      Findings: No rash.   Neurological:      Mental Status: He is alert and oriented to person, place, and time.      Cranial Nerves: No cranial nerve deficit.      Coordination: Coordination normal.   Psychiatric:         Behavior: Behavior normal.         Thought Content: Thought content normal.         Judgment: Judgment normal.      Comments: Mood appears flat and depressed.           Assessment:       1. Weight loss    2. Idiopathic chronic gout of foot without tophus, unspecified laterality    3. Gastro-esophageal reflux disease without esophagitis    4. Hypothyroidism, unspecified type    5. MCI (mild cognitive impairment)    6. Pulmonary fibrosis, unspecified    7. Essential hypertension    8. Mixed hyperlipidemia    9. Enlarged prostate without lower urinary tract symptoms (luts)    10. TERI on CPAP    11. Dependence on other enabling machines and devices    12. Former smoker         Plan:       Weight loss  -     X-Ray Chest PA And Lateral; Future; Expected date: 09/10/2020  Due to weight loss that is unexplained will get chest x-ray to rule out malignancy  Idiopathic chronic gout of foot without tophus, unspecified laterality  -     allopurinoL (ZYLOPRIM) 100 MG tablet; Take 1 tablet (100 mg total) by mouth once daily.  Dispense: 90 tablet; Refill: 1  Refill allopurinol  Gastro-esophageal reflux disease without esophagitis  -     omeprazole (PRILOSEC) 20 MG capsule; Take 1 capsule (20 mg total) by mouth once daily.  Dispense: 90 capsule; Refill: 1  Continue omeprazole  Hypothyroidism,  unspecified type  -     levothyroxine (SYNTHROID) 88 MCG tablet; Take 1 tablet (88 mcg total) by mouth before breakfast.  Dispense: 90 tablet; Refill: 1    MCI (mild cognitive impairment)    Pulmonary fibrosis, unspecified    Essential hypertension    Mixed hyperlipidemia    Enlarged prostate without lower urinary tract symptoms (luts)    TERI on CPAP  Encouraged better compliance with his CPAP at night  Dependence on other enabling machines and devices    Former smoker  Patient may be getting weaker due to deconditioning.  He may have a mild case of depression due to the all the isolation from this COVID virus.  Patient encouraged to find some projects her activities.  He needs to get some resistance weights and start building his muscles back up.  And exercising.  RTC 2 months    Follow up in about 2 months (around 11/10/2020).        9/10/2020 Jake Kovacs

## 2020-09-14 ENCOUNTER — TELEPHONE (OUTPATIENT)
Dept: FAMILY MEDICINE | Facility: CLINIC | Age: 80
End: 2020-09-14

## 2020-09-14 DIAGNOSIS — R93.89 ABNORMAL CHEST X-RAY: Primary | ICD-10-CM

## 2020-09-16 ENCOUNTER — CLINICAL SUPPORT (OUTPATIENT)
Dept: FAMILY MEDICINE | Facility: CLINIC | Age: 80
End: 2020-09-16
Payer: MEDICARE

## 2020-09-16 DIAGNOSIS — Z23 NEED FOR IMMUNIZATION AGAINST INFLUENZA: Primary | ICD-10-CM

## 2020-09-16 PROCEDURE — 90662 IIV NO PRSV INCREASED AG IM: CPT | Mod: S$GLB,,, | Performed by: FAMILY MEDICINE

## 2020-09-16 PROCEDURE — G0008 FLU VACCINE - QUADRIVALENT - HIGH DOSE (65+) PRESERVATIVE FREE IM: ICD-10-PCS | Mod: S$GLB,,, | Performed by: FAMILY MEDICINE

## 2020-09-16 PROCEDURE — G0008 ADMIN INFLUENZA VIRUS VAC: HCPCS | Mod: S$GLB,,, | Performed by: FAMILY MEDICINE

## 2020-09-16 PROCEDURE — 90662 FLU VACCINE - QUADRIVALENT - HIGH DOSE (65+) PRESERVATIVE FREE IM: ICD-10-PCS | Mod: S$GLB,,, | Performed by: FAMILY MEDICINE

## 2020-09-18 ENCOUNTER — HOSPITAL ENCOUNTER (OUTPATIENT)
Dept: RADIOLOGY | Facility: HOSPITAL | Age: 80
Discharge: HOME | End: 2020-09-18
Attending: FAMILY MEDICINE
Payer: MEDICARE

## 2020-09-18 DIAGNOSIS — R93.89 ABNORMAL CHEST X-RAY: ICD-10-CM

## 2020-09-18 PROCEDURE — 71250 CT THORAX DX C-: CPT | Mod: TC

## 2020-09-21 ENCOUNTER — TELEPHONE (OUTPATIENT)
Dept: FAMILY MEDICINE | Facility: CLINIC | Age: 80
End: 2020-09-21

## 2020-09-21 NOTE — TELEPHONE ENCOUNTER
Gave results to patient who did not try to understand but I see that I need to tell his wife, Em, these results/ba

## 2020-09-21 NOTE — TELEPHONE ENCOUNTER
----- Message from Jake Kovacs MD sent at 9/19/2020  4:48 PM CDT -----  Call patient's wife.  Patient's CT scan of the lungs shows scarring that is advancing in the right upper lobe and left lung too.  No lung cancer or tumor seen.  He may have an interstitial lung disease a causes scarring.  We recommend getting back to see Dr. Peralta or  Araceli Keller NP pulmonologist for further recommendations

## 2020-09-23 ENCOUNTER — OFFICE VISIT (OUTPATIENT)
Dept: PULMONOLOGY | Facility: CLINIC | Age: 80
End: 2020-09-23
Payer: MEDICARE

## 2020-09-23 VITALS
DIASTOLIC BLOOD PRESSURE: 51 MMHG | HEIGHT: 64 IN | TEMPERATURE: 97 F | BODY MASS INDEX: 26.12 KG/M2 | SYSTOLIC BLOOD PRESSURE: 112 MMHG | OXYGEN SATURATION: 98 % | HEART RATE: 63 BPM | WEIGHT: 153 LBS

## 2020-09-23 DIAGNOSIS — J84.10 PULMONARY FIBROSIS, UNSPECIFIED: ICD-10-CM

## 2020-09-23 DIAGNOSIS — G47.33 OSA (OBSTRUCTIVE SLEEP APNEA): Primary | ICD-10-CM

## 2020-09-23 PROCEDURE — 1101F PR PT FALLS ASSESS DOC 0-1 FALLS W/OUT INJ PAST YR: ICD-10-PCS | Mod: S$GLB,,, | Performed by: INTERNAL MEDICINE

## 2020-09-23 PROCEDURE — 99214 PR OFFICE/OUTPT VISIT, EST, LEVL IV, 30-39 MIN: ICD-10-PCS | Mod: S$GLB,,, | Performed by: INTERNAL MEDICINE

## 2020-09-23 PROCEDURE — 1126F PR PAIN SEVERITY QUANTIFIED, NO PAIN PRESENT: ICD-10-PCS | Mod: S$GLB,,, | Performed by: INTERNAL MEDICINE

## 2020-09-23 PROCEDURE — 3074F SYST BP LT 130 MM HG: CPT | Mod: S$GLB,,, | Performed by: INTERNAL MEDICINE

## 2020-09-23 PROCEDURE — 3074F PR MOST RECENT SYSTOLIC BLOOD PRESSURE < 130 MM HG: ICD-10-PCS | Mod: S$GLB,,, | Performed by: INTERNAL MEDICINE

## 2020-09-23 PROCEDURE — 3078F PR MOST RECENT DIASTOLIC BLOOD PRESSURE < 80 MM HG: ICD-10-PCS | Mod: S$GLB,,, | Performed by: INTERNAL MEDICINE

## 2020-09-23 PROCEDURE — 1126F AMNT PAIN NOTED NONE PRSNT: CPT | Mod: S$GLB,,, | Performed by: INTERNAL MEDICINE

## 2020-09-23 PROCEDURE — 1159F MED LIST DOCD IN RCRD: CPT | Mod: S$GLB,,, | Performed by: INTERNAL MEDICINE

## 2020-09-23 PROCEDURE — 99214 OFFICE O/P EST MOD 30 MIN: CPT | Mod: S$GLB,,, | Performed by: INTERNAL MEDICINE

## 2020-09-23 PROCEDURE — 1159F PR MEDICATION LIST DOCUMENTED IN MEDICAL RECORD: ICD-10-PCS | Mod: S$GLB,,, | Performed by: INTERNAL MEDICINE

## 2020-09-23 PROCEDURE — 3078F DIAST BP <80 MM HG: CPT | Mod: S$GLB,,, | Performed by: INTERNAL MEDICINE

## 2020-09-23 PROCEDURE — 1101F PT FALLS ASSESS-DOCD LE1/YR: CPT | Mod: S$GLB,,, | Performed by: INTERNAL MEDICINE

## 2020-09-23 NOTE — PROGRESS NOTES
SUBJECTIVE:    Patient ID: Song Lopez Jr. is a 80 y.o. male.    Chief Complaint: Abnormal Ct Scan    HPI the patient is here sleeping better with the CPAP.  He is on 6cm CPAP and is sleeping better with it.  He did not know to bring in his CPAP chip.  Will get a download from [employer].      Dr. Kovacs was concerned about his abnormal CXR.  He obtained a CT which shows the pulmonary fibrosis is stable since 2016.    Past Medical History:   Diagnosis Date    Chronic gout     Hypertension     TERI (obstructive sleep apnea)     Polycythemia     Squamous cell carcinoma     Thyroid disease      Past Surgical History:   Procedure Laterality Date    CAROTID ARTERY ANGIOPLASTY  right    carotid endarderectomy      CATARACT EXTRACTION, BILATERAL      CATARACT EXTRACTION, BILATERAL      left wrist surgery       No family history on file.     Social History:   Marital Status:   Occupation: Data Unavailable  Alcohol History:  reports current alcohol use.  Tobacco History:  reports that he has quit smoking. His smoking use included cigarettes. He has a 25.00 pack-year smoking history. He has never used smokeless tobacco.  Drug History:  reports no history of drug use.    Review of patient's allergies indicates:   Allergen Reactions    Doxycycline     Penicillins Other (See Comments)     Skin turns red or appears sunburned       Current Outpatient Medications   Medication Sig Dispense Refill    allopurinoL (ZYLOPRIM) 100 MG tablet Take 1 tablet (100 mg total) by mouth once daily. 90 tablet 1    aspirin (ECOTRIN) 81 MG EC tablet Take 81 mg by mouth once daily.      brimonidine 0.2% (ALPHAGAN) 0.2 % Drop INSTILL 1 DROP INTO EACH EYE TWICE DAILY FOR 30 DAYS      cyanocobalamin (VITAMIN B-12) 500 MCG tablet Take 500 mcg by mouth once daily.      diltiaZEM (TIAZAC) 360 MG Cs24 Take 1 capsule (360 mg total) by mouth once daily. 90 capsule 1    donepeziL (ARICEPT) 5 MG tablet Take 1 tablet (5 mg total) by  "mouth every evening. 30 tablet 5    latanoprost 0.005 % ophthalmic solution Place 1 drop into both eyes every evening.      levothyroxine (SYNTHROID) 88 MCG tablet Take 1 tablet (88 mcg total) by mouth before breakfast. 90 tablet 1    omeprazole (PRILOSEC) 20 MG capsule Take 1 capsule (20 mg total) by mouth once daily. 90 capsule 1    rosuvastatin (CRESTOR) 10 MG tablet Take 1 tablet (10 mg total) by mouth once daily. 90 tablet 3     No current facility-administered medications for this visit.        Alpha-1 Antitrypsin:  Last PFT:   Last CT:    Review of Systems  General: Feeling pretty good. Sleeping well on his CPAP.  Eyes: Vision is about the same.  ENT:  No sinusitis or pharyngitis.   Heart:: No chest pain or palpitations.  Lungs: No cough, sputum, or wheezing.  GI: No Nausea, vomiting, constipation, diarrhea, or reflux.  : Nocturia x 2  Musculoskeletal: L wrist hurts sometimes  Skin: No lesions or rashes. Bruises easily.  Neuro: No headaches or neuropathy.  Lymph: No edema or adenopathy.  Psych: No anxiety or depression.  Endo: No weight change.    OBJECTIVE:      BP (!) 112/51 (BP Location: Left arm, Patient Position: Sitting, BP Method: Small (Automatic))   Pulse 63   Temp 97 °F (36.1 °C)   Ht 5' 4" (1.626 m)   Wt 69.4 kg (153 lb)   SpO2 98%   BMI 26.26 kg/m²     Physical Exam  GENERAL: Older patient in no distress.  HEENT: Pupils equal and reactive. Extraocular movements intact. Nose intact.      Pharynx moist.  NECK: Supple.   HEART: Regular rate and rhythm. No murmur or gallop auscultated.  LUNGS: Clear to auscultation and percussion. Lung excursion symmetrical. No change in fremitus. No adventitial noises.  ABDOMEN: Bowel sounds present. Non-tender, no masses palpated.  EXTREMITIES: Normal muscle tone and joint movement, no cyanosis or clubbing.   LYMPHATICS: No adenopathy palpated, no edema.  SKIN: Dry, intact, no lesions.   NEURO: Cranial nerves II-XII intact. Motor strength 5/5 " bilaterally, upper and lower extremities.  PSYCH: Appropriate affect.    Assessment:       1. TERI (obstructive sleep apnea)    2. Pulmonary fibrosis, unspecified          Plan:       TERI (obstructive sleep apnea)    Pulmonary fibrosis, unspecified         Follow up in about 6 months (around 3/23/2021).  Continue wearing your CPAP nightly                                                                                                                                                                  Spouse

## 2020-09-23 NOTE — Clinical Note
Don't worry about his pulmonary fibrosis. It is old and stable for many years.  He does not need any more CT's

## 2020-09-28 ENCOUNTER — TELEPHONE (OUTPATIENT)
Dept: FAMILY MEDICINE | Facility: CLINIC | Age: 80
End: 2020-09-28

## 2020-10-13 ENCOUNTER — TELEPHONE (OUTPATIENT)
Dept: FAMILY MEDICINE | Facility: CLINIC | Age: 80
End: 2020-10-13

## 2020-10-13 DIAGNOSIS — Z79.899 ENCOUNTER FOR LONG-TERM (CURRENT) USE OF OTHER MEDICATIONS: ICD-10-CM

## 2020-10-13 DIAGNOSIS — R94.4 KIDNEY FUNCTION TEST ABNORMAL: Primary | ICD-10-CM

## 2020-10-13 NOTE — TELEPHONE ENCOUNTER
----- Message from Cynthia Tejada sent at 7/30/2020 10:52 AM CDT -----  Regarding: needs BMP in 3 months  Jake Kovacs MD  P Jake Kovacs Staff         Call patient.  Blood sugar looks normal.  Kidneys look a little weak, he needs to drink more water during the day.  Cholesterol excellent at 125.  Thyroid levels are normal.  Continue current medications.  Recheck BMP in 3 months.

## 2020-10-15 LAB
BUN SERPL-MCNC: 14 MG/DL (ref 7–25)
BUN/CREAT SERPL: ABNORMAL (CALC) (ref 6–22)
CALCIUM SERPL-MCNC: 9.6 MG/DL (ref 8.6–10.3)
CHLORIDE SERPL-SCNC: 98 MMOL/L (ref 98–110)
CO2 SERPL-SCNC: 27 MMOL/L (ref 20–32)
CREAT SERPL-MCNC: 1.05 MG/DL (ref 0.7–1.18)
GFRSERPLBLD MDRD-ARVRAT: 67 ML/MIN/1.73M2
GLUCOSE SERPL-MCNC: 106 MG/DL (ref 65–139)
POTASSIUM SERPL-SCNC: 5.4 MMOL/L (ref 3.5–5.3)
SODIUM SERPL-SCNC: 134 MMOL/L (ref 135–146)

## 2020-10-19 ENCOUNTER — TELEPHONE (OUTPATIENT)
Dept: FAMILY MEDICINE | Facility: CLINIC | Age: 80
End: 2020-10-19

## 2020-10-19 NOTE — TELEPHONE ENCOUNTER
----- Message from Jake Kovacs MD sent at 10/16/2020  1:12 PM CDT -----  Call patient.  His kidney function is now back to normal.  Sodium and potassium seems stable.  Continue drinking plenty of water every day for your kidneys.

## 2020-11-12 ENCOUNTER — TELEPHONE (OUTPATIENT)
Dept: FAMILY MEDICINE | Facility: CLINIC | Age: 80
End: 2020-11-12

## 2020-11-12 NOTE — TELEPHONE ENCOUNTER
----- Message from Suzi Matthews sent at 11/12/2020 11:49 AM CST -----  Pt's wife would like permission to come into the office visit preeti Alan # [phone]

## 2020-11-13 ENCOUNTER — OFFICE VISIT (OUTPATIENT)
Dept: FAMILY MEDICINE | Facility: CLINIC | Age: 80
End: 2020-11-13
Payer: MEDICARE

## 2020-11-13 VITALS
HEART RATE: 67 BPM | HEIGHT: 64 IN | SYSTOLIC BLOOD PRESSURE: 116 MMHG | BODY MASS INDEX: 26.18 KG/M2 | WEIGHT: 153.38 LBS | TEMPERATURE: 97 F | DIASTOLIC BLOOD PRESSURE: 52 MMHG

## 2020-11-13 DIAGNOSIS — G31.84 MCI (MILD COGNITIVE IMPAIRMENT): Primary | ICD-10-CM

## 2020-11-13 DIAGNOSIS — G47.33 OSA ON CPAP: ICD-10-CM

## 2020-11-13 DIAGNOSIS — E03.9 HYPOTHYROIDISM, UNSPECIFIED TYPE: ICD-10-CM

## 2020-11-13 DIAGNOSIS — E78.2 MIXED HYPERLIPIDEMIA: ICD-10-CM

## 2020-11-13 DIAGNOSIS — I10 ESSENTIAL HYPERTENSION: ICD-10-CM

## 2020-11-13 PROCEDURE — 3074F SYST BP LT 130 MM HG: CPT | Mod: S$GLB,,, | Performed by: PHYSICIAN ASSISTANT

## 2020-11-13 PROCEDURE — 1101F PR PT FALLS ASSESS DOC 0-1 FALLS W/OUT INJ PAST YR: ICD-10-PCS | Mod: S$GLB,,, | Performed by: PHYSICIAN ASSISTANT

## 2020-11-13 PROCEDURE — 1159F PR MEDICATION LIST DOCUMENTED IN MEDICAL RECORD: ICD-10-PCS | Mod: S$GLB,,, | Performed by: PHYSICIAN ASSISTANT

## 2020-11-13 PROCEDURE — 1159F MED LIST DOCD IN RCRD: CPT | Mod: S$GLB,,, | Performed by: PHYSICIAN ASSISTANT

## 2020-11-13 PROCEDURE — 3078F DIAST BP <80 MM HG: CPT | Mod: S$GLB,,, | Performed by: PHYSICIAN ASSISTANT

## 2020-11-13 PROCEDURE — 3288F PR FALLS RISK ASSESSMENT DOCUMENTED: ICD-10-PCS | Mod: S$GLB,,, | Performed by: PHYSICIAN ASSISTANT

## 2020-11-13 PROCEDURE — 99214 OFFICE O/P EST MOD 30 MIN: CPT | Mod: S$GLB,,, | Performed by: PHYSICIAN ASSISTANT

## 2020-11-13 PROCEDURE — 3078F PR MOST RECENT DIASTOLIC BLOOD PRESSURE < 80 MM HG: ICD-10-PCS | Mod: S$GLB,,, | Performed by: PHYSICIAN ASSISTANT

## 2020-11-13 PROCEDURE — 3288F FALL RISK ASSESSMENT DOCD: CPT | Mod: S$GLB,,, | Performed by: PHYSICIAN ASSISTANT

## 2020-11-13 PROCEDURE — 3074F PR MOST RECENT SYSTOLIC BLOOD PRESSURE < 130 MM HG: ICD-10-PCS | Mod: S$GLB,,, | Performed by: PHYSICIAN ASSISTANT

## 2020-11-13 PROCEDURE — 99214 PR OFFICE/OUTPT VISIT, EST, LEVL IV, 30-39 MIN: ICD-10-PCS | Mod: S$GLB,,, | Performed by: PHYSICIAN ASSISTANT

## 2020-11-13 PROCEDURE — 1101F PT FALLS ASSESS-DOCD LE1/YR: CPT | Mod: S$GLB,,, | Performed by: PHYSICIAN ASSISTANT

## 2020-11-13 RX ORDER — DILTIAZEM HYDROCHLORIDE 360 MG/1
360 CAPSULE, EXTENDED RELEASE ORAL DAILY
Qty: 90 CAPSULE | Refills: 1 | Status: ON HOLD | OUTPATIENT
Start: 2020-11-13 | End: 2021-01-11 | Stop reason: HOSPADM

## 2020-11-13 RX ORDER — DONEPEZIL HYDROCHLORIDE 10 MG/1
10 TABLET, FILM COATED ORAL NIGHTLY
Qty: 90 TABLET | Refills: 1 | Status: SHIPPED | OUTPATIENT
Start: 2020-11-13 | End: 2021-03-16 | Stop reason: SDUPTHER

## 2020-11-13 NOTE — PROGRESS NOTES
SUBJECTIVE:    Patient ID: Song Lopez Jr. is a 80 y.o. male.    Chief Complaint: Follow-up (no complaints medication refills. brought med [facility]-)    81 yo male presents for regular checkup and refills. Reports that he has been doing pretty well. Dr. Peralta follows with regard to the TERI and pul fibrosis. Reports that he is breathing ok now. Does find that he cannot get around and do all of the things that he used to. Weight has declined in the past year or so. Stable from previous visit. Recent labs have all been stable. He does admit some issues with memory. Loses track of the thoughts in conversation. Dr. Kovacs has started him on aricept with some improvement. He is oriented to place and time.       Telephone on 10/13/2020   Component Date Value Ref Range Status    Glucose 10/14/2020 106  65 - 139 mg/dL Final    BUN 10/14/2020 14  7 - 25 mg/dL Final    Creatinine 10/14/2020 1.05  0.70 - 1.18 mg/dL Final    eGFR if non  10/14/2020 67  > OR = 60 mL/min/1.73m2 Final    eGFR if African American 10/14/2020 78  > OR = 60 mL/min/1.73m2 Final    BUN/Creatinine Ratio 10/14/2020 NOT APPLICABLE  6 - 22 (calc) Final    Sodium 10/14/2020 134* 135 - 146 mmol/L Final    Potassium 10/14/2020 5.4* 3.5 - 5.3 mmol/L Final    Chloride 10/14/2020 98  98 - 110 mmol/L Final    CO2 10/14/2020 27  20 - 32 mmol/L Final    Calcium 10/14/2020 9.6  8.6 - 10.3 mg/dL Final   Office Visit on 07/20/2020   Component Date Value Ref Range Status    Glucose 07/20/2020 96  65 - 139 mg/dL Final    BUN 07/20/2020 15  7 - 25 mg/dL Final    Creatinine 07/20/2020 1.39* 0.70 - 1.18 mg/dL Final    eGFR if non African American 07/20/2020 48* > OR = 60 mL/min/1.73m2 Final    eGFR if  07/20/2020 55* > OR = 60 mL/min/1.73m2 Final    BUN/Creatinine Ratio 07/20/2020 11  6 - 22 (calc) Final    Sodium 07/20/2020 133* 135 - 146 mmol/L Final    Potassium 07/20/2020 5.4* 3.5 - 5.3 mmol/L Final    Chloride  07/20/2020 98  98 - 110 mmol/L Final    CO2 07/20/2020 26  20 - 32 mmol/L Final    Calcium 07/20/2020 9.6  8.6 - 10.3 mg/dL Final    Total Protein 07/20/2020 6.7  6.1 - 8.1 g/dL Final    Albumin 07/20/2020 4.5  3.6 - 5.1 g/dL Final    Globulin, Total 07/20/2020 2.2  1.9 - 3.7 g/dL (calc) Final    Albumin/Globulin Ratio 07/20/2020 2.0  1.0 - 2.5 (calc) Final    Total Bilirubin 07/20/2020 0.7  0.2 - 1.2 mg/dL Final    Alkaline Phosphatase 07/20/2020 62  35 - 144 U/L Final    AST 07/20/2020 16  10 - 35 U/L Final    ALT 07/20/2020 11  9 - 46 U/L Final    Cholesterol 07/20/2020 125  <200 mg/dL Final    HDL 07/20/2020 34* > OR = 40 mg/dL Final    Triglycerides 07/20/2020 96  <150 mg/dL Final    LDL Cholesterol 07/20/2020 73  mg/dL (calc) Final    HDL/Cholesterol Ratio 07/20/2020 3.7  <5.0 (calc) Final    Non HDL Chol. (LDL+VLDL) 07/20/2020 91  <130 mg/dL (calc) Final    TSH w/reflex to FT4 07/20/2020 0.77  0.40 - 4.50 mIU/L Final   Lab Visit on 07/11/2020   Component Date Value Ref Range Status    SARS-CoV2 (COVID-19) Qualitative P* 07/11/2020 Not Detected  Not Detected Final   Lab Visit on 05/26/2020   Component Date Value Ref Range Status    SARS-CoV2 (COVID-19) Qualitative P* 05/26/2020 Not Detected  Not Detected Final       Past Medical History:   Diagnosis Date    Chronic gout     Hypertension     TERI (obstructive sleep apnea)     Polycythemia     Squamous cell carcinoma     Thyroid disease      Past Surgical History:   Procedure Laterality Date    CAROTID ARTERY ANGIOPLASTY  right    carotid endarderectomy      CATARACT EXTRACTION, BILATERAL      CATARACT EXTRACTION, BILATERAL      left wrist surgery       History reviewed. No pertinent family history.    Marital Status:   Alcohol History:  reports current alcohol use.  Tobacco History:  reports that he has quit smoking. His smoking use included cigarettes. He has a 25.00 pack-year smoking history. He has never used smokeless  tobacco.  Drug History:  reports no history of drug use.    Review of patient's allergies indicates:   Allergen Reactions    Doxycycline     Penicillins Other (See Comments)     Skin turns red or appears sunburned       Current Outpatient Medications:     allopurinoL (ZYLOPRIM) 100 MG tablet, Take 1 tablet (100 mg total) by mouth once daily., Disp: 90 tablet, Rfl: 1    aspirin (ECOTRIN) 81 MG EC tablet, Take 81 mg by mouth once daily., Disp: , Rfl:     brimonidine 0.2% (ALPHAGAN) 0.2 % Drop, INSTILL 1 DROP INTO EACH EYE TWICE DAILY FOR 30 DAYS, Disp: , Rfl:     cyanocobalamin (VITAMIN B-12) 500 MCG tablet, Take 500 mcg by mouth once daily., Disp: , Rfl:     diltiaZEM (TIAZAC) 360 MG Cs24, Take 1 capsule (360 mg total) by mouth once daily., Disp: 90 capsule, Rfl: 1    donepeziL (ARICEPT) 10 MG tablet, Take 1 tablet (10 mg total) by mouth every evening., Disp: 90 tablet, Rfl: 1    latanoprost 0.005 % ophthalmic solution, Place 1 drop into both eyes every evening., Disp: , Rfl:     levothyroxine (SYNTHROID) 88 MCG tablet, Take 1 tablet (88 mcg total) by mouth before breakfast., Disp: 90 tablet, Rfl: 1    omeprazole (PRILOSEC) 20 MG capsule, Take 1 capsule (20 mg total) by mouth once daily., Disp: 90 capsule, Rfl: 1    rosuvastatin (CRESTOR) 10 MG tablet, Take 1 tablet (10 mg total) by mouth once daily., Disp: 90 tablet, Rfl: 3    Review of Systems   Constitutional: Negative for activity change, fatigue, fever and unexpected weight change.   HENT: Negative for congestion.    Respiratory: Negative for apnea, cough, chest tightness and shortness of breath.    Cardiovascular: Negative for chest pain and palpitations.   Gastrointestinal: Negative for abdominal distention and abdominal pain.   Genitourinary: Negative for difficulty urinating and dysuria.   Musculoskeletal: Negative for arthralgias and back pain.   Neurological: Negative for dizziness, syncope, weakness, light-headedness and headaches.        Mild  "cognitive issues          Objective:      Vitals:    11/13/20 0923   BP: (!) 116/52   Pulse: 67   Temp: 96.8 °F (36 °C)   Weight: 69.6 kg (153 lb 6.4 oz)   Height: 5' 4" (1.626 m)     Physical Exam  Vitals signs and nursing note reviewed.   Constitutional:       Appearance: He is well-developed.      Comments: Thin appearing white male in no apparent distress   HENT:      Head: Normocephalic and atraumatic.      Right Ear: External ear normal.      Left Ear: External ear normal.      Nose: Nose normal.   Eyes:      Pupils: Pupils are equal, round, and reactive to light.   Neck:      Musculoskeletal: Normal range of motion and neck supple.      Thyroid: No thyromegaly.      Vascular: No carotid bruit.   Cardiovascular:      Rate and Rhythm: Normal rate and regular rhythm.      Heart sounds: Normal heart sounds. No murmur.   Pulmonary:      Effort: Pulmonary effort is normal.      Breath sounds: Normal breath sounds. No wheezing or rales.   Abdominal:      General: Bowel sounds are normal. There is no distension.      Palpations: Abdomen is soft.      Tenderness: There is no abdominal tenderness.   Musculoskeletal: Normal range of motion.         General: No tenderness or deformity.      Lumbar back: Normal. He exhibits no pain and no spasm.      Comments: Bends 90 degrees at  waist, shoulders and knees have good range of motion.   Lymphadenopathy:      Cervical: No cervical adenopathy.   Skin:     General: Skin is warm and dry.      Findings: No rash.   Neurological:      Mental Status: He is alert and oriented to person, place, and time.      Cranial Nerves: No cranial nerve deficit.      Coordination: Coordination normal.   Psychiatric:         Behavior: Behavior normal.         Thought Content: Thought content normal.         Judgment: Judgment normal.      Comments: Mood appears flat and depressed.           Assessment:       1. MCI (mild cognitive impairment)    2. Essential hypertension    3. TERI on CPAP    4. " Mixed hyperlipidemia    5. Hypothyroidism, unspecified type         Plan:       MCI (mild cognitive impairment)  Comments:  I suspect mild decline in cognition. No major red flags or alarming issues. Plan to increase aricept and will see how he does.  Orders:  -     donepeziL (ARICEPT) 10 MG tablet; Take 1 tablet (10 mg total) by mouth every evening.  Dispense: 90 tablet; Refill: 1    Essential hypertension  Comments:  well managed. continue as is.  Orders:  -     diltiaZEM (TIAZAC) 360 MG Cs24; Take 1 capsule (360 mg total) by mouth once daily.  Dispense: 90 capsule; Refill: 1    TERI on CPAP  Comments:  stable, compliant with cpap nightly. continue as is.    Mixed hyperlipidemia  Comments:  Doing well. continue as is.    Hypothyroidism, unspecified type      Follow up in about 4 months (around 3/13/2021), or Dr. Fermín martell, for MCI checkup.        11/13/2020 Igor Rios PA-C

## 2020-11-13 NOTE — PATIENT INSTRUCTIONS
Obstructive Sleep Apnea  Obstructive sleep apnea is a condition that causes your air passages to become narrowed or blocked during sleep. As a result, breathing stops for short periods. Your body wakes up enough for breathing to begin again, though you don't remember it. The cycle of stopped breathing and brief awakenings can repeat dozens of times a night. This prevents the body from getting to the deeper stages of sleep that are needed for good rest and may cause your body's oxygen level to fall.  Signs of sleep apnea include loud snoring, noisy breathing, and gasping sounds during sleep. Daytime symptoms include waking up tired after a full night's sleep, waking up with headaches, feeling very sleepy or falling asleep during the day, and having problems with memory or concentration.  Risk factors for sleep apnea include:  · Being overweight  · Being a man, or a woman in menopause  · Smoking  · Using alcohol or sedating medicines  · Having enlarged structures in the nose or throat  Home care  Lifestyle changes that can help treat snoring and sleep apnea include the following:  · If you are overweight, lose weight. Talk to your healthcare provider about a weight-loss plan for you.  · Avoid alcohol for 3 to 4 hours before bedtime. Avoid sedating medications. Ask your healthcare provider about the medicines you take.  · If you smoke, talk to your healthcare provider about ways to quit.  · Sleep on your side. This can help prevent gravity from pulling relaxed throat tissues into your breathing passages.  · If you have allergies or sinus problems that block your nose, ask your healthcare provider for help.  Follow-up care  Follow up with your healthcare provider, or as advised. A diagnosis of sleep apnea is made with a sleep study. Your healthcare provider can tell you more about this test.  When to seek medical advice  Sleep apnea can make you more likely to have certain health problems. These include high blood  pressure, heart attack, stroke, and sexual dysfunction. If you have sleep apnea, talk to your healthcare provider about the best treatments for you.  Date Last Reviewed: 4/1/2017  © 6225-0592 [employer]. [address] [place] [address], [place] [address]. All rights reserved. This information is not intended as a substitute for professional medical care. Always follow your healthcare professional's instructions.

## 2020-11-17 ENCOUNTER — LAB VISIT (OUTPATIENT)
Dept: PRIMARY CARE CLINIC | Facility: OTHER | Age: 80
End: 2020-11-17
Attending: INTERNAL MEDICINE
Payer: MEDICARE

## 2020-11-17 DIAGNOSIS — Z11.59 SPECIAL SCREENING EXAMINATION FOR UNSPECIFIED VIRAL DISEASE: ICD-10-CM

## 2020-11-17 PROCEDURE — U0003 INFECTIOUS AGENT DETECTION BY NUCLEIC ACID (DNA OR RNA); SEVERE ACUTE RESPIRATORY SYNDROME CORONAVIRUS 2 (SARS-COV-2) (CORONAVIRUS DISEASE [COVID-19]), AMPLIFIED PROBE TECHNIQUE, MAKING USE OF HIGH THROUGHPUT TECHNOLOGIES AS DESCRIBED BY CMS-2020-01-R: HCPCS

## 2020-11-20 LAB — SARS-COV-2 RNA RESP QL NAA+PROBE: NOT DETECTED

## 2020-12-28 ENCOUNTER — LAB VISIT (OUTPATIENT)
Dept: PRIMARY CARE CLINIC | Facility: OTHER | Age: 80
End: 2020-12-28
Attending: INTERNAL MEDICINE
Payer: MEDICARE

## 2020-12-28 DIAGNOSIS — Z03.818 ENCOUNTER FOR OBSERVATION FOR SUSPECTED EXPOSURE TO OTHER BIOLOGICAL AGENTS RULED OUT: ICD-10-CM

## 2020-12-28 PROCEDURE — U0003 INFECTIOUS AGENT DETECTION BY NUCLEIC ACID (DNA OR RNA); SEVERE ACUTE RESPIRATORY SYNDROME CORONAVIRUS 2 (SARS-COV-2) (CORONAVIRUS DISEASE [COVID-19]), AMPLIFIED PROBE TECHNIQUE, MAKING USE OF HIGH THROUGHPUT TECHNOLOGIES AS DESCRIBED BY CMS-2020-01-R: HCPCS

## 2020-12-29 LAB — SARS-COV-2 RNA RESP QL NAA+PROBE: NOT DETECTED

## 2020-12-30 ENCOUNTER — HOSPITAL ENCOUNTER (INPATIENT)
Facility: HOSPITAL | Age: 80
LOS: 11 days | Discharge: HOME HEALTH | DRG: 234 | End: 2021-01-11
Attending: EMERGENCY MEDICINE | Admitting: INTERNAL MEDICINE
Payer: MEDICARE

## 2020-12-30 DIAGNOSIS — I48.0 PAROXYSMAL ATRIAL FIBRILLATION: ICD-10-CM

## 2020-12-30 DIAGNOSIS — R07.9 CHEST PAIN, UNSPECIFIED TYPE: ICD-10-CM

## 2020-12-30 DIAGNOSIS — R07.9 CHEST PAIN: ICD-10-CM

## 2020-12-30 DIAGNOSIS — D68.9 COAGULOPATHY: ICD-10-CM

## 2020-12-30 DIAGNOSIS — Z95.1 HX OF CABG: ICD-10-CM

## 2020-12-30 DIAGNOSIS — D50.0 IRON DEFICIENCY ANEMIA DUE TO CHRONIC BLOOD LOSS: ICD-10-CM

## 2020-12-30 DIAGNOSIS — I25.10 CAD (CORONARY ARTERY DISEASE): ICD-10-CM

## 2020-12-30 DIAGNOSIS — Z95.1 POSTSURGICAL AORTOCORONARY BYPASS STATUS: ICD-10-CM

## 2020-12-30 DIAGNOSIS — Z95.1 S/P CABG (CORONARY ARTERY BYPASS GRAFT): Primary | Chronic | ICD-10-CM

## 2020-12-30 DIAGNOSIS — J84.10 PULMONARY FIBROSIS, UNSPECIFIED: ICD-10-CM

## 2020-12-30 DIAGNOSIS — I21.4 NSTEMI (NON-ST ELEVATED MYOCARDIAL INFARCTION): ICD-10-CM

## 2020-12-30 PROBLEM — D72.829 LEUKOCYTOSIS: Status: ACTIVE | Noted: 2020-12-30

## 2020-12-30 LAB
ALBUMIN SERPL BCP-MCNC: 4.2 G/DL (ref 3.5–5.2)
ALP SERPL-CCNC: 64 U/L (ref 55–135)
ALT SERPL W/O P-5'-P-CCNC: 13 U/L (ref 10–44)
ANION GAP SERPL CALC-SCNC: 8 MMOL/L (ref 8–16)
AST SERPL-CCNC: 19 U/L (ref 10–40)
BASOPHILS # BLD AUTO: 0.33 K/UL (ref 0–0.2)
BASOPHILS NFR BLD: 2.2 % (ref 0–1.9)
BILIRUB SERPL-MCNC: 0.6 MG/DL (ref 0.1–1)
BNP SERPL-MCNC: 161 PG/ML (ref 0–99)
BUN SERPL-MCNC: 13 MG/DL (ref 8–23)
CALCIUM SERPL-MCNC: 8.8 MG/DL (ref 8.7–10.5)
CHLORIDE SERPL-SCNC: 102 MMOL/L (ref 95–110)
CK MB SERPL-MCNC: 2.2 NG/ML (ref 0.1–6.5)
CK SERPL-CCNC: 35 U/L (ref 20–200)
CO2 SERPL-SCNC: 24 MMOL/L (ref 23–29)
CREAT SERPL-MCNC: 1.2 MG/DL (ref 0.5–1.4)
DIFFERENTIAL METHOD: ABNORMAL
EOSINOPHIL # BLD AUTO: 0.5 K/UL (ref 0–0.5)
EOSINOPHIL NFR BLD: 3.5 % (ref 0–8)
ERYTHROCYTE [DISTWIDTH] IN BLOOD BY AUTOMATED COUNT: 17.3 % (ref 11.5–14.5)
EST. GFR  (AFRICAN AMERICAN): >60 ML/MIN/1.73 M^2
EST. GFR  (NON AFRICAN AMERICAN): 57.2 ML/MIN/1.73 M^2
GLUCOSE SERPL-MCNC: 95 MG/DL (ref 70–110)
HCT VFR BLD AUTO: 35.9 % (ref 40–54)
HGB BLD-MCNC: 10.6 G/DL (ref 14–18)
IMM GRANULOCYTES # BLD AUTO: 0.11 K/UL (ref 0–0.04)
IMM GRANULOCYTES NFR BLD AUTO: 0.7 % (ref 0–0.5)
LYMPHOCYTES # BLD AUTO: 1.4 K/UL (ref 1–4.8)
LYMPHOCYTES NFR BLD: 8.9 % (ref 18–48)
MAGNESIUM SERPL-MCNC: 1.7 MG/DL (ref 1.6–2.6)
MCH RBC QN AUTO: 19.3 PG (ref 27–31)
MCHC RBC AUTO-ENTMCNC: 29.5 G/DL (ref 32–36)
MCV RBC AUTO: 66 FL (ref 82–98)
MONOCYTES # BLD AUTO: 0.9 K/UL (ref 0.3–1)
MONOCYTES NFR BLD: 5.6 % (ref 4–15)
NEUTROPHILS # BLD AUTO: 12.1 K/UL (ref 1.8–7.7)
NEUTROPHILS NFR BLD: 79.1 % (ref 38–73)
NRBC BLD-RTO: 0 /100 WBC
PLATELET # BLD AUTO: 900 K/UL (ref 150–350)
PMV BLD AUTO: 9.8 FL (ref 9.2–12.9)
POTASSIUM SERPL-SCNC: 3.8 MMOL/L (ref 3.5–5.1)
PROT SERPL-MCNC: 6.6 G/DL (ref 6–8.4)
RBC # BLD AUTO: 5.48 M/UL (ref 4.6–6.2)
SARS-COV-2 RDRP RESP QL NAA+PROBE: NEGATIVE
SODIUM SERPL-SCNC: 134 MMOL/L (ref 136–145)
TROPONIN I SERPL DL<=0.01 NG/ML-MCNC: <0.03 NG/ML
WBC # BLD AUTO: 15.25 K/UL (ref 3.9–12.7)

## 2020-12-30 PROCEDURE — G0378 HOSPITAL OBSERVATION PER HR: HCPCS

## 2020-12-30 PROCEDURE — 83735 ASSAY OF MAGNESIUM: CPT

## 2020-12-30 PROCEDURE — 93010 ELECTROCARDIOGRAM REPORT: CPT | Mod: ,,, | Performed by: INTERNAL MEDICINE

## 2020-12-30 PROCEDURE — 80053 COMPREHEN METABOLIC PANEL: CPT

## 2020-12-30 PROCEDURE — 82553 CREATINE MB FRACTION: CPT

## 2020-12-30 PROCEDURE — 63600175 PHARM REV CODE 636 W HCPCS: Performed by: NURSE PRACTITIONER

## 2020-12-30 PROCEDURE — 25000003 PHARM REV CODE 250: Performed by: NURSE PRACTITIONER

## 2020-12-30 PROCEDURE — 82550 ASSAY OF CK (CPK): CPT

## 2020-12-30 PROCEDURE — 83880 ASSAY OF NATRIURETIC PEPTIDE: CPT

## 2020-12-30 PROCEDURE — 93005 ELECTROCARDIOGRAM TRACING: CPT | Performed by: INTERNAL MEDICINE

## 2020-12-30 PROCEDURE — 84484 ASSAY OF TROPONIN QUANT: CPT

## 2020-12-30 PROCEDURE — U0002 COVID-19 LAB TEST NON-CDC: HCPCS

## 2020-12-30 PROCEDURE — 93010 EKG 12-LEAD: ICD-10-PCS | Mod: ,,, | Performed by: INTERNAL MEDICINE

## 2020-12-30 PROCEDURE — 85025 COMPLETE CBC W/AUTO DIFF WBC: CPT

## 2020-12-30 PROCEDURE — 99285 EMERGENCY DEPT VISIT HI MDM: CPT | Mod: 25

## 2020-12-30 RX ORDER — DONEPEZIL HYDROCHLORIDE 5 MG/1
10 TABLET, FILM COATED ORAL NIGHTLY
Status: DISCONTINUED | OUTPATIENT
Start: 2020-12-30 | End: 2021-01-11 | Stop reason: HOSPADM

## 2020-12-30 RX ORDER — LATANOPROST 50 UG/ML
1 SOLUTION/ DROPS OPHTHALMIC NIGHTLY
Status: DISCONTINUED | OUTPATIENT
Start: 2020-12-30 | End: 2021-01-11 | Stop reason: HOSPADM

## 2020-12-30 RX ORDER — ONDANSETRON 2 MG/ML
4 INJECTION INTRAMUSCULAR; INTRAVENOUS EVERY 8 HOURS PRN
Status: DISCONTINUED | OUTPATIENT
Start: 2020-12-30 | End: 2021-01-01

## 2020-12-30 RX ORDER — ALLOPURINOL 100 MG/1
100 TABLET ORAL DAILY
Status: DISCONTINUED | OUTPATIENT
Start: 2020-12-31 | End: 2021-01-11 | Stop reason: HOSPADM

## 2020-12-30 RX ORDER — PANTOPRAZOLE SODIUM 40 MG/1
40 TABLET, DELAYED RELEASE ORAL
Status: DISCONTINUED | OUTPATIENT
Start: 2020-12-31 | End: 2021-01-11 | Stop reason: HOSPADM

## 2020-12-30 RX ORDER — ACETAMINOPHEN 325 MG/1
650 TABLET ORAL EVERY 4 HOURS PRN
Status: DISCONTINUED | OUTPATIENT
Start: 2020-12-30 | End: 2021-01-01

## 2020-12-30 RX ORDER — DILTIAZEM HYDROCHLORIDE 180 MG/1
360 CAPSULE, COATED, EXTENDED RELEASE ORAL DAILY
Status: DISCONTINUED | OUTPATIENT
Start: 2020-12-31 | End: 2021-01-02

## 2020-12-30 RX ORDER — NAPROXEN SODIUM 220 MG/1
81 TABLET, FILM COATED ORAL DAILY
Status: DISCONTINUED | OUTPATIENT
Start: 2020-12-30 | End: 2020-12-30

## 2020-12-30 RX ORDER — ENOXAPARIN SODIUM 100 MG/ML
40 INJECTION SUBCUTANEOUS EVERY 24 HOURS
Status: DISCONTINUED | OUTPATIENT
Start: 2020-12-30 | End: 2020-12-31

## 2020-12-30 RX ORDER — ROSUVASTATIN CALCIUM 10 MG/1
10 TABLET, COATED ORAL DAILY
Status: DISCONTINUED | OUTPATIENT
Start: 2020-12-31 | End: 2021-01-11 | Stop reason: HOSPADM

## 2020-12-30 RX ORDER — TALC
6 POWDER (GRAM) TOPICAL NIGHTLY PRN
Status: DISCONTINUED | OUTPATIENT
Start: 2020-12-30 | End: 2021-01-01

## 2020-12-30 RX ORDER — ASPIRIN 81 MG/1
81 TABLET ORAL NIGHTLY
Status: DISCONTINUED | OUTPATIENT
Start: 2020-12-30 | End: 2021-01-01

## 2020-12-30 RX ORDER — SODIUM CHLORIDE 0.9 % (FLUSH) 0.9 %
10 SYRINGE (ML) INJECTION
Status: DISCONTINUED | OUTPATIENT
Start: 2020-12-30 | End: 2021-01-01

## 2020-12-30 RX ORDER — MORPHINE SULFATE 4 MG/ML
4 INJECTION, SOLUTION INTRAMUSCULAR; INTRAVENOUS EVERY 4 HOURS PRN
Status: DISCONTINUED | OUTPATIENT
Start: 2020-12-30 | End: 2021-01-01

## 2020-12-30 RX ORDER — BISACODYL 10 MG
10 SUPPOSITORY, RECTAL RECTAL DAILY PRN
Status: DISCONTINUED | OUTPATIENT
Start: 2020-12-30 | End: 2021-01-01

## 2020-12-30 RX ORDER — BRIMONIDINE TARTRATE 1.5 MG/ML
1 SOLUTION/ DROPS OPHTHALMIC 3 TIMES DAILY
Status: DISCONTINUED | OUTPATIENT
Start: 2020-12-31 | End: 2021-01-10

## 2020-12-30 RX ORDER — LEVOTHYROXINE SODIUM 88 UG/1
88 TABLET ORAL
Status: DISCONTINUED | OUTPATIENT
Start: 2020-12-31 | End: 2021-01-11 | Stop reason: HOSPADM

## 2020-12-30 RX ORDER — NITROGLYCERIN 0.4 MG/1
0.4 TABLET SUBLINGUAL EVERY 5 MIN PRN
Status: DISCONTINUED | OUTPATIENT
Start: 2020-12-30 | End: 2021-01-01

## 2020-12-30 RX ADMIN — ASPIRIN 81 MG: 81 TABLET, DELAYED RELEASE ORAL at 11:12

## 2020-12-30 RX ADMIN — ENOXAPARIN SODIUM 40 MG: 40 INJECTION SUBCUTANEOUS at 11:12

## 2020-12-30 RX ADMIN — DONEPEZIL HYDROCHLORIDE 10 MG: 5 TABLET, FILM COATED ORAL at 11:12

## 2020-12-31 ENCOUNTER — ANESTHESIA EVENT (OUTPATIENT)
Dept: SURGERY | Facility: HOSPITAL | Age: 80
DRG: 234 | End: 2020-12-31
Payer: MEDICARE

## 2020-12-31 ENCOUNTER — CLINICAL SUPPORT (OUTPATIENT)
Dept: CARDIOLOGY | Facility: HOSPITAL | Age: 80
DRG: 234 | End: 2020-12-31
Attending: FAMILY MEDICINE
Payer: MEDICARE

## 2020-12-31 PROBLEM — G47.33 OSA ON CPAP: Chronic | Status: ACTIVE | Noted: 2017-08-07

## 2020-12-31 PROBLEM — D45 POLYCYTHEMIA VERA: Chronic | Status: ACTIVE | Noted: 2017-07-19

## 2020-12-31 PROBLEM — I21.4 NSTEMI (NON-ST ELEVATED MYOCARDIAL INFARCTION): Status: ACTIVE | Noted: 2020-12-30

## 2020-12-31 LAB
ABO + RH BLD: NORMAL
ALBUMIN SERPL BCP-MCNC: 3.8 G/DL (ref 3.5–5.2)
ALP SERPL-CCNC: 62 U/L (ref 55–135)
ALT SERPL W/O P-5'-P-CCNC: 13 U/L (ref 10–44)
ANION GAP SERPL CALC-SCNC: 5 MMOL/L (ref 8–16)
ANION GAP SERPL CALC-SCNC: 8 MMOL/L (ref 8–16)
AORTIC ROOT ANNULUS: 3.09 CM
AORTIC VALVE CUSP SEPERATION: 1.24 CM
APTT PPP: 54.1 SEC (ref 23.6–33.3)
APTT PPP: 59.7 SEC (ref 23.6–33.3)
AST SERPL-CCNC: 28 U/L (ref 10–40)
AV INDEX (PROSTH): 0.62
AV MEAN GRADIENT: 4 MMHG
AV PEAK GRADIENT: 7 MMHG
AV VALVE AREA: 1.86 CM2
AV VELOCITY RATIO: 62.05
BASOPHILS # BLD AUTO: 0.24 K/UL (ref 0–0.2)
BASOPHILS # BLD AUTO: 0.3 K/UL (ref 0–0.2)
BASOPHILS NFR BLD: 1.8 % (ref 0–1.9)
BASOPHILS NFR BLD: 2 % (ref 0–1.9)
BILIRUB SERPL-MCNC: 0.9 MG/DL (ref 0.1–1)
BLD GP AB SCN CELLS X3 SERPL QL: NORMAL
BSA FOR ECHO PROCEDURE: 1.86 M2
BUN SERPL-MCNC: 11 MG/DL (ref 8–23)
BUN SERPL-MCNC: 13 MG/DL (ref 8–23)
CALCIUM SERPL-MCNC: 8.4 MG/DL (ref 8.7–10.5)
CALCIUM SERPL-MCNC: 9 MG/DL (ref 8.7–10.5)
CHLORIDE SERPL-SCNC: 101 MMOL/L (ref 95–110)
CHLORIDE SERPL-SCNC: 104 MMOL/L (ref 95–110)
CHOLEST SERPL-MCNC: 109 MG/DL (ref 120–199)
CHOLEST/HDLC SERPL: 3.2 {RATIO} (ref 2–5)
CO2 SERPL-SCNC: 22 MMOL/L (ref 23–29)
CO2 SERPL-SCNC: 24 MMOL/L (ref 23–29)
CREAT SERPL-MCNC: 0.9 MG/DL (ref 0.5–1.4)
CREAT SERPL-MCNC: 1 MG/DL (ref 0.5–1.4)
CV ECHO LV RWT: 0.37 CM
DIFFERENTIAL METHOD: ABNORMAL
DIFFERENTIAL METHOD: ABNORMAL
DOP CALC AO PEAK VEL: 1.3 M/S
DOP CALC AO VTI: 28.19 CM
DOP CALC LVOT AREA: 3 CM2
DOP CALC LVOT DIAMETER: 1.96 CM
DOP CALC LVOT PEAK VEL: 80.67 M/S
DOP CALC LVOT STROKE VOLUME: 52.47 CM3
DOP CALCLVOT PEAK VEL VTI: 17.4 CM
E WAVE DECELERATION TIME: 299.85 MSEC
E/A RATIO: 0.67
E/E' RATIO: 11 M/S
ECHO LV POSTERIOR WALL: 0.95 CM (ref 0.6–1.1)
EOSINOPHIL # BLD AUTO: 0.3 K/UL (ref 0–0.5)
EOSINOPHIL # BLD AUTO: 0.5 K/UL (ref 0–0.5)
EOSINOPHIL NFR BLD: 2.3 % (ref 0–8)
EOSINOPHIL NFR BLD: 3.1 % (ref 0–8)
ERYTHROCYTE [DISTWIDTH] IN BLOOD BY AUTOMATED COUNT: 17.2 % (ref 11.5–14.5)
ERYTHROCYTE [DISTWIDTH] IN BLOOD BY AUTOMATED COUNT: 17.2 % (ref 11.5–14.5)
EST. GFR  (AFRICAN AMERICAN): >60 ML/MIN/1.73 M^2
EST. GFR  (AFRICAN AMERICAN): >60 ML/MIN/1.73 M^2
EST. GFR  (NON AFRICAN AMERICAN): >60 ML/MIN/1.73 M^2
EST. GFR  (NON AFRICAN AMERICAN): >60 ML/MIN/1.73 M^2
FRACTIONAL SHORTENING: 32 % (ref 28–44)
GLUCOSE SERPL-MCNC: 124 MG/DL (ref 70–110)
GLUCOSE SERPL-MCNC: 86 MG/DL (ref 70–110)
GLUCOSE SERPL-MCNC: 87 MG/DL (ref 70–110)
HCT VFR BLD AUTO: 35.4 % (ref 40–54)
HCT VFR BLD AUTO: 35.8 % (ref 40–54)
HDLC SERPL-MCNC: 34 MG/DL (ref 40–75)
HDLC SERPL: 31.2 % (ref 20–50)
HGB BLD-MCNC: 10.3 G/DL (ref 14–18)
HGB BLD-MCNC: 10.6 G/DL (ref 14–18)
IMM GRANULOCYTES # BLD AUTO: 0.14 K/UL (ref 0–0.04)
IMM GRANULOCYTES # BLD AUTO: 0.17 K/UL (ref 0–0.04)
IMM GRANULOCYTES NFR BLD AUTO: 1 % (ref 0–0.5)
IMM GRANULOCYTES NFR BLD AUTO: 1.1 % (ref 0–0.5)
INR PPP: 1.3
INR PPP: 1.5
INTERVENTRICULAR SEPTUM: 0.95 CM (ref 0.6–1.1)
LDLC SERPL CALC-MCNC: 58.2 MG/DL (ref 63–159)
LEFT ATRIUM SIZE: 2.89 CM
LEFT INTERNAL DIMENSION IN SYSTOLE: 3.47 CM (ref 2.1–4)
LEFT VENTRICLE DIASTOLIC VOLUME INDEX: 61.58 ML/M2
LEFT VENTRICLE DIASTOLIC VOLUME: 115.26 ML
LEFT VENTRICLE MASS INDEX: 93 G/M2
LEFT VENTRICLE SYSTOLIC VOLUME INDEX: 35.6 ML/M2
LEFT VENTRICLE SYSTOLIC VOLUME: 66.69 ML
LEFT VENTRICULAR INTERNAL DIMENSION IN DIASTOLE: 5.08 CM (ref 3.5–6)
LEFT VENTRICULAR MASS: 174.46 G
LV LATERAL E/E' RATIO: 11 M/S
LV SEPTAL E/E' RATIO: 11 M/S
LYMPHOCYTES # BLD AUTO: 1.4 K/UL (ref 1–4.8)
LYMPHOCYTES # BLD AUTO: 1.6 K/UL (ref 1–4.8)
LYMPHOCYTES NFR BLD: 11.3 % (ref 18–48)
LYMPHOCYTES NFR BLD: 9.5 % (ref 18–48)
MAGNESIUM SERPL-MCNC: 1.9 MG/DL (ref 1.6–2.6)
MCH RBC QN AUTO: 18.6 PG (ref 27–31)
MCH RBC QN AUTO: 19.3 PG (ref 27–31)
MCHC RBC AUTO-ENTMCNC: 29.1 G/DL (ref 32–36)
MCHC RBC AUTO-ENTMCNC: 29.6 G/DL (ref 32–36)
MCV RBC AUTO: 64 FL (ref 82–98)
MCV RBC AUTO: 65 FL (ref 82–98)
MONOCYTES # BLD AUTO: 0.8 K/UL (ref 0.3–1)
MONOCYTES # BLD AUTO: 1 K/UL (ref 0.3–1)
MONOCYTES NFR BLD: 6.1 % (ref 4–15)
MONOCYTES NFR BLD: 6.6 % (ref 4–15)
MRSA SCREEN BY PCR: NEGATIVE
MV PEAK A VEL: 0.99 M/S
MV PEAK E VEL: 0.66 M/S
NEUTROPHILS # BLD AUTO: 12.8 K/UL (ref 1.8–7.7)
NEUTROPHILS # BLD AUTO: 9.4 K/UL (ref 1.8–7.7)
NEUTROPHILS NFR BLD: 76.7 % (ref 38–73)
NEUTROPHILS NFR BLD: 78.5 % (ref 38–73)
NONHDLC SERPL-MCNC: 75 MG/DL
NRBC BLD-RTO: 0 /100 WBC
NRBC BLD-RTO: 0 /100 WBC
PISA TR MAX VEL: 3.29 M/S
PLATELET # BLD AUTO: 862 K/UL (ref 150–350)
PLATELET # BLD AUTO: 891 K/UL (ref 150–350)
PMV BLD AUTO: 10 FL (ref 9.2–12.9)
PMV BLD AUTO: 10.4 FL (ref 9.2–12.9)
POTASSIUM SERPL-SCNC: 3.8 MMOL/L (ref 3.5–5.1)
POTASSIUM SERPL-SCNC: 4.2 MMOL/L (ref 3.5–5.1)
PROT SERPL-MCNC: 6.3 G/DL (ref 6–8.4)
PROTHROMBIN TIME: 15.8 SEC (ref 10.6–14.8)
PROTHROMBIN TIME: 17 SEC (ref 10.6–14.8)
PV PEAK VELOCITY: 90.44 CM/S
RA PRESSURE: 3 MMHG
RBC # BLD AUTO: 5.48 M/UL (ref 4.6–6.2)
RBC # BLD AUTO: 5.54 M/UL (ref 4.6–6.2)
SODIUM SERPL-SCNC: 130 MMOL/L (ref 136–145)
SODIUM SERPL-SCNC: 134 MMOL/L (ref 136–145)
TDI LATERAL: 0.06 M/S
TDI SEPTAL: 0.06 M/S
TDI: 0.06 M/S
TR MAX PG: 43 MMHG
TRIGL SERPL-MCNC: 84 MG/DL (ref 30–150)
TROPONIN I SERPL DL<=0.01 NG/ML-MCNC: 0.9 NG/ML
TROPONIN I SERPL DL<=0.01 NG/ML-MCNC: 1.77 NG/ML
TV REST PULMONARY ARTERY PRESSURE: 46 MMHG
WBC # BLD AUTO: 12.21 K/UL (ref 3.9–12.7)
WBC # BLD AUTO: 16.36 K/UL (ref 3.9–12.7)

## 2020-12-31 PROCEDURE — 93010 EKG 12-LEAD: ICD-10-PCS | Mod: ,,, | Performed by: INTERNAL MEDICINE

## 2020-12-31 PROCEDURE — 63600175 PHARM REV CODE 636 W HCPCS: Performed by: SPECIALIST

## 2020-12-31 PROCEDURE — 85610 PROTHROMBIN TIME: CPT

## 2020-12-31 PROCEDURE — 85025 COMPLETE CBC W/AUTO DIFF WBC: CPT | Mod: 91

## 2020-12-31 PROCEDURE — 25000003 PHARM REV CODE 250: Performed by: SPECIALIST

## 2020-12-31 PROCEDURE — 93010 ELECTROCARDIOGRAM REPORT: CPT | Mod: ,,, | Performed by: INTERNAL MEDICINE

## 2020-12-31 PROCEDURE — 80048 BASIC METABOLIC PNL TOTAL CA: CPT

## 2020-12-31 PROCEDURE — 86870 RBC ANTIBODY IDENTIFICATION: CPT

## 2020-12-31 PROCEDURE — 85610 PROTHROMBIN TIME: CPT | Mod: 91

## 2020-12-31 PROCEDURE — 36415 COLL VENOUS BLD VENIPUNCTURE: CPT

## 2020-12-31 PROCEDURE — C1894 INTRO/SHEATH, NON-LASER: HCPCS | Performed by: SPECIALIST

## 2020-12-31 PROCEDURE — 93005 ELECTROCARDIOGRAM TRACING: CPT | Performed by: INTERNAL MEDICINE

## 2020-12-31 PROCEDURE — 63600175 PHARM REV CODE 636 W HCPCS: Performed by: NURSE PRACTITIONER

## 2020-12-31 PROCEDURE — 83036 HEMOGLOBIN GLYCOSYLATED A1C: CPT

## 2020-12-31 PROCEDURE — 85730 THROMBOPLASTIN TIME PARTIAL: CPT | Mod: 91

## 2020-12-31 PROCEDURE — 84484 ASSAY OF TROPONIN QUANT: CPT | Mod: 91

## 2020-12-31 PROCEDURE — 20000000 HC ICU ROOM

## 2020-12-31 PROCEDURE — 99900035 HC TECH TIME PER 15 MIN (STAT)

## 2020-12-31 PROCEDURE — 63600175 PHARM REV CODE 636 W HCPCS: Performed by: FAMILY MEDICINE

## 2020-12-31 PROCEDURE — 25500020 PHARM REV CODE 255: Performed by: SPECIALIST

## 2020-12-31 PROCEDURE — 83735 ASSAY OF MAGNESIUM: CPT

## 2020-12-31 PROCEDURE — 99153 MOD SED SAME PHYS/QHP EA: CPT | Performed by: SPECIALIST

## 2020-12-31 PROCEDURE — C1769 GUIDE WIRE: HCPCS | Performed by: SPECIALIST

## 2020-12-31 PROCEDURE — 99900031 HC PATIENT EDUCATION (STAT)

## 2020-12-31 PROCEDURE — A4216 STERILE WATER/SALINE, 10 ML: HCPCS | Performed by: SPECIALIST

## 2020-12-31 PROCEDURE — 87641 MR-STAPH DNA AMP PROBE: CPT

## 2020-12-31 PROCEDURE — 85730 THROMBOPLASTIN TIME PARTIAL: CPT

## 2020-12-31 PROCEDURE — 93306 TTE W/DOPPLER COMPLETE: CPT

## 2020-12-31 PROCEDURE — 80061 LIPID PANEL: CPT

## 2020-12-31 PROCEDURE — 99152 MOD SED SAME PHYS/QHP 5/>YRS: CPT | Performed by: SPECIALIST

## 2020-12-31 PROCEDURE — C1725 CATH, TRANSLUMIN NON-LASER: HCPCS | Performed by: SPECIALIST

## 2020-12-31 PROCEDURE — 86920 COMPATIBILITY TEST SPIN: CPT

## 2020-12-31 PROCEDURE — 25000003 PHARM REV CODE 250: Performed by: NURSE PRACTITIONER

## 2020-12-31 PROCEDURE — 93459 L HRT ART/GRFT ANGIO: CPT | Performed by: SPECIALIST

## 2020-12-31 PROCEDURE — 84484 ASSAY OF TROPONIN QUANT: CPT

## 2020-12-31 PROCEDURE — 94761 N-INVAS EAR/PLS OXIMETRY MLT: CPT

## 2020-12-31 PROCEDURE — 25500020 PHARM REV CODE 255: Performed by: FAMILY MEDICINE

## 2020-12-31 PROCEDURE — 80053 COMPREHEN METABOLIC PANEL: CPT

## 2020-12-31 PROCEDURE — 86900 BLOOD TYPING SEROLOGIC ABO: CPT

## 2020-12-31 PROCEDURE — 27000221 HC OXYGEN, UP TO 24 HOURS

## 2020-12-31 RX ORDER — MIDAZOLAM HYDROCHLORIDE 1 MG/ML
INJECTION INTRAMUSCULAR; INTRAVENOUS
Status: DISCONTINUED | OUTPATIENT
Start: 2020-12-31 | End: 2020-12-31 | Stop reason: HOSPADM

## 2020-12-31 RX ORDER — SODIUM CHLORIDE 450 MG/100ML
100 INJECTION, SOLUTION INTRAVENOUS CONTINUOUS
Status: DISCONTINUED | OUTPATIENT
Start: 2020-12-31 | End: 2021-01-01

## 2020-12-31 RX ORDER — SODIUM CHLORIDE 0.9 % (FLUSH) 0.9 %
2 SYRINGE (ML) INJECTION EVERY 6 HOURS
Status: DISCONTINUED | OUTPATIENT
Start: 2020-12-31 | End: 2021-01-11 | Stop reason: HOSPADM

## 2020-12-31 RX ORDER — ENOXAPARIN SODIUM 100 MG/ML
1 INJECTION SUBCUTANEOUS
Status: DISCONTINUED | OUTPATIENT
Start: 2020-12-31 | End: 2020-12-31

## 2020-12-31 RX ORDER — FENTANYL CITRATE 50 UG/ML
INJECTION, SOLUTION INTRAMUSCULAR; INTRAVENOUS
Status: DISCONTINUED | OUTPATIENT
Start: 2020-12-31 | End: 2020-12-31 | Stop reason: HOSPADM

## 2020-12-31 RX ORDER — HYDROCODONE BITARTRATE AND ACETAMINOPHEN 5; 325 MG/1; MG/1
1 TABLET ORAL EVERY 4 HOURS PRN
Status: DISCONTINUED | OUTPATIENT
Start: 2020-12-31 | End: 2021-01-01

## 2020-12-31 RX ORDER — SODIUM CHLORIDE 9 MG/ML
INJECTION, SOLUTION INTRAVENOUS
Status: DISCONTINUED | OUTPATIENT
Start: 2020-12-31 | End: 2021-01-01

## 2020-12-31 RX ORDER — LIDOCAINE HYDROCHLORIDE 10 MG/ML
INJECTION, SOLUTION EPIDURAL; INFILTRATION; INTRACAUDAL; PERINEURAL
Status: DISCONTINUED | OUTPATIENT
Start: 2020-12-31 | End: 2020-12-31 | Stop reason: HOSPADM

## 2020-12-31 RX ORDER — MUPIROCIN 20 MG/G
OINTMENT TOPICAL
Status: DISCONTINUED | OUTPATIENT
Start: 2020-12-31 | End: 2021-01-01

## 2020-12-31 RX ADMIN — DONEPEZIL HYDROCHLORIDE 10 MG: 5 TABLET, FILM COATED ORAL at 09:12

## 2020-12-31 RX ADMIN — SODIUM CHLORIDE, SODIUM LACTATE, POTASSIUM CHLORIDE, AND CALCIUM CHLORIDE 500 ML: .6; .31; .03; .02 INJECTION, SOLUTION INTRAVENOUS at 04:12

## 2020-12-31 RX ADMIN — BRIMONIDINE TARTRATE 1 DROP: 1.5 SOLUTION OPHTHALMIC at 06:12

## 2020-12-31 RX ADMIN — SODIUM CHLORIDE, PRESERVATIVE FREE 2 ML: 5 INJECTION INTRAVENOUS at 01:12

## 2020-12-31 RX ADMIN — ALLOPURINOL 100 MG: 100 TABLET ORAL at 05:12

## 2020-12-31 RX ADMIN — DILTIAZEM HYDROCHLORIDE 360 MG: 180 CAPSULE, COATED, EXTENDED RELEASE ORAL at 04:12

## 2020-12-31 RX ADMIN — LEVOTHYROXINE SODIUM 88 MCG: 88 TABLET ORAL at 05:12

## 2020-12-31 RX ADMIN — ENOXAPARIN SODIUM 70 MG: 80 INJECTION, SOLUTION INTRAVENOUS; SUBCUTANEOUS at 04:12

## 2020-12-31 RX ADMIN — ROSUVASTATIN CALCIUM 10 MG: 10 TABLET, FILM COATED ORAL at 05:12

## 2020-12-31 RX ADMIN — PANTOPRAZOLE SODIUM 40 MG: 40 TABLET, DELAYED RELEASE ORAL at 05:12

## 2020-12-31 RX ADMIN — LATANOPROST 1 DROP: 50 SOLUTION OPHTHALMIC at 10:12

## 2020-12-31 RX ADMIN — BRIMONIDINE TARTRATE 1 DROP: 1.5 SOLUTION OPHTHALMIC at 09:12

## 2020-12-31 RX ADMIN — SODIUM CHLORIDE, PRESERVATIVE FREE 2 ML: 5 INJECTION INTRAVENOUS at 06:12

## 2020-12-31 RX ADMIN — IOHEXOL 70 ML: 350 INJECTION, SOLUTION INTRAVENOUS at 03:12

## 2020-12-31 RX ADMIN — SODIUM CHLORIDE 100 ML/HR: 0.45 INJECTION, SOLUTION INTRAVENOUS at 04:12

## 2021-01-01 ENCOUNTER — ANESTHESIA (OUTPATIENT)
Dept: SURGERY | Facility: HOSPITAL | Age: 81
DRG: 234 | End: 2021-01-01
Payer: MEDICARE

## 2021-01-01 PROBLEM — Z95.1 S/P CABG (CORONARY ARTERY BYPASS GRAFT): Chronic | Status: ACTIVE | Noted: 2021-01-01

## 2021-01-01 LAB
ALLENS TEST: ABNORMAL
ANION GAP SERPL CALC-SCNC: 4 MMOL/L (ref 8–16)
ANION GAP SERPL CALC-SCNC: 4 MMOL/L (ref 8–16)
ANION GAP SERPL CALC-SCNC: 7 MMOL/L (ref 8–16)
ANION GAP SERPL CALC-SCNC: 7 MMOL/L (ref 8–16)
ANISOCYTOSIS BLD QL SMEAR: ABNORMAL
APTT PPP: 45.6 SEC (ref 23.6–33.3)
APTT PPP: 51.8 SEC (ref 23.6–33.3)
BASOPHILS # BLD AUTO: 0.31 K/UL (ref 0–0.2)
BASOPHILS NFR BLD: 0 % (ref 0–1.9)
BASOPHILS NFR BLD: 2.1 % (ref 0–1.9)
BLD PROD TYP BPU: NORMAL
BLD PROD TYP BPU: NORMAL
BLOOD UNIT EXPIRATION DATE: NORMAL
BLOOD UNIT EXPIRATION DATE: NORMAL
BLOOD UNIT TYPE CODE: 1700
BLOOD UNIT TYPE CODE: 1700
BLOOD UNIT TYPE: NORMAL
BLOOD UNIT TYPE: NORMAL
BUN SERPL-MCNC: 11 MG/DL (ref 8–23)
BUN SERPL-MCNC: 12 MG/DL (ref 8–23)
CALCIUM SERPL-MCNC: 7.5 MG/DL (ref 8.7–10.5)
CALCIUM SERPL-MCNC: 7.7 MG/DL (ref 8.7–10.5)
CALCIUM SERPL-MCNC: 8.7 MG/DL (ref 8.7–10.5)
CALCIUM SERPL-MCNC: 8.7 MG/DL (ref 8.7–10.5)
CHLORIDE SERPL-SCNC: 104 MMOL/L (ref 95–110)
CHLORIDE SERPL-SCNC: 104 MMOL/L (ref 95–110)
CHLORIDE SERPL-SCNC: 111 MMOL/L (ref 95–110)
CHLORIDE SERPL-SCNC: 112 MMOL/L (ref 95–110)
CO2 SERPL-SCNC: 21 MMOL/L (ref 23–29)
CODING SYSTEM: NORMAL
CODING SYSTEM: NORMAL
COLD ANTIBODY SCREEN: NORMAL
CREAT SERPL-MCNC: 0.8 MG/DL (ref 0.5–1.4)
CREAT SERPL-MCNC: 0.8 MG/DL (ref 0.5–1.4)
CREAT SERPL-MCNC: 0.9 MG/DL (ref 0.5–1.4)
CREAT SERPL-MCNC: 0.9 MG/DL (ref 0.5–1.4)
DELSYS: ABNORMAL
DIFFERENTIAL METHOD: ABNORMAL
DIFFERENTIAL METHOD: ABNORMAL
DISPENSE STATUS: NORMAL
DISPENSE STATUS: NORMAL
EOSINOPHIL # BLD AUTO: 0.3 K/UL (ref 0–0.5)
EOSINOPHIL NFR BLD: 0 % (ref 0–8)
EOSINOPHIL NFR BLD: 2.3 % (ref 0–8)
ERYTHROCYTE [DISTWIDTH] IN BLOOD BY AUTOMATED COUNT: 17.4 % (ref 11.5–14.5)
ERYTHROCYTE [DISTWIDTH] IN BLOOD BY AUTOMATED COUNT: 18.5 % (ref 11.5–14.5)
ERYTHROCYTE [DISTWIDTH] IN BLOOD BY AUTOMATED COUNT: 18.6 % (ref 11.5–14.5)
ERYTHROCYTE [SEDIMENTATION RATE] IN BLOOD BY WESTERGREN METHOD: 12 MM/H
ERYTHROCYTE [SEDIMENTATION RATE] IN BLOOD BY WESTERGREN METHOD: 16 MM/H
ERYTHROCYTE [SEDIMENTATION RATE] IN BLOOD BY WESTERGREN METHOD: 16 MM/H
ERYTHROCYTE [SEDIMENTATION RATE] IN BLOOD BY WESTERGREN METHOD: 21 MM/H
ERYTHROCYTE [SEDIMENTATION RATE] IN BLOOD BY WESTERGREN METHOD: 21 MM/H
EST. GFR  (AFRICAN AMERICAN): >60 ML/MIN/1.73 M^2
EST. GFR  (NON AFRICAN AMERICAN): >60 ML/MIN/1.73 M^2
ESTIMATED AVG GLUCOSE: 111 MG/DL (ref 68–131)
FERRITIN SERPL-MCNC: 5 NG/ML (ref 20–300)
FIO2: 100
FIO2: 30
FIO2: 30
FIO2: 40
FIO2: 5
GIANT PLATELETS BLD QL SMEAR: PRESENT
GLUCOSE SERPL-MCNC: 110 MG/DL (ref 70–110)
GLUCOSE SERPL-MCNC: 114 MG/DL (ref 70–110)
GLUCOSE SERPL-MCNC: 117 MG/DL (ref 70–110)
GLUCOSE SERPL-MCNC: 123 MG/DL (ref 70–110)
GLUCOSE SERPL-MCNC: 126 MG/DL (ref 70–110)
GLUCOSE SERPL-MCNC: 126 MG/DL (ref 70–110)
GLUCOSE SERPL-MCNC: 133 MG/DL (ref 70–110)
GLUCOSE SERPL-MCNC: 138 MG/DL (ref 70–110)
GLUCOSE SERPL-MCNC: 141 MG/DL (ref 70–110)
GLUCOSE SERPL-MCNC: 142 MG/DL (ref 70–110)
GLUCOSE SERPL-MCNC: 163 MG/DL (ref 70–110)
GLUCOSE SERPL-MCNC: 192 MG/DL (ref 70–110)
GLUCOSE SERPL-MCNC: 79 MG/DL (ref 70–110)
GLUCOSE SERPL-MCNC: 83 MG/DL (ref 70–110)
GLUCOSE SERPL-MCNC: 83 MG/DL (ref 70–110)
GLUCOSE SERPL-MCNC: 89 MG/DL (ref 70–110)
HBA1C MFR BLD HPLC: 5.5 % (ref 4.5–6.2)
HCO3 UR-SCNC: 17.7 MMOL/L (ref 24–28)
HCO3 UR-SCNC: 18.5 MMOL/L (ref 24–28)
HCO3 UR-SCNC: 18.9 MMOL/L (ref 24–28)
HCO3 UR-SCNC: 19 MMOL/L (ref 24–28)
HCO3 UR-SCNC: 20.4 MMOL/L (ref 24–28)
HCO3 UR-SCNC: 20.6 MMOL/L (ref 24–28)
HCO3 UR-SCNC: 21.3 MMOL/L (ref 24–28)
HCO3 UR-SCNC: 22.1 MMOL/L (ref 24–28)
HCO3 UR-SCNC: 22.1 MMOL/L (ref 24–28)
HCO3 UR-SCNC: 23.1 MMOL/L (ref 24–28)
HCT VFR BLD AUTO: 30.7 % (ref 40–54)
HCT VFR BLD AUTO: 31.2 % (ref 40–54)
HCT VFR BLD AUTO: 36.3 % (ref 40–54)
HCT VFR BLD CALC: 20 %PCV (ref 36–54)
HCT VFR BLD CALC: 23 %PCV (ref 36–54)
HCT VFR BLD CALC: 23 %PCV (ref 36–54)
HCT VFR BLD CALC: 29 %PCV (ref 36–54)
HCT VFR BLD CALC: 30 %PCV (ref 36–54)
HCT VFR BLD CALC: 30 %PCV (ref 36–54)
HCT VFR BLD CALC: 31 %PCV (ref 36–54)
HCT VFR BLD CALC: 32 %PCV (ref 36–54)
HCT VFR BLD CALC: 33 %PCV (ref 36–54)
HCT VFR BLD CALC: 33 %PCV (ref 36–54)
HGB BLD-MCNC: 10.5 G/DL (ref 14–18)
HGB BLD-MCNC: 9.1 G/DL (ref 14–18)
HGB BLD-MCNC: 9.3 G/DL (ref 14–18)
HYPOCHROMIA BLD QL SMEAR: ABNORMAL
IMM GRANULOCYTES # BLD AUTO: 0.12 K/UL (ref 0–0.04)
IMM GRANULOCYTES # BLD AUTO: ABNORMAL K/UL (ref 0–0.04)
IMM GRANULOCYTES NFR BLD AUTO: 0.8 % (ref 0–0.5)
IMM GRANULOCYTES NFR BLD AUTO: ABNORMAL % (ref 0–0.5)
INR PPP: 1.7
LYMPHOCYTES # BLD AUTO: 1.5 K/UL (ref 1–4.8)
LYMPHOCYTES NFR BLD: 0 % (ref 18–48)
LYMPHOCYTES NFR BLD: 10.5 % (ref 18–48)
MAGNESIUM SERPL-MCNC: 1.5 MG/DL (ref 1.6–2.6)
MCH RBC QN AUTO: 18.8 PG (ref 27–31)
MCH RBC QN AUTO: 19.9 PG (ref 27–31)
MCH RBC QN AUTO: 20 PG (ref 27–31)
MCHC RBC AUTO-ENTMCNC: 28.9 G/DL (ref 32–36)
MCHC RBC AUTO-ENTMCNC: 29.6 G/DL (ref 32–36)
MCHC RBC AUTO-ENTMCNC: 29.8 G/DL (ref 32–36)
MCV RBC AUTO: 65 FL (ref 82–98)
MCV RBC AUTO: 67 FL (ref 82–98)
MCV RBC AUTO: 67 FL (ref 82–98)
MIN VOL: 7.8
MIN VOL: 9
MODE: ABNORMAL
MONOCYTES # BLD AUTO: 1 K/UL (ref 0.3–1)
MONOCYTES NFR BLD: 1 % (ref 4–15)
MONOCYTES NFR BLD: 7 % (ref 4–15)
NEUTROPHILS # BLD AUTO: 11.3 K/UL (ref 1.8–7.7)
NEUTROPHILS NFR BLD: 77.3 % (ref 38–73)
NEUTROPHILS NFR BLD: 92 % (ref 38–73)
NEUTS BAND NFR BLD MANUAL: 7 %
NRBC BLD-RTO: 0 /100 WBC
NRBC BLD-RTO: 0 /100 WBC
NUM UNITS TRANS FFP: NORMAL
NUM UNITS TRANS FFP: NORMAL
OVALOCYTES BLD QL SMEAR: ABNORMAL
PCO2 BLDA: 26.6 MMHG (ref 35–45)
PCO2 BLDA: 31.5 MMHG (ref 35–45)
PCO2 BLDA: 34.2 MMHG (ref 35–45)
PCO2 BLDA: 35.7 MMHG (ref 35–45)
PCO2 BLDA: 36.6 MMHG (ref 35–45)
PCO2 BLDA: 37.4 MMHG (ref 35–45)
PCO2 BLDA: 40.3 MMHG (ref 35–45)
PCO2 BLDA: 41.4 MMHG (ref 35–45)
PCO2 BLDA: 43.2 MMHG (ref 35–45)
PCO2 BLDA: 43.6 MMHG (ref 35–45)
PEEP: 5
PH SMN: 7.26 [PH] (ref 7.35–7.45)
PH SMN: 7.31 [PH] (ref 7.35–7.45)
PH SMN: 7.31 [PH] (ref 7.35–7.45)
PH SMN: 7.34 [PH] (ref 7.35–7.45)
PH SMN: 7.35 [PH] (ref 7.35–7.45)
PH SMN: 7.36 [PH] (ref 7.35–7.45)
PH SMN: 7.36 [PH] (ref 7.35–7.45)
PH SMN: 7.39 [PH] (ref 7.35–7.45)
PH SMN: 7.4 [PH] (ref 7.35–7.45)
PH SMN: 7.43 [PH] (ref 7.35–7.45)
PIP: 19
PIP: 22
PLATELET # BLD AUTO: 555 K/UL (ref 150–350)
PLATELET # BLD AUTO: 615 K/UL (ref 150–350)
PLATELET # BLD AUTO: 797 K/UL (ref 150–350)
PLATELET BLD QL SMEAR: ABNORMAL
PMV BLD AUTO: 10.4 FL (ref 9.2–12.9)
PMV BLD AUTO: 10.4 FL (ref 9.2–12.9)
PMV BLD AUTO: 10.5 FL (ref 9.2–12.9)
PO2 BLDA: 114 MMHG (ref 80–100)
PO2 BLDA: 118 MMHG (ref 80–100)
PO2 BLDA: 345 MMHG (ref 80–100)
PO2 BLDA: 352 MMHG (ref 80–100)
PO2 BLDA: 469 MMHG (ref 80–100)
PO2 BLDA: 499 MMHG (ref 80–100)
PO2 BLDA: 509 MMHG (ref 80–100)
PO2 BLDA: 532 MMHG (ref 80–100)
PO2 BLDA: 81 MMHG (ref 80–100)
PO2 BLDA: 83 MMHG (ref 80–100)
POC ACTIVATED CLOTTING TIME K: 158 SEC (ref 74–137)
POC ACTIVATED CLOTTING TIME K: 164 SEC (ref 74–137)
POC ACTIVATED CLOTTING TIME K: 378 SEC (ref 74–137)
POC ACTIVATED CLOTTING TIME K: 494 SEC (ref 74–137)
POC ACTIVATED CLOTTING TIME K: 626 SEC (ref 74–137)
POC BE: -3 MMOL/L
POC BE: -3 MMOL/L
POC BE: -4 MMOL/L
POC BE: -4 MMOL/L
POC BE: -5 MMOL/L
POC BE: -6 MMOL/L
POC BE: -6 MMOL/L
POC BE: -7 MMOL/L
POC BE: -7 MMOL/L
POC BE: -9 MMOL/L
POC IONIZED CALCIUM: 1.01 MMOL/L (ref 1.06–1.42)
POC IONIZED CALCIUM: 1.04 MMOL/L (ref 1.06–1.42)
POC IONIZED CALCIUM: 1.1 MMOL/L (ref 1.06–1.42)
POC IONIZED CALCIUM: 1.11 MMOL/L (ref 1.06–1.42)
POC IONIZED CALCIUM: 1.13 MMOL/L (ref 1.06–1.42)
POC IONIZED CALCIUM: 1.13 MMOL/L (ref 1.06–1.42)
POC IONIZED CALCIUM: 1.15 MMOL/L (ref 1.06–1.42)
POC IONIZED CALCIUM: 1.18 MMOL/L (ref 1.06–1.42)
POC IONIZED CALCIUM: 1.23 MMOL/L (ref 1.06–1.42)
POC IONIZED CALCIUM: 1.24 MMOL/L (ref 1.06–1.42)
POC PCO2 TEMP: 26.8 MMHG
POC PH TEMP: 7.43
POC PO2 TEMP: 119 MMHG
POC SATURATED O2: 100 % (ref 95–100)
POC SATURATED O2: 94 % (ref 95–100)
POC SATURATED O2: 96 % (ref 95–100)
POC SATURATED O2: 98 % (ref 95–100)
POC SATURATED O2: 99 % (ref 95–100)
POC TCO2: 18 MMOL/L (ref 23–27)
POC TCO2: 20 MMOL/L (ref 23–27)
POC TCO2: 22 MMOL/L (ref 23–27)
POC TCO2: 23 MMOL/L (ref 23–27)
POC TCO2: 23 MMOL/L (ref 23–27)
POC TCO2: 24 MMOL/L (ref 23–27)
POC TEMPERATURE: ABNORMAL
POIKILOCYTOSIS BLD QL SMEAR: ABNORMAL
POTASSIUM BLD-SCNC: 3.1 MMOL/L (ref 3.5–5.1)
POTASSIUM BLD-SCNC: 3.5 MMOL/L (ref 3.5–5.1)
POTASSIUM BLD-SCNC: 3.8 MMOL/L (ref 3.5–5.1)
POTASSIUM BLD-SCNC: 3.9 MMOL/L (ref 3.5–5.1)
POTASSIUM BLD-SCNC: 4 MMOL/L (ref 3.5–5.1)
POTASSIUM BLD-SCNC: 4.1 MMOL/L (ref 3.5–5.1)
POTASSIUM BLD-SCNC: 4.3 MMOL/L (ref 3.5–5.1)
POTASSIUM BLD-SCNC: 4.5 MMOL/L (ref 3.5–5.1)
POTASSIUM SERPL-SCNC: 3.7 MMOL/L (ref 3.5–5.1)
POTASSIUM SERPL-SCNC: 4 MMOL/L (ref 3.5–5.1)
POTASSIUM SERPL-SCNC: 4 MMOL/L (ref 3.5–5.1)
POTASSIUM SERPL-SCNC: 4.2 MMOL/L (ref 3.5–5.1)
PROTHROMBIN TIME: 19.6 SEC (ref 10.6–14.8)
RBC # BLD AUTO: 4.56 M/UL (ref 4.6–6.2)
RBC # BLD AUTO: 4.67 M/UL (ref 4.6–6.2)
RBC # BLD AUTO: 5.59 M/UL (ref 4.6–6.2)
SAMPLE: ABNORMAL
SCHISTOCYTES BLD QL SMEAR: PRESENT
SITE: ABNORMAL
SODIUM BLD-SCNC: 135 MMOL/L (ref 136–145)
SODIUM BLD-SCNC: 136 MMOL/L (ref 136–145)
SODIUM BLD-SCNC: 137 MMOL/L (ref 136–145)
SODIUM BLD-SCNC: 138 MMOL/L (ref 136–145)
SODIUM BLD-SCNC: 138 MMOL/L (ref 136–145)
SODIUM BLD-SCNC: 141 MMOL/L (ref 136–145)
SODIUM BLD-SCNC: 141 MMOL/L (ref 136–145)
SODIUM BLD-SCNC: 142 MMOL/L (ref 136–145)
SODIUM SERPL-SCNC: 132 MMOL/L (ref 136–145)
SODIUM SERPL-SCNC: 132 MMOL/L (ref 136–145)
SODIUM SERPL-SCNC: 136 MMOL/L (ref 136–145)
SODIUM SERPL-SCNC: 137 MMOL/L (ref 136–145)
SP02: 97
SP02: 98
VT: 550
WBC # BLD AUTO: 14.62 K/UL (ref 3.9–12.7)
WBC # BLD AUTO: 19.79 K/UL (ref 3.9–12.7)
WBC # BLD AUTO: 24.18 K/UL (ref 3.9–12.7)

## 2021-01-01 PROCEDURE — 63600175 PHARM REV CODE 636 W HCPCS: Performed by: NURSE PRACTITIONER

## 2021-01-01 PROCEDURE — 93005 ELECTROCARDIOGRAM TRACING: CPT | Performed by: INTERNAL MEDICINE

## 2021-01-01 PROCEDURE — 25000003 PHARM REV CODE 250: Performed by: THORACIC SURGERY (CARDIOTHORACIC VASCULAR SURGERY)

## 2021-01-01 PROCEDURE — 84132 ASSAY OF SERUM POTASSIUM: CPT

## 2021-01-01 PROCEDURE — 27201423 OPTIME MED/SURG SUP & DEVICES STERILE SUPPLY: Performed by: THORACIC SURGERY (CARDIOTHORACIC VASCULAR SURGERY)

## 2021-01-01 PROCEDURE — 21000000 HC CCU ICU ROOM CHARGE

## 2021-01-01 PROCEDURE — A4216 STERILE WATER/SALINE, 10 ML: HCPCS | Performed by: SPECIALIST

## 2021-01-01 PROCEDURE — 85014 HEMATOCRIT: CPT

## 2021-01-01 PROCEDURE — 36000713 HC OR TIME LEV V EA ADD 15 MIN: Performed by: THORACIC SURGERY (CARDIOTHORACIC VASCULAR SURGERY)

## 2021-01-01 PROCEDURE — 82728 ASSAY OF FERRITIN: CPT

## 2021-01-01 PROCEDURE — 27000221 HC OXYGEN, UP TO 24 HOURS

## 2021-01-01 PROCEDURE — 63600175 PHARM REV CODE 636 W HCPCS: Performed by: NURSE ANESTHETIST, CERTIFIED REGISTERED

## 2021-01-01 PROCEDURE — 93010 ELECTROCARDIOGRAM REPORT: CPT | Mod: ,,, | Performed by: INTERNAL MEDICINE

## 2021-01-01 PROCEDURE — P9017 PLASMA 1 DONOR FRZ W/IN 8 HR: HCPCS

## 2021-01-01 PROCEDURE — 25000003 PHARM REV CODE 250: Performed by: NURSE ANESTHETIST, CERTIFIED REGISTERED

## 2021-01-01 PROCEDURE — 85027 COMPLETE CBC AUTOMATED: CPT

## 2021-01-01 PROCEDURE — 85025 COMPLETE CBC W/AUTO DIFF WBC: CPT

## 2021-01-01 PROCEDURE — 63600175 PHARM REV CODE 636 W HCPCS

## 2021-01-01 PROCEDURE — 94761 N-INVAS EAR/PLS OXIMETRY MLT: CPT

## 2021-01-01 PROCEDURE — 20000000 HC ICU ROOM

## 2021-01-01 PROCEDURE — 82803 BLOOD GASES ANY COMBINATION: CPT

## 2021-01-01 PROCEDURE — 93010 ELECTROCARDIOGRAM REPORT: CPT | Mod: 76,,, | Performed by: INTERNAL MEDICINE

## 2021-01-01 PROCEDURE — 37000008 HC ANESTHESIA 1ST 15 MINUTES: Performed by: THORACIC SURGERY (CARDIOTHORACIC VASCULAR SURGERY)

## 2021-01-01 PROCEDURE — 37000009 HC ANESTHESIA EA ADD 15 MINS: Performed by: THORACIC SURGERY (CARDIOTHORACIC VASCULAR SURGERY)

## 2021-01-01 PROCEDURE — 93010 EKG 12-LEAD: ICD-10-PCS | Mod: 76,,, | Performed by: INTERNAL MEDICINE

## 2021-01-01 PROCEDURE — 85730 THROMBOPLASTIN TIME PARTIAL: CPT | Mod: 91

## 2021-01-01 PROCEDURE — 36000712 HC OR TIME LEV V 1ST 15 MIN: Performed by: THORACIC SURGERY (CARDIOTHORACIC VASCULAR SURGERY)

## 2021-01-01 PROCEDURE — S0017 INJECTION, AMINOCAPROIC ACID: HCPCS

## 2021-01-01 PROCEDURE — 37799 UNLISTED PX VASCULAR SURGERY: CPT

## 2021-01-01 PROCEDURE — 36430 TRANSFUSION BLD/BLD COMPNT: CPT

## 2021-01-01 PROCEDURE — 99900026 HC AIRWAY MAINTENANCE (STAT)

## 2021-01-01 PROCEDURE — 25000003 PHARM REV CODE 250: Performed by: SPECIALIST

## 2021-01-01 PROCEDURE — 80048 BASIC METABOLIC PNL TOTAL CA: CPT

## 2021-01-01 PROCEDURE — P9047 ALBUMIN (HUMAN), 25%, 50ML: HCPCS | Mod: JG

## 2021-01-01 PROCEDURE — 85610 PROTHROMBIN TIME: CPT

## 2021-01-01 PROCEDURE — 82330 ASSAY OF CALCIUM: CPT

## 2021-01-01 PROCEDURE — 99900031 HC PATIENT EDUCATION (STAT)

## 2021-01-01 PROCEDURE — 80048 BASIC METABOLIC PNL TOTAL CA: CPT | Mod: 91

## 2021-01-01 PROCEDURE — 85027 COMPLETE CBC AUTOMATED: CPT | Mod: 91

## 2021-01-01 PROCEDURE — 27000080 OPTIME MED/SURG SUP & DEVICES GENERAL CLASSIFICATION: Performed by: THORACIC SURGERY (CARDIOTHORACIC VASCULAR SURGERY)

## 2021-01-01 PROCEDURE — 84295 ASSAY OF SERUM SODIUM: CPT

## 2021-01-01 PROCEDURE — 25000003 PHARM REV CODE 250

## 2021-01-01 PROCEDURE — 63600175 PHARM REV CODE 636 W HCPCS: Performed by: THORACIC SURGERY (CARDIOTHORACIC VASCULAR SURGERY)

## 2021-01-01 PROCEDURE — 25000003 PHARM REV CODE 250: Performed by: NURSE PRACTITIONER

## 2021-01-01 PROCEDURE — C1729 CATH, DRAINAGE: HCPCS | Performed by: THORACIC SURGERY (CARDIOTHORACIC VASCULAR SURGERY)

## 2021-01-01 PROCEDURE — 99900035 HC TECH TIME PER 15 MIN (STAT)

## 2021-01-01 PROCEDURE — 83735 ASSAY OF MAGNESIUM: CPT

## 2021-01-01 PROCEDURE — 85007 BL SMEAR W/DIFF WBC COUNT: CPT

## 2021-01-01 PROCEDURE — 94002 VENT MGMT INPAT INIT DAY: CPT

## 2021-01-01 PROCEDURE — 85730 THROMBOPLASTIN TIME PARTIAL: CPT

## 2021-01-01 PROCEDURE — P9016 RBC LEUKOCYTES REDUCED: HCPCS

## 2021-01-01 RX ORDER — ONDANSETRON 2 MG/ML
4 INJECTION INTRAMUSCULAR; INTRAVENOUS EVERY 12 HOURS PRN
Status: DISCONTINUED | OUTPATIENT
Start: 2021-01-01 | End: 2021-01-11 | Stop reason: HOSPADM

## 2021-01-01 RX ORDER — ASPIRIN 325 MG
325 TABLET ORAL DAILY
Status: DISCONTINUED | OUTPATIENT
Start: 2021-01-01 | End: 2021-01-11 | Stop reason: HOSPADM

## 2021-01-01 RX ORDER — HYDROCODONE BITARTRATE AND ACETAMINOPHEN 5; 325 MG/1; MG/1
1 TABLET ORAL EVERY 4 HOURS PRN
Status: DISCONTINUED | OUTPATIENT
Start: 2021-01-01 | End: 2021-01-11 | Stop reason: HOSPADM

## 2021-01-01 RX ORDER — MIDAZOLAM HYDROCHLORIDE 1 MG/ML
1 INJECTION INTRAMUSCULAR; INTRAVENOUS
Status: DISCONTINUED | OUTPATIENT
Start: 2021-01-01 | End: 2021-01-02

## 2021-01-01 RX ORDER — PHENYLEPHRINE HYDROCHLORIDE 10 MG/ML
INJECTION INTRAVENOUS
Status: DISCONTINUED | OUTPATIENT
Start: 2021-01-01 | End: 2021-01-01

## 2021-01-01 RX ORDER — CEFAZOLIN SODIUM 2 G/50ML
2 SOLUTION INTRAVENOUS
Status: COMPLETED | OUTPATIENT
Start: 2021-01-01 | End: 2021-01-02

## 2021-01-01 RX ORDER — CEFAZOLIN SODIUM 1 G/3ML
INJECTION, POWDER, FOR SOLUTION INTRAMUSCULAR; INTRAVENOUS
Status: DISCONTINUED | OUTPATIENT
Start: 2021-01-01 | End: 2021-01-01

## 2021-01-01 RX ORDER — MAGNESIUM SULFATE HEPTAHYDRATE 40 MG/ML
2 INJECTION, SOLUTION INTRAVENOUS
Status: DISCONTINUED | OUTPATIENT
Start: 2021-01-01 | End: 2021-01-04

## 2021-01-01 RX ORDER — FENTANYL CITRATE 50 UG/ML
INJECTION, SOLUTION INTRAMUSCULAR; INTRAVENOUS
Status: DISCONTINUED | OUTPATIENT
Start: 2021-01-01 | End: 2021-01-01

## 2021-01-01 RX ORDER — LIDOCAINE HYDROCHLORIDE 20 MG/ML
INJECTION, SOLUTION EPIDURAL; INFILTRATION; INTRACAUDAL; PERINEURAL
Status: DISCONTINUED | OUTPATIENT
Start: 2021-01-01 | End: 2021-01-01

## 2021-01-01 RX ORDER — PROTAMINE SULFATE 10 MG/ML
INJECTION, SOLUTION INTRAVENOUS
Status: DISCONTINUED | OUTPATIENT
Start: 2021-01-01 | End: 2021-01-01

## 2021-01-01 RX ORDER — MIDAZOLAM HYDROCHLORIDE 1 MG/ML
INJECTION INTRAMUSCULAR; INTRAVENOUS
Status: DISCONTINUED | OUTPATIENT
Start: 2021-01-01 | End: 2021-01-01

## 2021-01-01 RX ORDER — HEPARIN SODIUM 10000 [USP'U]/ML
INJECTION, SOLUTION INTRAVENOUS; SUBCUTANEOUS
Status: DISCONTINUED | OUTPATIENT
Start: 2021-01-01 | End: 2021-01-01

## 2021-01-01 RX ORDER — CHLORHEXIDINE GLUCONATE ORAL RINSE 1.2 MG/ML
10 SOLUTION DENTAL 2 TIMES DAILY
Status: DISCONTINUED | OUTPATIENT
Start: 2021-01-01 | End: 2021-01-06

## 2021-01-01 RX ORDER — SODIUM CHLORIDE 9 MG/ML
75 INJECTION, SOLUTION INTRAVENOUS CONTINUOUS
Status: DISCONTINUED | OUTPATIENT
Start: 2021-01-01 | End: 2021-01-04

## 2021-01-01 RX ORDER — EPHEDRINE SULFATE 50 MG/ML
INJECTION, SOLUTION INTRAVENOUS
Status: DISCONTINUED | OUTPATIENT
Start: 2021-01-01 | End: 2021-01-01

## 2021-01-01 RX ORDER — FAMOTIDINE 10 MG/ML
INJECTION INTRAVENOUS
Status: DISCONTINUED | OUTPATIENT
Start: 2021-01-01 | End: 2021-01-01

## 2021-01-01 RX ORDER — LIDOCAINE HYDROCHLORIDE 20 MG/ML
JELLY TOPICAL
Status: DISCONTINUED | OUTPATIENT
Start: 2021-01-01 | End: 2021-01-01

## 2021-01-01 RX ORDER — MORPHINE SULFATE 2 MG/ML
2 INJECTION, SOLUTION INTRAMUSCULAR; INTRAVENOUS
Status: DISCONTINUED | OUTPATIENT
Start: 2021-01-01 | End: 2021-01-04

## 2021-01-01 RX ORDER — SODIUM CHLORIDE, SODIUM LACTATE, POTASSIUM CHLORIDE, CALCIUM CHLORIDE 600; 310; 30; 20 MG/100ML; MG/100ML; MG/100ML; MG/100ML
INJECTION, SOLUTION INTRAVENOUS CONTINUOUS PRN
Status: DISCONTINUED | OUTPATIENT
Start: 2021-01-01 | End: 2021-01-01

## 2021-01-01 RX ORDER — SODIUM CHLORIDE 9 MG/ML
INJECTION, SOLUTION INTRAVENOUS CONTINUOUS PRN
Status: DISCONTINUED | OUTPATIENT
Start: 2021-01-01 | End: 2021-01-01

## 2021-01-01 RX ORDER — POTASSIUM CHLORIDE 750 MG/1
10 CAPSULE, EXTENDED RELEASE ORAL EVERY 12 HOURS
Status: DISCONTINUED | OUTPATIENT
Start: 2021-01-01 | End: 2021-01-05

## 2021-01-01 RX ORDER — MUPIROCIN 20 MG/G
1 OINTMENT TOPICAL 2 TIMES DAILY
Status: COMPLETED | OUTPATIENT
Start: 2021-01-01 | End: 2021-01-06

## 2021-01-01 RX ORDER — VECURONIUM BROMIDE FOR INJECTION 1 MG/ML
INJECTION, POWDER, LYOPHILIZED, FOR SOLUTION INTRAVENOUS
Status: DISCONTINUED | OUTPATIENT
Start: 2021-01-01 | End: 2021-01-01

## 2021-01-01 RX ORDER — ROCURONIUM BROMIDE 10 MG/ML
INJECTION, SOLUTION INTRAVENOUS
Status: DISCONTINUED | OUTPATIENT
Start: 2021-01-01 | End: 2021-01-01

## 2021-01-01 RX ORDER — HYDROCODONE BITARTRATE AND ACETAMINOPHEN 7.5; 325 MG/1; MG/1
1 TABLET ORAL EVERY 4 HOURS PRN
Status: DISCONTINUED | OUTPATIENT
Start: 2021-01-01 | End: 2021-01-06

## 2021-01-01 RX ORDER — POTASSIUM CHLORIDE 29.8 MG/ML
40 INJECTION INTRAVENOUS
Status: DISCONTINUED | OUTPATIENT
Start: 2021-01-01 | End: 2021-01-04

## 2021-01-01 RX ORDER — FAMOTIDINE 10 MG/ML
20 INJECTION INTRAVENOUS 2 TIMES DAILY
Status: DISCONTINUED | OUTPATIENT
Start: 2021-01-01 | End: 2021-01-03

## 2021-01-01 RX ORDER — POTASSIUM CHLORIDE 14.9 MG/ML
20 INJECTION INTRAVENOUS
Status: DISCONTINUED | OUTPATIENT
Start: 2021-01-01 | End: 2021-01-04

## 2021-01-01 RX ORDER — ETOMIDATE 2 MG/ML
INJECTION INTRAVENOUS
Status: DISCONTINUED | OUTPATIENT
Start: 2021-01-01 | End: 2021-01-01

## 2021-01-01 RX ORDER — DOCUSATE SODIUM 100 MG/1
100 CAPSULE, LIQUID FILLED ORAL 2 TIMES DAILY
Status: DISCONTINUED | OUTPATIENT
Start: 2021-01-01 | End: 2021-01-11 | Stop reason: HOSPADM

## 2021-01-01 RX ADMIN — PHENYLEPHRINE HYDROCHLORIDE 100 MCG: 10 INJECTION INTRAVENOUS at 09:01

## 2021-01-01 RX ADMIN — LIDOCAINE HYDROCHLORIDE 4 ML: 20 JELLY TOPICAL at 08:01

## 2021-01-01 RX ADMIN — FENTANYL CITRATE 250 MCG: 50 INJECTION INTRAMUSCULAR; INTRAVENOUS at 11:01

## 2021-01-01 RX ADMIN — ONDANSETRON 4 MG: 2 INJECTION INTRAMUSCULAR; INTRAVENOUS at 07:01

## 2021-01-01 RX ADMIN — FAMOTIDINE 20 MG: 10 INJECTION INTRAVENOUS at 09:01

## 2021-01-01 RX ADMIN — FENTANYL CITRATE 250 MCG: 50 INJECTION INTRAMUSCULAR; INTRAVENOUS at 10:01

## 2021-01-01 RX ADMIN — SODIUM CHLORIDE: 0.9 INJECTION, SOLUTION INTRAVENOUS at 10:01

## 2021-01-01 RX ADMIN — BRIMONIDINE TARTRATE 1 DROP: 1.5 SOLUTION OPHTHALMIC at 09:01

## 2021-01-01 RX ADMIN — MIDAZOLAM HYDROCHLORIDE 2 MG: 1 INJECTION, SOLUTION INTRAMUSCULAR; INTRAVENOUS at 01:01

## 2021-01-01 RX ADMIN — PANTOPRAZOLE SODIUM 40 MG: 40 TABLET, DELAYED RELEASE ORAL at 06:01

## 2021-01-01 RX ADMIN — VECURONIUM BROMIDE 5 MG: 1 INJECTION, POWDER, LYOPHILIZED, FOR SOLUTION INTRAVENOUS at 10:01

## 2021-01-01 RX ADMIN — FENTANYL CITRATE 250 MCG: 50 INJECTION INTRAMUSCULAR; INTRAVENOUS at 08:01

## 2021-01-01 RX ADMIN — EPHEDRINE SULFATE 5 MG: 50 INJECTION, SOLUTION INTRAVENOUS at 09:01

## 2021-01-01 RX ADMIN — POTASSIUM CHLORIDE 40 MEQ: 29.8 INJECTION, SOLUTION INTRAVENOUS at 04:01

## 2021-01-01 RX ADMIN — MUPIROCIN 1 G: 20 OINTMENT TOPICAL at 09:01

## 2021-01-01 RX ADMIN — FAMOTIDINE 20 MG: 10 INJECTION, SOLUTION INTRAVENOUS at 07:01

## 2021-01-01 RX ADMIN — HEPARIN SODIUM 30000 UNITS: 10000 INJECTION, SOLUTION INTRAVENOUS; SUBCUTANEOUS at 11:01

## 2021-01-01 RX ADMIN — CEFAZOLIN 2 G: 330 INJECTION, POWDER, FOR SOLUTION INTRAMUSCULAR; INTRAVENOUS at 09:01

## 2021-01-01 RX ADMIN — PHENYLEPHRINE HYDROCHLORIDE 200 MCG: 10 INJECTION INTRAVENOUS at 10:01

## 2021-01-01 RX ADMIN — FENTANYL CITRATE 250 MCG: 50 INJECTION INTRAMUSCULAR; INTRAVENOUS at 09:01

## 2021-01-01 RX ADMIN — MIDAZOLAM HYDROCHLORIDE 2 MG: 1 INJECTION, SOLUTION INTRAMUSCULAR; INTRAVENOUS at 07:01

## 2021-01-01 RX ADMIN — FENTANYL CITRATE 500 MCG: 50 INJECTION INTRAMUSCULAR; INTRAVENOUS at 11:01

## 2021-01-01 RX ADMIN — CALCIUM GLUCONATE 1 G: 98 INJECTION, SOLUTION INTRAVENOUS at 05:01

## 2021-01-01 RX ADMIN — MIDAZOLAM HYDROCHLORIDE 3 MG: 1 INJECTION, SOLUTION INTRAMUSCULAR; INTRAVENOUS at 11:01

## 2021-01-01 RX ADMIN — SODIUM CHLORIDE 1 UNITS: 9 INJECTION, SOLUTION INTRAVENOUS at 11:01

## 2021-01-01 RX ADMIN — PHENYLEPHRINE HYDROCHLORIDE 100 MCG: 10 INJECTION INTRAVENOUS at 01:01

## 2021-01-01 RX ADMIN — LEVOTHYROXINE SODIUM 88 MCG: 88 TABLET ORAL at 06:01

## 2021-01-01 RX ADMIN — SODIUM CHLORIDE, SODIUM LACTATE, POTASSIUM CHLORIDE, AND CALCIUM CHLORIDE: .6; .31; .03; .02 INJECTION, SOLUTION INTRAVENOUS at 07:01

## 2021-01-01 RX ADMIN — PHENYLEPHRINE HYDROCHLORIDE 100 MCG: 10 INJECTION INTRAVENOUS at 10:01

## 2021-01-01 RX ADMIN — CHLORHEXIDINE GLUCONATE 10 ML: 1.2 RINSE ORAL at 09:01

## 2021-01-01 RX ADMIN — MIDAZOLAM HYDROCHLORIDE 2 MG: 1 INJECTION, SOLUTION INTRAMUSCULAR; INTRAVENOUS at 08:01

## 2021-01-01 RX ADMIN — MAGNESIUM SULFATE IN WATER 2 G: 40 INJECTION, SOLUTION INTRAVENOUS at 09:01

## 2021-01-01 RX ADMIN — SODIUM CHLORIDE, PRESERVATIVE FREE 2 ML: 5 INJECTION INTRAVENOUS at 06:01

## 2021-01-01 RX ADMIN — LIDOCAINE HYDROCHLORIDE 100 MG: 20 INJECTION, SOLUTION EPIDURAL; INFILTRATION; INTRACAUDAL; PERINEURAL at 08:01

## 2021-01-01 RX ADMIN — VECURONIUM BROMIDE 10 MG: 1 INJECTION, POWDER, LYOPHILIZED, FOR SOLUTION INTRAVENOUS at 11:01

## 2021-01-01 RX ADMIN — LATANOPROST 1 DROP: 50 SOLUTION OPHTHALMIC at 09:01

## 2021-01-01 RX ADMIN — VECURONIUM BROMIDE 5 MG: 1 INJECTION, POWDER, LYOPHILIZED, FOR SOLUTION INTRAVENOUS at 09:01

## 2021-01-01 RX ADMIN — SODIUM CHLORIDE: 0.9 INJECTION, SOLUTION INTRAVENOUS at 07:01

## 2021-01-01 RX ADMIN — EPHEDRINE SULFATE 5 MG: 50 INJECTION, SOLUTION INTRAVENOUS at 10:01

## 2021-01-01 RX ADMIN — SODIUM CHLORIDE, PRESERVATIVE FREE 2 ML: 5 INJECTION INTRAVENOUS at 12:01

## 2021-01-01 RX ADMIN — SODIUM CHLORIDE 75 ML/HR: 0.9 INJECTION, SOLUTION INTRAVENOUS at 03:01

## 2021-01-01 RX ADMIN — MIDAZOLAM HYDROCHLORIDE 2 MG: 1 INJECTION, SOLUTION INTRAMUSCULAR; INTRAVENOUS at 10:01

## 2021-01-01 RX ADMIN — SODIUM CHLORIDE: 0.9 INJECTION, SOLUTION INTRAVENOUS at 09:01

## 2021-01-01 RX ADMIN — FENTANYL CITRATE 250 MCG: 50 INJECTION INTRAMUSCULAR; INTRAVENOUS at 07:01

## 2021-01-01 RX ADMIN — PROTAMINE SULFATE 350 MG: 10 INJECTION, SOLUTION INTRAVENOUS at 01:01

## 2021-01-01 RX ADMIN — ETOMIDATE 20 MG: 2 INJECTION, SOLUTION INTRAVENOUS at 08:01

## 2021-01-01 RX ADMIN — CEFAZOLIN SODIUM 2 G: 2 SOLUTION INTRAVENOUS at 04:01

## 2021-01-01 RX ADMIN — ROCURONIUM BROMIDE 50 MG: 10 INJECTION, SOLUTION INTRAVENOUS at 08:01

## 2021-01-01 RX ADMIN — MIDAZOLAM HYDROCHLORIDE 1 MG: 1 INJECTION, SOLUTION INTRAMUSCULAR; INTRAVENOUS at 07:01

## 2021-01-01 RX ADMIN — MIDAZOLAM HYDROCHLORIDE 3 MG: 1 INJECTION, SOLUTION INTRAMUSCULAR; INTRAVENOUS at 12:01

## 2021-01-02 LAB
ALLENS TEST: ABNORMAL
ANION GAP SERPL CALC-SCNC: 5 MMOL/L (ref 8–16)
ANISOCYTOSIS BLD QL SMEAR: ABNORMAL
APTT PPP: 44.3 SEC (ref 23.6–33.3)
BASOPHILS # BLD AUTO: 0.06 K/UL (ref 0–0.2)
BASOPHILS NFR BLD: 0 % (ref 0–1.9)
BASOPHILS NFR BLD: 0.3 % (ref 0–1.9)
BLD PROD TYP BPU: NORMAL
BLD PROD TYP BPU: NORMAL
BLOOD UNIT EXPIRATION DATE: NORMAL
BLOOD UNIT EXPIRATION DATE: NORMAL
BLOOD UNIT TYPE CODE: 1700
BLOOD UNIT TYPE CODE: 1700
BLOOD UNIT TYPE: NORMAL
BLOOD UNIT TYPE: NORMAL
BUN SERPL-MCNC: 14 MG/DL (ref 8–23)
CALCIUM SERPL-MCNC: 7.8 MG/DL (ref 8.7–10.5)
CHLORIDE SERPL-SCNC: 115 MMOL/L (ref 95–110)
CO2 SERPL-SCNC: 18 MMOL/L (ref 23–29)
CODING SYSTEM: NORMAL
CODING SYSTEM: NORMAL
CREAT SERPL-MCNC: 0.9 MG/DL (ref 0.5–1.4)
DELSYS: ABNORMAL
DIFFERENTIAL METHOD: ABNORMAL
DIFFERENTIAL METHOD: ABNORMAL
DISPENSE STATUS: NORMAL
DISPENSE STATUS: NORMAL
EOSINOPHIL # BLD AUTO: 0 K/UL (ref 0–0.5)
EOSINOPHIL NFR BLD: 0 % (ref 0–8)
EOSINOPHIL NFR BLD: 0 % (ref 0–8)
ERYTHROCYTE [DISTWIDTH] IN BLOOD BY AUTOMATED COUNT: 18.6 % (ref 11.5–14.5)
ERYTHROCYTE [DISTWIDTH] IN BLOOD BY AUTOMATED COUNT: 20.1 % (ref 11.5–14.5)
ERYTHROCYTE [SEDIMENTATION RATE] IN BLOOD BY WESTERGREN METHOD: 16 MM/H
ERYTHROCYTE [SEDIMENTATION RATE] IN BLOOD BY WESTERGREN METHOD: 16 MM/H
ERYTHROCYTE [SEDIMENTATION RATE] IN BLOOD BY WESTERGREN METHOD: 22 MM/H
EST. GFR  (AFRICAN AMERICAN): >60 ML/MIN/1.73 M^2
EST. GFR  (NON AFRICAN AMERICAN): >60 ML/MIN/1.73 M^2
FIBRINOGEN PPP-MCNC: 264 MG/DL (ref 162–449)
FIO2: 25
FIO2: 25
FIO2: 30
GIANT PLATELETS BLD QL SMEAR: PRESENT
GLUCOSE SERPL-MCNC: 138 MG/DL (ref 70–110)
GLUCOSE SERPL-MCNC: 140 MG/DL (ref 70–110)
GLUCOSE SERPL-MCNC: 150 MG/DL (ref 70–110)
GLUCOSE SERPL-MCNC: 153 MG/DL (ref 70–110)
GLUCOSE SERPL-MCNC: 157 MG/DL (ref 70–110)
GLUCOSE SERPL-MCNC: 159 MG/DL (ref 70–110)
GLUCOSE SERPL-MCNC: 160 MG/DL (ref 70–110)
GLUCOSE SERPL-MCNC: 161 MG/DL (ref 70–110)
GLUCOSE SERPL-MCNC: 176 MG/DL (ref 70–110)
GLUCOSE SERPL-MCNC: 177 MG/DL (ref 70–110)
HCO3 UR-SCNC: 17.8 MMOL/L (ref 24–28)
HCO3 UR-SCNC: 18.1 MMOL/L (ref 24–28)
HCO3 UR-SCNC: 18.2 MMOL/L (ref 24–28)
HCO3 UR-SCNC: 19.2 MMOL/L (ref 24–28)
HCT VFR BLD AUTO: 25.1 % (ref 40–54)
HCT VFR BLD AUTO: 27.5 % (ref 40–54)
HCT VFR BLD CALC: 27 %PCV (ref 36–54)
HCT VFR BLD CALC: 30 %PCV (ref 36–54)
HCT VFR BLD CALC: 31 %PCV (ref 36–54)
HCT VFR BLD CALC: 31 %PCV (ref 36–54)
HGB BLD-MCNC: 7.4 G/DL (ref 14–18)
HGB BLD-MCNC: 8.4 G/DL (ref 14–18)
IMM GRANULOCYTES # BLD AUTO: 0.31 K/UL (ref 0–0.04)
IMM GRANULOCYTES # BLD AUTO: ABNORMAL K/UL (ref 0–0.04)
IMM GRANULOCYTES NFR BLD AUTO: 1.5 % (ref 0–0.5)
IMM GRANULOCYTES NFR BLD AUTO: ABNORMAL % (ref 0–0.5)
INR PPP: 1.6
LYMPHOCYTES # BLD AUTO: 0.7 K/UL (ref 1–4.8)
LYMPHOCYTES NFR BLD: 3.2 % (ref 18–48)
LYMPHOCYTES NFR BLD: 5 % (ref 18–48)
MAGNESIUM SERPL-MCNC: 2.1 MG/DL (ref 1.6–2.6)
MCH RBC QN AUTO: 19.6 PG (ref 27–31)
MCH RBC QN AUTO: 20.3 PG (ref 27–31)
MCHC RBC AUTO-ENTMCNC: 29.5 G/DL (ref 32–36)
MCHC RBC AUTO-ENTMCNC: 30.5 G/DL (ref 32–36)
MCV RBC AUTO: 67 FL (ref 82–98)
MCV RBC AUTO: 67 FL (ref 82–98)
MIN VOL: 10
MIN VOL: 9
MODE: ABNORMAL
MONOCYTES # BLD AUTO: 0.5 K/UL (ref 0.3–1)
MONOCYTES NFR BLD: 2.1 % (ref 4–15)
MONOCYTES NFR BLD: 3 % (ref 4–15)
NEUTROPHILS # BLD AUTO: 19.6 K/UL (ref 1.8–7.7)
NEUTROPHILS NFR BLD: 84 % (ref 38–73)
NEUTROPHILS NFR BLD: 92.9 % (ref 38–73)
NEUTS BAND NFR BLD MANUAL: 8 %
NRBC BLD-RTO: 0 /100 WBC
NRBC BLD-RTO: 0 /100 WBC
NUM UNITS TRANS PACKED RBC: NORMAL
NUM UNITS TRANS PACKED RBC: NORMAL
PCO2 BLDA: 29.1 MMHG (ref 35–45)
PCO2 BLDA: 33.5 MMHG (ref 35–45)
PCO2 BLDA: 33.5 MMHG (ref 35–45)
PCO2 BLDA: 35.9 MMHG (ref 35–45)
PEEP: 5
PEEP: 5
PH SMN: 7.33 [PH] (ref 7.35–7.45)
PH SMN: 7.34 [PH] (ref 7.35–7.45)
PH SMN: 7.34 [PH] (ref 7.35–7.45)
PH SMN: 7.4 [PH] (ref 7.35–7.45)
PIP: 20
PIP: 20
PLATELET # BLD AUTO: 557 K/UL (ref 150–350)
PLATELET # BLD AUTO: 881 K/UL (ref 150–350)
PLATELET BLD QL SMEAR: ABNORMAL
PMV BLD AUTO: 11.1 FL (ref 9.2–12.9)
PMV BLD AUTO: 11.5 FL (ref 9.2–12.9)
PO2 BLDA: 140 MMHG (ref 80–100)
PO2 BLDA: 82 MMHG (ref 80–100)
PO2 BLDA: 85 MMHG (ref 80–100)
PO2 BLDA: 92 MMHG (ref 80–100)
POC BE: -7 MMOL/L
POC BE: -7 MMOL/L
POC BE: -8 MMOL/L
POC BE: -8 MMOL/L
POC IONIZED CALCIUM: 1.22 MMOL/L (ref 1.06–1.42)
POC IONIZED CALCIUM: 1.23 MMOL/L (ref 1.06–1.42)
POC IONIZED CALCIUM: 1.23 MMOL/L (ref 1.06–1.42)
POC IONIZED CALCIUM: 1.25 MMOL/L (ref 1.06–1.42)
POC SATURATED O2: 96 % (ref 95–100)
POC SATURATED O2: 97 % (ref 95–100)
POC SATURATED O2: 97 % (ref 95–100)
POC SATURATED O2: 99 % (ref 95–100)
POC TCO2: 19 MMOL/L (ref 23–27)
POC TCO2: 20 MMOL/L (ref 23–27)
POIKILOCYTOSIS BLD QL SMEAR: ABNORMAL
POLYCHROMASIA BLD QL SMEAR: ABNORMAL
POTASSIUM BLD-SCNC: 4.2 MMOL/L (ref 3.5–5.1)
POTASSIUM BLD-SCNC: 4.3 MMOL/L (ref 3.5–5.1)
POTASSIUM SERPL-SCNC: 4.2 MMOL/L (ref 3.5–5.1)
PROTHROMBIN TIME: 18.1 SEC (ref 10.6–14.8)
RBC # BLD AUTO: 3.77 M/UL (ref 4.6–6.2)
RBC # BLD AUTO: 4.13 M/UL (ref 4.6–6.2)
SAMPLE: ABNORMAL
SITE: ABNORMAL
SODIUM BLD-SCNC: 141 MMOL/L (ref 136–145)
SODIUM BLD-SCNC: 142 MMOL/L (ref 136–145)
SODIUM SERPL-SCNC: 138 MMOL/L (ref 136–145)
SP02: 100
SP02: 33
SP02: 99
VT: 550
VT: 550
WBC # BLD AUTO: 21.11 K/UL (ref 3.9–12.7)
WBC # BLD AUTO: 38.87 K/UL (ref 3.9–12.7)

## 2021-01-02 PROCEDURE — 94003 VENT MGMT INPAT SUBQ DAY: CPT

## 2021-01-02 PROCEDURE — 37799 UNLISTED PX VASCULAR SURGERY: CPT

## 2021-01-02 PROCEDURE — 84132 ASSAY OF SERUM POTASSIUM: CPT

## 2021-01-02 PROCEDURE — 83735 ASSAY OF MAGNESIUM: CPT

## 2021-01-02 PROCEDURE — 63600175 PHARM REV CODE 636 W HCPCS: Performed by: FAMILY MEDICINE

## 2021-01-02 PROCEDURE — 25000003 PHARM REV CODE 250: Performed by: THORACIC SURGERY (CARDIOTHORACIC VASCULAR SURGERY)

## 2021-01-02 PROCEDURE — 27000221 HC OXYGEN, UP TO 24 HOURS

## 2021-01-02 PROCEDURE — 84295 ASSAY OF SERUM SODIUM: CPT

## 2021-01-02 PROCEDURE — 25000003 PHARM REV CODE 250: Performed by: NURSE PRACTITIONER

## 2021-01-02 PROCEDURE — 63600175 PHARM REV CODE 636 W HCPCS: Performed by: THORACIC SURGERY (CARDIOTHORACIC VASCULAR SURGERY)

## 2021-01-02 PROCEDURE — 99900017 HC EXTUBATION W/PARAMETERS (STAT)

## 2021-01-02 PROCEDURE — P9016 RBC LEUKOCYTES REDUCED: HCPCS

## 2021-01-02 PROCEDURE — 63600175 PHARM REV CODE 636 W HCPCS: Performed by: INTERNAL MEDICINE

## 2021-01-02 PROCEDURE — 27000683 HC PILLOW THERAPEUTIC

## 2021-01-02 PROCEDURE — 99900035 HC TECH TIME PER 15 MIN (STAT)

## 2021-01-02 PROCEDURE — 85014 HEMATOCRIT: CPT

## 2021-01-02 PROCEDURE — A4216 STERILE WATER/SALINE, 10 ML: HCPCS | Performed by: SPECIALIST

## 2021-01-02 PROCEDURE — 21000000 HC CCU ICU ROOM CHARGE

## 2021-01-02 PROCEDURE — 85610 PROTHROMBIN TIME: CPT

## 2021-01-02 PROCEDURE — 85730 THROMBOPLASTIN TIME PARTIAL: CPT

## 2021-01-02 PROCEDURE — 80048 BASIC METABOLIC PNL TOTAL CA: CPT

## 2021-01-02 PROCEDURE — 36415 COLL VENOUS BLD VENIPUNCTURE: CPT

## 2021-01-02 PROCEDURE — 82330 ASSAY OF CALCIUM: CPT

## 2021-01-02 PROCEDURE — 85025 COMPLETE CBC W/AUTO DIFF WBC: CPT

## 2021-01-02 PROCEDURE — 20000000 HC ICU ROOM

## 2021-01-02 PROCEDURE — 85027 COMPLETE CBC AUTOMATED: CPT

## 2021-01-02 PROCEDURE — 82962 GLUCOSE BLOOD TEST: CPT

## 2021-01-02 PROCEDURE — 82803 BLOOD GASES ANY COMBINATION: CPT

## 2021-01-02 PROCEDURE — 85007 BL SMEAR W/DIFF WBC COUNT: CPT

## 2021-01-02 PROCEDURE — 94761 N-INVAS EAR/PLS OXIMETRY MLT: CPT

## 2021-01-02 PROCEDURE — 85384 FIBRINOGEN ACTIVITY: CPT

## 2021-01-02 PROCEDURE — 25000003 PHARM REV CODE 250: Performed by: INTERNAL MEDICINE

## 2021-01-02 PROCEDURE — 36430 TRANSFUSION BLD/BLD COMPNT: CPT

## 2021-01-02 PROCEDURE — 99900031 HC PATIENT EDUCATION (STAT)

## 2021-01-02 PROCEDURE — 25000003 PHARM REV CODE 250: Performed by: SPECIALIST

## 2021-01-02 RX ORDER — HYDROCODONE BITARTRATE AND ACETAMINOPHEN 500; 5 MG/1; MG/1
TABLET ORAL
Status: DISCONTINUED | OUTPATIENT
Start: 2021-01-02 | End: 2021-01-11 | Stop reason: HOSPADM

## 2021-01-02 RX ORDER — ATROPINE SULFATE 0.1 MG/ML
INJECTION INTRAVENOUS
Status: DISCONTINUED
Start: 2021-01-02 | End: 2021-01-02 | Stop reason: WASHOUT

## 2021-01-02 RX ORDER — LORAZEPAM 2 MG/ML
1 INJECTION INTRAMUSCULAR ONCE
Status: DISCONTINUED | OUTPATIENT
Start: 2021-01-02 | End: 2021-01-04

## 2021-01-02 RX ORDER — PHYTONADIONE 10 MG/ML
5 INJECTION, EMULSION INTRAMUSCULAR; INTRAVENOUS; SUBCUTANEOUS ONCE
Status: DISCONTINUED | OUTPATIENT
Start: 2021-01-02 | End: 2021-01-02

## 2021-01-02 RX ORDER — SODIUM CHLORIDE 9 MG/ML
INJECTION, SOLUTION INTRAVENOUS ONCE
Status: DISCONTINUED | OUTPATIENT
Start: 2021-01-02 | End: 2021-01-04

## 2021-01-02 RX ORDER — DIPHENHYDRAMINE HCL 25 MG
50 CAPSULE ORAL
Status: DISCONTINUED | OUTPATIENT
Start: 2021-01-02 | End: 2021-01-11 | Stop reason: HOSPADM

## 2021-01-02 RX ORDER — EPINEPHRINE 0.1 MG/ML
INJECTION INTRAVENOUS
Status: DISCONTINUED
Start: 2021-01-02 | End: 2021-01-02 | Stop reason: WASHOUT

## 2021-01-02 RX ORDER — PHYTONADIONE 10 MG/ML
5 INJECTION, EMULSION INTRAMUSCULAR; INTRAVENOUS; SUBCUTANEOUS ONCE
Status: COMPLETED | OUTPATIENT
Start: 2021-01-02 | End: 2021-01-02

## 2021-01-02 RX ADMIN — POTASSIUM CHLORIDE 10 MEQ: 750 CAPSULE, EXTENDED RELEASE ORAL at 09:01

## 2021-01-02 RX ADMIN — SODIUM CHLORIDE, PRESERVATIVE FREE 2 ML: 5 INJECTION INTRAVENOUS at 06:01

## 2021-01-02 RX ADMIN — ASPIRIN 325 MG ORAL TABLET 325 MG: 325 PILL ORAL at 09:01

## 2021-01-02 RX ADMIN — LATANOPROST 1 DROP: 50 SOLUTION OPHTHALMIC at 08:01

## 2021-01-02 RX ADMIN — SODIUM CHLORIDE, PRESERVATIVE FREE 2 ML: 5 INJECTION INTRAVENOUS at 12:01

## 2021-01-02 RX ADMIN — DOCUSATE SODIUM 100 MG: 100 CAPSULE, LIQUID FILLED ORAL at 09:01

## 2021-01-02 RX ADMIN — MUPIROCIN 1 G: 20 OINTMENT TOPICAL at 08:01

## 2021-01-02 RX ADMIN — BRIMONIDINE TARTRATE 1 DROP: 1.5 SOLUTION OPHTHALMIC at 08:01

## 2021-01-02 RX ADMIN — ROSUVASTATIN CALCIUM 10 MG: 10 TABLET, FILM COATED ORAL at 09:01

## 2021-01-02 RX ADMIN — BRIMONIDINE TARTRATE 1 DROP: 1.5 SOLUTION OPHTHALMIC at 09:01

## 2021-01-02 RX ADMIN — CHLORHEXIDINE GLUCONATE 10 ML: 1.2 RINSE ORAL at 08:01

## 2021-01-02 RX ADMIN — MUPIROCIN 1 G: 20 OINTMENT TOPICAL at 09:01

## 2021-01-02 RX ADMIN — DOCUSATE SODIUM 100 MG: 100 CAPSULE, LIQUID FILLED ORAL at 08:01

## 2021-01-02 RX ADMIN — FAMOTIDINE 20 MG: 10 INJECTION INTRAVENOUS at 08:01

## 2021-01-02 RX ADMIN — SODIUM CHLORIDE 75 ML/HR: 0.9 INJECTION, SOLUTION INTRAVENOUS at 07:01

## 2021-01-02 RX ADMIN — SODIUM CHLORIDE 125 MG: 9 INJECTION, SOLUTION INTRAVENOUS at 03:01

## 2021-01-02 RX ADMIN — CEFAZOLIN SODIUM 2 G: 2 SOLUTION INTRAVENOUS at 12:01

## 2021-01-02 RX ADMIN — CHLORHEXIDINE GLUCONATE 10 ML: 1.2 RINSE ORAL at 09:01

## 2021-01-02 RX ADMIN — FAMOTIDINE 20 MG: 10 INJECTION INTRAVENOUS at 09:01

## 2021-01-02 RX ADMIN — SODIUM CHLORIDE 75 ML/HR: 0.9 INJECTION, SOLUTION INTRAVENOUS at 04:01

## 2021-01-02 RX ADMIN — DONEPEZIL HYDROCHLORIDE 10 MG: 5 TABLET, FILM COATED ORAL at 08:01

## 2021-01-02 RX ADMIN — ALLOPURINOL 100 MG: 100 TABLET ORAL at 09:01

## 2021-01-02 RX ADMIN — DILTIAZEM HYDROCHLORIDE 360 MG: 180 CAPSULE, COATED, EXTENDED RELEASE ORAL at 09:01

## 2021-01-02 RX ADMIN — PHYTONADIONE 5 MG: 10 INJECTION, EMULSION INTRAMUSCULAR; INTRAVENOUS; SUBCUTANEOUS at 03:01

## 2021-01-02 RX ADMIN — PANTOPRAZOLE SODIUM 40 MG: 40 TABLET, DELAYED RELEASE ORAL at 09:01

## 2021-01-02 RX ADMIN — CEFAZOLIN SODIUM 2 G: 2 SOLUTION INTRAVENOUS at 08:01

## 2021-01-02 RX ADMIN — LEVOTHYROXINE SODIUM 88 MCG: 88 TABLET ORAL at 09:01

## 2021-01-02 RX ADMIN — POTASSIUM CHLORIDE 10 MEQ: 750 CAPSULE, EXTENDED RELEASE ORAL at 08:01

## 2021-01-02 RX ADMIN — ONDANSETRON 4 MG: 2 INJECTION INTRAMUSCULAR; INTRAVENOUS at 03:01

## 2021-01-03 LAB
ANION GAP SERPL CALC-SCNC: 5 MMOL/L (ref 8–16)
ANISOCYTOSIS BLD QL SMEAR: ABNORMAL
ANISOCYTOSIS BLD QL SMEAR: ABNORMAL
APTT PPP: 40.7 SEC (ref 23.6–33.3)
BASOPHILS # BLD AUTO: 0.07 K/UL (ref 0–0.2)
BASOPHILS NFR BLD: 0 % (ref 0–1.9)
BASOPHILS NFR BLD: 0 % (ref 0–1.9)
BASOPHILS NFR BLD: 0.2 % (ref 0–1.9)
BUN SERPL-MCNC: 24 MG/DL (ref 8–23)
CALCIUM SERPL-MCNC: 7.7 MG/DL (ref 8.7–10.5)
CHLORIDE SERPL-SCNC: 115 MMOL/L (ref 95–110)
CO2 SERPL-SCNC: 20 MMOL/L (ref 23–29)
CREAT SERPL-MCNC: 1.2 MG/DL (ref 0.5–1.4)
DIFFERENTIAL METHOD: ABNORMAL
EOSINOPHIL # BLD AUTO: 0 K/UL (ref 0–0.5)
EOSINOPHIL NFR BLD: 0 % (ref 0–8)
ERYTHROCYTE [DISTWIDTH] IN BLOOD BY AUTOMATED COUNT: 21.2 % (ref 11.5–14.5)
ERYTHROCYTE [DISTWIDTH] IN BLOOD BY AUTOMATED COUNT: 21.3 % (ref 11.5–14.5)
ERYTHROCYTE [DISTWIDTH] IN BLOOD BY AUTOMATED COUNT: 21.3 % (ref 11.5–14.5)
EST. GFR  (AFRICAN AMERICAN): >60 ML/MIN/1.73 M^2
EST. GFR  (NON AFRICAN AMERICAN): 57.2 ML/MIN/1.73 M^2
GIANT PLATELETS BLD QL SMEAR: PRESENT
GLUCOSE SERPL-MCNC: 152 MG/DL (ref 70–110)
HCT VFR BLD AUTO: 25.9 % (ref 40–54)
HCT VFR BLD AUTO: 27.1 % (ref 40–54)
HCT VFR BLD AUTO: 28.4 % (ref 40–54)
HGB BLD-MCNC: 7.8 G/DL (ref 14–18)
HGB BLD-MCNC: 8 G/DL (ref 14–18)
HGB BLD-MCNC: 8.3 G/DL (ref 14–18)
HYPOCHROMIA BLD QL SMEAR: ABNORMAL
HYPOCHROMIA BLD QL SMEAR: ABNORMAL
IMM GRANULOCYTES # BLD AUTO: 1 K/UL (ref 0–0.04)
IMM GRANULOCYTES # BLD AUTO: ABNORMAL K/UL (ref 0–0.04)
IMM GRANULOCYTES # BLD AUTO: ABNORMAL K/UL (ref 0–0.04)
IMM GRANULOCYTES NFR BLD AUTO: 3.4 % (ref 0–0.5)
IMM GRANULOCYTES NFR BLD AUTO: ABNORMAL % (ref 0–0.5)
IMM GRANULOCYTES NFR BLD AUTO: ABNORMAL % (ref 0–0.5)
LYMPHOCYTES # BLD AUTO: 0.8 K/UL (ref 1–4.8)
LYMPHOCYTES NFR BLD: 2 % (ref 18–48)
LYMPHOCYTES NFR BLD: 2 % (ref 18–48)
LYMPHOCYTES NFR BLD: 2.5 % (ref 18–48)
MAGNESIUM SERPL-MCNC: 2 MG/DL (ref 1.6–2.6)
MCH RBC QN AUTO: 19.4 PG (ref 27–31)
MCH RBC QN AUTO: 19.5 PG (ref 27–31)
MCH RBC QN AUTO: 19.7 PG (ref 27–31)
MCHC RBC AUTO-ENTMCNC: 29.2 G/DL (ref 32–36)
MCHC RBC AUTO-ENTMCNC: 29.5 G/DL (ref 32–36)
MCHC RBC AUTO-ENTMCNC: 30.1 G/DL (ref 32–36)
MCV RBC AUTO: 65 FL (ref 82–98)
MCV RBC AUTO: 66 FL (ref 82–98)
MCV RBC AUTO: 67 FL (ref 82–98)
METAMYELOCYTES NFR BLD MANUAL: 1 %
MONOCYTES # BLD AUTO: 2 K/UL (ref 0.3–1)
MONOCYTES NFR BLD: 2 % (ref 4–15)
MONOCYTES NFR BLD: 2 % (ref 4–15)
MONOCYTES NFR BLD: 6.8 % (ref 4–15)
NEUTROPHILS # BLD AUTO: 25.9 K/UL (ref 1.8–7.7)
NEUTROPHILS NFR BLD: 87.1 % (ref 38–73)
NEUTROPHILS NFR BLD: 91 % (ref 38–73)
NEUTROPHILS NFR BLD: 94 % (ref 38–73)
NEUTS BAND NFR BLD MANUAL: 2 %
NEUTS BAND NFR BLD MANUAL: 4 %
NRBC BLD-RTO: 0 /100 WBC
OVALOCYTES BLD QL SMEAR: ABNORMAL
PLATELET # BLD AUTO: 769 K/UL (ref 150–350)
PLATELET # BLD AUTO: 807 K/UL (ref 150–350)
PLATELET # BLD AUTO: 818 K/UL (ref 150–350)
PLATELET BLD QL SMEAR: ABNORMAL
PMV BLD AUTO: 10.6 FL (ref 9.2–12.9)
PMV BLD AUTO: 11.1 FL (ref 9.2–12.9)
PMV BLD AUTO: 11.2 FL (ref 9.2–12.9)
POIKILOCYTOSIS BLD QL SMEAR: ABNORMAL
POIKILOCYTOSIS BLD QL SMEAR: ABNORMAL
POLYCHROMASIA BLD QL SMEAR: ABNORMAL
POLYCHROMASIA BLD QL SMEAR: ABNORMAL
POTASSIUM SERPL-SCNC: 4.6 MMOL/L (ref 3.5–5.1)
RBC # BLD AUTO: 3.96 M/UL (ref 4.6–6.2)
RBC # BLD AUTO: 4.1 M/UL (ref 4.6–6.2)
RBC # BLD AUTO: 4.27 M/UL (ref 4.6–6.2)
SODIUM SERPL-SCNC: 140 MMOL/L (ref 136–145)
WBC # BLD AUTO: 29.57 K/UL (ref 3.9–12.7)
WBC # BLD AUTO: 29.73 K/UL (ref 3.9–12.7)
WBC # BLD AUTO: 30.89 K/UL (ref 3.9–12.7)

## 2021-01-03 PROCEDURE — 93010 ELECTROCARDIOGRAM REPORT: CPT | Mod: ,,, | Performed by: INTERNAL MEDICINE

## 2021-01-03 PROCEDURE — 99900031 HC PATIENT EDUCATION (STAT)

## 2021-01-03 PROCEDURE — 63600175 PHARM REV CODE 636 W HCPCS: Performed by: FAMILY MEDICINE

## 2021-01-03 PROCEDURE — 25000003 PHARM REV CODE 250: Performed by: INTERNAL MEDICINE

## 2021-01-03 PROCEDURE — 21000000 HC CCU ICU ROOM CHARGE

## 2021-01-03 PROCEDURE — 99900035 HC TECH TIME PER 15 MIN (STAT)

## 2021-01-03 PROCEDURE — 63600175 PHARM REV CODE 636 W HCPCS: Performed by: INTERNAL MEDICINE

## 2021-01-03 PROCEDURE — 25000003 PHARM REV CODE 250: Performed by: NURSE PRACTITIONER

## 2021-01-03 PROCEDURE — 93010 EKG 12-LEAD: ICD-10-PCS | Mod: ,,, | Performed by: INTERNAL MEDICINE

## 2021-01-03 PROCEDURE — 94761 N-INVAS EAR/PLS OXIMETRY MLT: CPT

## 2021-01-03 PROCEDURE — 20000000 HC ICU ROOM

## 2021-01-03 PROCEDURE — 80048 BASIC METABOLIC PNL TOTAL CA: CPT

## 2021-01-03 PROCEDURE — 93005 ELECTROCARDIOGRAM TRACING: CPT | Performed by: INTERNAL MEDICINE

## 2021-01-03 PROCEDURE — 25000003 PHARM REV CODE 250: Performed by: SPECIALIST

## 2021-01-03 PROCEDURE — 85025 COMPLETE CBC W/AUTO DIFF WBC: CPT

## 2021-01-03 PROCEDURE — 85730 THROMBOPLASTIN TIME PARTIAL: CPT

## 2021-01-03 PROCEDURE — 85007 BL SMEAR W/DIFF WBC COUNT: CPT | Mod: 91

## 2021-01-03 PROCEDURE — A4216 STERILE WATER/SALINE, 10 ML: HCPCS | Performed by: SPECIALIST

## 2021-01-03 PROCEDURE — 83735 ASSAY OF MAGNESIUM: CPT

## 2021-01-03 PROCEDURE — 85027 COMPLETE CBC AUTOMATED: CPT | Mod: 91

## 2021-01-03 PROCEDURE — 25000003 PHARM REV CODE 250: Performed by: THORACIC SURGERY (CARDIOTHORACIC VASCULAR SURGERY)

## 2021-01-03 RX ORDER — FAMOTIDINE 10 MG/ML
20 INJECTION INTRAVENOUS DAILY
Status: DISCONTINUED | OUTPATIENT
Start: 2021-01-04 | End: 2021-01-07

## 2021-01-03 RX ORDER — FUROSEMIDE 10 MG/ML
20 INJECTION INTRAMUSCULAR; INTRAVENOUS ONCE
Status: COMPLETED | OUTPATIENT
Start: 2021-01-03 | End: 2021-01-03

## 2021-01-03 RX ADMIN — ALLOPURINOL 100 MG: 100 TABLET ORAL at 09:01

## 2021-01-03 RX ADMIN — SODIUM CHLORIDE, PRESERVATIVE FREE 2 ML: 5 INJECTION INTRAVENOUS at 12:01

## 2021-01-03 RX ADMIN — SODIUM CHLORIDE, PRESERVATIVE FREE 2 ML: 5 INJECTION INTRAVENOUS at 06:01

## 2021-01-03 RX ADMIN — CHLORHEXIDINE GLUCONATE 10 ML: 1.2 RINSE ORAL at 09:01

## 2021-01-03 RX ADMIN — POTASSIUM CHLORIDE 10 MEQ: 750 CAPSULE, EXTENDED RELEASE ORAL at 08:01

## 2021-01-03 RX ADMIN — FAMOTIDINE 20 MG: 10 INJECTION INTRAVENOUS at 09:01

## 2021-01-03 RX ADMIN — SODIUM CHLORIDE 125 MG: 9 INJECTION, SOLUTION INTRAVENOUS at 10:01

## 2021-01-03 RX ADMIN — ASPIRIN 325 MG ORAL TABLET 325 MG: 325 PILL ORAL at 09:01

## 2021-01-03 RX ADMIN — BRIMONIDINE TARTRATE 1 DROP: 1.5 SOLUTION OPHTHALMIC at 08:01

## 2021-01-03 RX ADMIN — DONEPEZIL HYDROCHLORIDE 10 MG: 5 TABLET, FILM COATED ORAL at 08:01

## 2021-01-03 RX ADMIN — PANTOPRAZOLE SODIUM 40 MG: 40 TABLET, DELAYED RELEASE ORAL at 05:01

## 2021-01-03 RX ADMIN — ROSUVASTATIN CALCIUM 10 MG: 10 TABLET, FILM COATED ORAL at 09:01

## 2021-01-03 RX ADMIN — DOCUSATE SODIUM 100 MG: 100 CAPSULE, LIQUID FILLED ORAL at 08:01

## 2021-01-03 RX ADMIN — LATANOPROST 1 DROP: 50 SOLUTION OPHTHALMIC at 08:01

## 2021-01-03 RX ADMIN — DOCUSATE SODIUM 100 MG: 100 CAPSULE, LIQUID FILLED ORAL at 09:01

## 2021-01-03 RX ADMIN — MUPIROCIN 1 G: 20 OINTMENT TOPICAL at 09:01

## 2021-01-03 RX ADMIN — FUROSEMIDE 20 MG: 10 INJECTION, SOLUTION INTRAMUSCULAR; INTRAVENOUS at 02:01

## 2021-01-03 RX ADMIN — BRIMONIDINE TARTRATE 1 DROP: 1.5 SOLUTION OPHTHALMIC at 09:01

## 2021-01-03 RX ADMIN — LEVOTHYROXINE SODIUM 88 MCG: 88 TABLET ORAL at 05:01

## 2021-01-03 RX ADMIN — MUPIROCIN 1 G: 20 OINTMENT TOPICAL at 08:01

## 2021-01-03 RX ADMIN — CHLORHEXIDINE GLUCONATE 10 ML: 1.2 RINSE ORAL at 08:01

## 2021-01-04 LAB
ANION GAP SERPL CALC-SCNC: 5 MMOL/L (ref 8–16)
APTT PPP: 43.3 SEC (ref 23.6–33.3)
BLD PROD TYP BPU: NORMAL
BLD PROD TYP BPU: NORMAL
BLOOD UNIT EXPIRATION DATE: NORMAL
BLOOD UNIT EXPIRATION DATE: NORMAL
BLOOD UNIT TYPE CODE: 1700
BLOOD UNIT TYPE CODE: 1700
BLOOD UNIT TYPE: NORMAL
BLOOD UNIT TYPE: NORMAL
BUN SERPL-MCNC: 27 MG/DL (ref 8–23)
CALCIUM SERPL-MCNC: 7.7 MG/DL (ref 8.7–10.5)
CHLORIDE SERPL-SCNC: 113 MMOL/L (ref 95–110)
CO2 SERPL-SCNC: 21 MMOL/L (ref 23–29)
CODING SYSTEM: NORMAL
CODING SYSTEM: NORMAL
CREAT SERPL-MCNC: 1.2 MG/DL (ref 0.5–1.4)
DISPENSE STATUS: NORMAL
DISPENSE STATUS: NORMAL
ERYTHROCYTE [DISTWIDTH] IN BLOOD BY AUTOMATED COUNT: 21.1 % (ref 11.5–14.5)
EST. GFR  (AFRICAN AMERICAN): >60 ML/MIN/1.73 M^2
EST. GFR  (NON AFRICAN AMERICAN): 57.2 ML/MIN/1.73 M^2
GLUCOSE SERPL-MCNC: 113 MG/DL (ref 70–110)
HCT VFR BLD AUTO: 25.8 % (ref 40–54)
HGB BLD-MCNC: 7.8 G/DL (ref 14–18)
INR PPP: 1.4
MCH RBC QN AUTO: 20.3 PG (ref 27–31)
MCHC RBC AUTO-ENTMCNC: 30.2 G/DL (ref 32–36)
MCV RBC AUTO: 67 FL (ref 82–98)
NUM UNITS TRANS PACKED RBC: NORMAL
NUM UNITS TRANS PACKED RBC: NORMAL
PLATELET # BLD AUTO: 801 K/UL (ref 150–350)
PMV BLD AUTO: 10.9 FL (ref 9.2–12.9)
POC ACTIVATED CLOTTING TIME K: 384 SEC (ref 74–137)
POTASSIUM SERPL-SCNC: 4.1 MMOL/L (ref 3.5–5.1)
PROTHROMBIN TIME: 16.5 SEC (ref 10.6–14.8)
RBC # BLD AUTO: 3.84 M/UL (ref 4.6–6.2)
SAMPLE: ABNORMAL
SODIUM SERPL-SCNC: 139 MMOL/L (ref 136–145)
WBC # BLD AUTO: 26.23 K/UL (ref 3.9–12.7)

## 2021-01-04 PROCEDURE — 25000003 PHARM REV CODE 250: Performed by: SPECIALIST

## 2021-01-04 PROCEDURE — 63600175 PHARM REV CODE 636 W HCPCS: Performed by: INTERNAL MEDICINE

## 2021-01-04 PROCEDURE — 25000003 PHARM REV CODE 250: Performed by: FAMILY MEDICINE

## 2021-01-04 PROCEDURE — 85730 THROMBOPLASTIN TIME PARTIAL: CPT

## 2021-01-04 PROCEDURE — A4216 STERILE WATER/SALINE, 10 ML: HCPCS | Performed by: SPECIALIST

## 2021-01-04 PROCEDURE — 85027 COMPLETE CBC AUTOMATED: CPT

## 2021-01-04 PROCEDURE — 25000003 PHARM REV CODE 250: Performed by: THORACIC SURGERY (CARDIOTHORACIC VASCULAR SURGERY)

## 2021-01-04 PROCEDURE — 85610 PROTHROMBIN TIME: CPT

## 2021-01-04 PROCEDURE — 94761 N-INVAS EAR/PLS OXIMETRY MLT: CPT

## 2021-01-04 PROCEDURE — 25000003 PHARM REV CODE 250: Performed by: NURSE PRACTITIONER

## 2021-01-04 PROCEDURE — 27000221 HC OXYGEN, UP TO 24 HOURS

## 2021-01-04 PROCEDURE — 80048 BASIC METABOLIC PNL TOTAL CA: CPT

## 2021-01-04 PROCEDURE — 99900035 HC TECH TIME PER 15 MIN (STAT)

## 2021-01-04 PROCEDURE — 99900031 HC PATIENT EDUCATION (STAT)

## 2021-01-04 PROCEDURE — 25000003 PHARM REV CODE 250: Performed by: INTERNAL MEDICINE

## 2021-01-04 PROCEDURE — 20000000 HC ICU ROOM

## 2021-01-04 RX ORDER — METOPROLOL TARTRATE 25 MG/1
12.5 TABLET ORAL 2 TIMES DAILY
Status: DISCONTINUED | OUTPATIENT
Start: 2021-01-04 | End: 2021-01-04

## 2021-01-04 RX ORDER — METOPROLOL TARTRATE 25 MG/1
25 TABLET, FILM COATED ORAL 2 TIMES DAILY
Status: DISCONTINUED | OUTPATIENT
Start: 2021-01-05 | End: 2021-01-04

## 2021-01-04 RX ORDER — FUROSEMIDE 20 MG/1
20 TABLET ORAL ONCE
Status: COMPLETED | OUTPATIENT
Start: 2021-01-04 | End: 2021-01-04

## 2021-01-04 RX ORDER — METOPROLOL TARTRATE 25 MG/1
25 TABLET, FILM COATED ORAL 2 TIMES DAILY
Status: DISCONTINUED | OUTPATIENT
Start: 2021-01-05 | End: 2021-01-06

## 2021-01-04 RX ORDER — POLYETHYLENE GLYCOL 3350 17 G/17G
17 POWDER, FOR SOLUTION ORAL DAILY
Status: DISCONTINUED | OUTPATIENT
Start: 2021-01-04 | End: 2021-01-05 | Stop reason: SDUPTHER

## 2021-01-04 RX ORDER — METOPROLOL TARTRATE 1 MG/ML
5 INJECTION, SOLUTION INTRAVENOUS
Status: COMPLETED | OUTPATIENT
Start: 2021-01-04 | End: 2021-01-04

## 2021-01-04 RX ORDER — METOPROLOL TARTRATE 25 MG/1
25 TABLET, FILM COATED ORAL ONCE
Status: COMPLETED | OUTPATIENT
Start: 2021-01-04 | End: 2021-01-04

## 2021-01-04 RX ORDER — METOPROLOL TARTRATE 25 MG/1
25 TABLET, FILM COATED ORAL ONCE
Status: COMPLETED | OUTPATIENT
Start: 2021-01-05 | End: 2021-01-05

## 2021-01-04 RX ADMIN — SODIUM CHLORIDE 125 MG: 9 INJECTION, SOLUTION INTRAVENOUS at 10:01

## 2021-01-04 RX ADMIN — MUPIROCIN 1 G: 20 OINTMENT TOPICAL at 08:01

## 2021-01-04 RX ADMIN — SODIUM CHLORIDE, PRESERVATIVE FREE 2 ML: 5 INJECTION INTRAVENOUS at 06:01

## 2021-01-04 RX ADMIN — METOPROLOL TARTRATE 12.5 MG: 25 TABLET, FILM COATED ORAL at 08:01

## 2021-01-04 RX ADMIN — CHLORHEXIDINE GLUCONATE 10 ML: 1.2 RINSE ORAL at 08:01

## 2021-01-04 RX ADMIN — ASPIRIN 325 MG ORAL TABLET 325 MG: 325 PILL ORAL at 08:01

## 2021-01-04 RX ADMIN — PANTOPRAZOLE SODIUM 40 MG: 40 TABLET, DELAYED RELEASE ORAL at 06:01

## 2021-01-04 RX ADMIN — BRIMONIDINE TARTRATE 1 DROP: 1.5 SOLUTION OPHTHALMIC at 02:01

## 2021-01-04 RX ADMIN — ROSUVASTATIN CALCIUM 10 MG: 10 TABLET, FILM COATED ORAL at 08:01

## 2021-01-04 RX ADMIN — BRIMONIDINE TARTRATE 1 DROP: 1.5 SOLUTION OPHTHALMIC at 08:01

## 2021-01-04 RX ADMIN — POLYETHYLENE GLYCOL 3350 17 G: 17 POWDER, FOR SOLUTION ORAL at 08:01

## 2021-01-04 RX ADMIN — METOPROLOL TARTRATE 5 MG: 5 INJECTION, SOLUTION INTRAVENOUS at 05:01

## 2021-01-04 RX ADMIN — FAMOTIDINE 20 MG: 10 INJECTION INTRAVENOUS at 08:01

## 2021-01-04 RX ADMIN — DEXTROSE MONOHYDRATE 5 MG/HR: 50 INJECTION, SOLUTION INTRAVENOUS at 06:01

## 2021-01-04 RX ADMIN — SODIUM CHLORIDE, PRESERVATIVE FREE 2 ML: 5 INJECTION INTRAVENOUS at 12:01

## 2021-01-04 RX ADMIN — FUROSEMIDE 20 MG: 20 TABLET ORAL at 08:01

## 2021-01-04 RX ADMIN — METOPROLOL TARTRATE 25 MG: 25 TABLET, FILM COATED ORAL at 07:01

## 2021-01-04 RX ADMIN — POTASSIUM CHLORIDE 10 MEQ: 750 CAPSULE, EXTENDED RELEASE ORAL at 08:01

## 2021-01-04 RX ADMIN — SODIUM CHLORIDE 75 ML/HR: 0.9 INJECTION, SOLUTION INTRAVENOUS at 12:01

## 2021-01-04 RX ADMIN — LEVOTHYROXINE SODIUM 88 MCG: 88 TABLET ORAL at 06:01

## 2021-01-04 RX ADMIN — DEXTROSE MONOHYDRATE 10 MG/HR: 50 INJECTION, SOLUTION INTRAVENOUS at 11:01

## 2021-01-04 RX ADMIN — ALLOPURINOL 100 MG: 100 TABLET ORAL at 08:01

## 2021-01-04 RX ADMIN — LATANOPROST 1 DROP: 50 SOLUTION OPHTHALMIC at 08:01

## 2021-01-04 RX ADMIN — DOCUSATE SODIUM 100 MG: 100 CAPSULE, LIQUID FILLED ORAL at 08:01

## 2021-01-04 RX ADMIN — DONEPEZIL HYDROCHLORIDE 10 MG: 5 TABLET, FILM COATED ORAL at 08:01

## 2021-01-05 PROBLEM — D68.9 COAGULOPATHY: Status: ACTIVE | Noted: 2021-01-05

## 2021-01-05 PROBLEM — I48.0 PAROXYSMAL ATRIAL FIBRILLATION: Status: ACTIVE | Noted: 2021-01-05

## 2021-01-05 LAB
ANION GAP SERPL CALC-SCNC: 6 MMOL/L (ref 8–16)
ANISOCYTOSIS BLD QL SMEAR: ABNORMAL
BASOPHILS NFR BLD: 0 % (ref 0–1.9)
BUN SERPL-MCNC: 34 MG/DL (ref 8–23)
CALCIUM SERPL-MCNC: 8.1 MG/DL (ref 8.7–10.5)
CHLORIDE SERPL-SCNC: 114 MMOL/L (ref 95–110)
CO2 SERPL-SCNC: 22 MMOL/L (ref 23–29)
CREAT SERPL-MCNC: 1 MG/DL (ref 0.5–1.4)
DIFFERENTIAL METHOD: ABNORMAL
EOSINOPHIL NFR BLD: 0 % (ref 0–8)
ERYTHROCYTE [DISTWIDTH] IN BLOOD BY AUTOMATED COUNT: 22 % (ref 11.5–14.5)
EST. GFR  (AFRICAN AMERICAN): >60 ML/MIN/1.73 M^2
EST. GFR  (NON AFRICAN AMERICAN): >60 ML/MIN/1.73 M^2
GLUCOSE SERPL-MCNC: 128 MG/DL (ref 70–110)
HCT VFR BLD AUTO: 29.6 % (ref 40–54)
HGB BLD-MCNC: 8.7 G/DL (ref 14–18)
HYPOCHROMIA BLD QL SMEAR: ABNORMAL
IMM GRANULOCYTES # BLD AUTO: ABNORMAL K/UL (ref 0–0.04)
IMM GRANULOCYTES NFR BLD AUTO: ABNORMAL % (ref 0–0.5)
LYMPHOCYTES NFR BLD: 5 % (ref 18–48)
MCH RBC QN AUTO: 20 PG (ref 27–31)
MCHC RBC AUTO-ENTMCNC: 29.4 G/DL (ref 32–36)
MCV RBC AUTO: 68 FL (ref 82–98)
MONOCYTES NFR BLD: 1 % (ref 4–15)
NEUTROPHILS NFR BLD: 86 % (ref 38–73)
NEUTS BAND NFR BLD MANUAL: 8 %
NRBC BLD-RTO: 0 /100 WBC
OVALOCYTES BLD QL SMEAR: ABNORMAL
PLATELET # BLD AUTO: 973 K/UL (ref 150–350)
PMV BLD AUTO: 10.6 FL (ref 9.2–12.9)
POIKILOCYTOSIS BLD QL SMEAR: ABNORMAL
POLYCHROMASIA BLD QL SMEAR: ABNORMAL
POTASSIUM SERPL-SCNC: 4.7 MMOL/L (ref 3.5–5.1)
RBC # BLD AUTO: 4.35 M/UL (ref 4.6–6.2)
SODIUM SERPL-SCNC: 142 MMOL/L (ref 136–145)
WBC # BLD AUTO: 37.31 K/UL (ref 3.9–12.7)

## 2021-01-05 PROCEDURE — 94761 N-INVAS EAR/PLS OXIMETRY MLT: CPT

## 2021-01-05 PROCEDURE — 85007 BL SMEAR W/DIFF WBC COUNT: CPT

## 2021-01-05 PROCEDURE — 85027 COMPLETE CBC AUTOMATED: CPT

## 2021-01-05 PROCEDURE — 25000003 PHARM REV CODE 250: Performed by: INTERNAL MEDICINE

## 2021-01-05 PROCEDURE — 99900035 HC TECH TIME PER 15 MIN (STAT)

## 2021-01-05 PROCEDURE — A4216 STERILE WATER/SALINE, 10 ML: HCPCS | Performed by: SPECIALIST

## 2021-01-05 PROCEDURE — 25000003 PHARM REV CODE 250: Performed by: SPECIALIST

## 2021-01-05 PROCEDURE — 25000003 PHARM REV CODE 250: Performed by: NURSE PRACTITIONER

## 2021-01-05 PROCEDURE — 25000003 PHARM REV CODE 250: Performed by: FAMILY MEDICINE

## 2021-01-05 PROCEDURE — 97530 THERAPEUTIC ACTIVITIES: CPT

## 2021-01-05 PROCEDURE — 63600175 PHARM REV CODE 636 W HCPCS: Performed by: INTERNAL MEDICINE

## 2021-01-05 PROCEDURE — 80048 BASIC METABOLIC PNL TOTAL CA: CPT

## 2021-01-05 PROCEDURE — 21000000 HC CCU ICU ROOM CHARGE

## 2021-01-05 PROCEDURE — 97162 PT EVAL MOD COMPLEX 30 MIN: CPT

## 2021-01-05 PROCEDURE — 25000003 PHARM REV CODE 250: Performed by: THORACIC SURGERY (CARDIOTHORACIC VASCULAR SURGERY)

## 2021-01-05 PROCEDURE — 94799 UNLISTED PULMONARY SVC/PX: CPT

## 2021-01-05 RX ORDER — GUAIFENESIN 600 MG/1
600 TABLET, EXTENDED RELEASE ORAL 2 TIMES DAILY
Status: DISCONTINUED | OUTPATIENT
Start: 2021-01-05 | End: 2021-01-11 | Stop reason: HOSPADM

## 2021-01-05 RX ORDER — POLYETHYLENE GLYCOL 3350 17 G/17G
17 POWDER, FOR SOLUTION ORAL DAILY
Status: DISCONTINUED | OUTPATIENT
Start: 2021-01-05 | End: 2021-01-11 | Stop reason: HOSPADM

## 2021-01-05 RX ADMIN — DOCUSATE SODIUM 100 MG: 100 CAPSULE, LIQUID FILLED ORAL at 08:01

## 2021-01-05 RX ADMIN — POLYETHYLENE GLYCOL 3350 17 G: 17 POWDER, FOR SOLUTION ORAL at 08:01

## 2021-01-05 RX ADMIN — ASPIRIN 325 MG ORAL TABLET 325 MG: 325 PILL ORAL at 08:01

## 2021-01-05 RX ADMIN — FAMOTIDINE 20 MG: 10 INJECTION INTRAVENOUS at 08:01

## 2021-01-05 RX ADMIN — METOPROLOL TARTRATE 25 MG: 25 TABLET, FILM COATED ORAL at 01:01

## 2021-01-05 RX ADMIN — DOCUSATE SODIUM 100 MG: 100 CAPSULE, LIQUID FILLED ORAL at 07:01

## 2021-01-05 RX ADMIN — LEVOTHYROXINE SODIUM 88 MCG: 88 TABLET ORAL at 05:01

## 2021-01-05 RX ADMIN — ALLOPURINOL 100 MG: 100 TABLET ORAL at 08:01

## 2021-01-05 RX ADMIN — CHLORHEXIDINE GLUCONATE 10 ML: 1.2 RINSE ORAL at 08:01

## 2021-01-05 RX ADMIN — LATANOPROST 1 DROP: 50 SOLUTION OPHTHALMIC at 09:01

## 2021-01-05 RX ADMIN — METOPROLOL TARTRATE 25 MG: 25 TABLET, FILM COATED ORAL at 08:01

## 2021-01-05 RX ADMIN — SODIUM CHLORIDE, PRESERVATIVE FREE 2 ML: 5 INJECTION INTRAVENOUS at 12:01

## 2021-01-05 RX ADMIN — GUAIFENESIN 600 MG: 600 TABLET, EXTENDED RELEASE ORAL at 07:01

## 2021-01-05 RX ADMIN — DONEPEZIL HYDROCHLORIDE 10 MG: 5 TABLET, FILM COATED ORAL at 07:01

## 2021-01-05 RX ADMIN — ROSUVASTATIN CALCIUM 10 MG: 10 TABLET, FILM COATED ORAL at 08:01

## 2021-01-05 RX ADMIN — BRIMONIDINE TARTRATE 1 DROP: 1.5 SOLUTION OPHTHALMIC at 02:01

## 2021-01-05 RX ADMIN — CHLORHEXIDINE GLUCONATE 10 ML: 1.2 RINSE ORAL at 07:01

## 2021-01-05 RX ADMIN — BRIMONIDINE TARTRATE 1 DROP: 1.5 SOLUTION OPHTHALMIC at 07:01

## 2021-01-05 RX ADMIN — SODIUM CHLORIDE 125 MG: 9 INJECTION, SOLUTION INTRAVENOUS at 08:01

## 2021-01-05 RX ADMIN — PANTOPRAZOLE SODIUM 40 MG: 40 TABLET, DELAYED RELEASE ORAL at 05:01

## 2021-01-05 RX ADMIN — METOPROLOL TARTRATE 25 MG: 25 TABLET, FILM COATED ORAL at 07:01

## 2021-01-05 RX ADMIN — MUPIROCIN 1 G: 20 OINTMENT TOPICAL at 08:01

## 2021-01-05 RX ADMIN — SODIUM CHLORIDE, PRESERVATIVE FREE 2 ML: 5 INJECTION INTRAVENOUS at 06:01

## 2021-01-05 RX ADMIN — GUAIFENESIN 600 MG: 600 TABLET, EXTENDED RELEASE ORAL at 08:01

## 2021-01-05 RX ADMIN — BRIMONIDINE TARTRATE 1 DROP: 1.5 SOLUTION OPHTHALMIC at 08:01

## 2021-01-05 RX ADMIN — MUPIROCIN 1 G: 20 OINTMENT TOPICAL at 07:01

## 2021-01-06 LAB
ANION GAP SERPL CALC-SCNC: 9 MMOL/L (ref 8–16)
BASOPHILS # BLD AUTO: 0.16 K/UL (ref 0–0.2)
BASOPHILS NFR BLD: 0.6 % (ref 0–1.9)
BUN SERPL-MCNC: 31 MG/DL (ref 8–23)
CALCIUM SERPL-MCNC: 8.2 MG/DL (ref 8.7–10.5)
CHLORIDE SERPL-SCNC: 112 MMOL/L (ref 95–110)
CO2 SERPL-SCNC: 23 MMOL/L (ref 23–29)
CREAT SERPL-MCNC: 1 MG/DL (ref 0.5–1.4)
DIFFERENTIAL METHOD: ABNORMAL
EOSINOPHIL # BLD AUTO: 0.3 K/UL (ref 0–0.5)
EOSINOPHIL NFR BLD: 1 % (ref 0–8)
ERYTHROCYTE [DISTWIDTH] IN BLOOD BY AUTOMATED COUNT: 23.5 % (ref 11.5–14.5)
EST. GFR  (AFRICAN AMERICAN): >60 ML/MIN/1.73 M^2
EST. GFR  (NON AFRICAN AMERICAN): >60 ML/MIN/1.73 M^2
GLUCOSE SERPL-MCNC: 106 MG/DL (ref 70–110)
HCT VFR BLD AUTO: 27.7 % (ref 40–54)
HGB BLD-MCNC: 8.3 G/DL (ref 14–18)
IMM GRANULOCYTES # BLD AUTO: 1.18 K/UL (ref 0–0.04)
IMM GRANULOCYTES NFR BLD AUTO: 4.2 % (ref 0–0.5)
LYMPHOCYTES # BLD AUTO: 1.6 K/UL (ref 1–4.8)
LYMPHOCYTES NFR BLD: 5.7 % (ref 18–48)
MAGNESIUM SERPL-MCNC: 2.1 MG/DL (ref 1.6–2.6)
MCH RBC QN AUTO: 20.8 PG (ref 27–31)
MCHC RBC AUTO-ENTMCNC: 30 G/DL (ref 32–36)
MCV RBC AUTO: 69 FL (ref 82–98)
MONOCYTES # BLD AUTO: 2 K/UL (ref 0.3–1)
MONOCYTES NFR BLD: 6.9 % (ref 4–15)
NEUTROPHILS # BLD AUTO: 22.9 K/UL (ref 1.8–7.7)
NEUTROPHILS NFR BLD: 81.6 % (ref 38–73)
NRBC BLD-RTO: 1 /100 WBC
PLATELET # BLD AUTO: 957 K/UL (ref 150–350)
PMV BLD AUTO: 11.1 FL (ref 9.2–12.9)
POTASSIUM SERPL-SCNC: 4.1 MMOL/L (ref 3.5–5.1)
RBC # BLD AUTO: 3.99 M/UL (ref 4.6–6.2)
SODIUM SERPL-SCNC: 144 MMOL/L (ref 136–145)
T4 FREE SERPL-MCNC: 0.83 NG/DL (ref 0.71–1.51)
TSH SERPL DL<=0.005 MIU/L-ACNC: 1.6 UIU/ML (ref 0.34–5.6)
WBC # BLD AUTO: 28.09 K/UL (ref 3.9–12.7)

## 2021-01-06 PROCEDURE — 94761 N-INVAS EAR/PLS OXIMETRY MLT: CPT

## 2021-01-06 PROCEDURE — 63600175 PHARM REV CODE 636 W HCPCS: Performed by: INTERNAL MEDICINE

## 2021-01-06 PROCEDURE — 83735 ASSAY OF MAGNESIUM: CPT

## 2021-01-06 PROCEDURE — 99900035 HC TECH TIME PER 15 MIN (STAT)

## 2021-01-06 PROCEDURE — 25000003 PHARM REV CODE 250: Performed by: NURSE PRACTITIONER

## 2021-01-06 PROCEDURE — 21000000 HC CCU ICU ROOM CHARGE

## 2021-01-06 PROCEDURE — 25000003 PHARM REV CODE 250: Performed by: SPECIALIST

## 2021-01-06 PROCEDURE — 63600175 PHARM REV CODE 636 W HCPCS: Performed by: THORACIC SURGERY (CARDIOTHORACIC VASCULAR SURGERY)

## 2021-01-06 PROCEDURE — 85025 COMPLETE CBC W/AUTO DIFF WBC: CPT

## 2021-01-06 PROCEDURE — 97530 THERAPEUTIC ACTIVITIES: CPT | Mod: CQ

## 2021-01-06 PROCEDURE — 25000003 PHARM REV CODE 250: Performed by: THORACIC SURGERY (CARDIOTHORACIC VASCULAR SURGERY)

## 2021-01-06 PROCEDURE — 80048 BASIC METABOLIC PNL TOTAL CA: CPT

## 2021-01-06 PROCEDURE — 25000003 PHARM REV CODE 250: Performed by: INTERNAL MEDICINE

## 2021-01-06 PROCEDURE — 84439 ASSAY OF FREE THYROXINE: CPT

## 2021-01-06 PROCEDURE — 84443 ASSAY THYROID STIM HORMONE: CPT

## 2021-01-06 PROCEDURE — 25000003 PHARM REV CODE 250: Performed by: FAMILY MEDICINE

## 2021-01-06 RX ORDER — FUROSEMIDE 10 MG/ML
20 INJECTION INTRAMUSCULAR; INTRAVENOUS ONCE
Status: DISCONTINUED | OUTPATIENT
Start: 2021-01-06 | End: 2021-01-06

## 2021-01-06 RX ORDER — METOPROLOL TARTRATE 1 MG/ML
5 INJECTION, SOLUTION INTRAVENOUS ONCE
Status: DISCONTINUED | OUTPATIENT
Start: 2021-01-06 | End: 2021-01-08

## 2021-01-06 RX ORDER — METOPROLOL TARTRATE 1 MG/ML
5 INJECTION, SOLUTION INTRAVENOUS ONCE
Status: COMPLETED | OUTPATIENT
Start: 2021-01-06 | End: 2021-01-06

## 2021-01-06 RX ORDER — FUROSEMIDE 10 MG/ML
20 INJECTION INTRAMUSCULAR; INTRAVENOUS EVERY 12 HOURS
Status: COMPLETED | OUTPATIENT
Start: 2021-01-06 | End: 2021-01-06

## 2021-01-06 RX ORDER — METOPROLOL TARTRATE 25 MG/1
25 TABLET, FILM COATED ORAL ONCE
Status: COMPLETED | OUTPATIENT
Start: 2021-01-06 | End: 2021-01-06

## 2021-01-06 RX ORDER — METOPROLOL TARTRATE 50 MG/1
50 TABLET ORAL 2 TIMES DAILY
Status: DISCONTINUED | OUTPATIENT
Start: 2021-01-06 | End: 2021-01-08

## 2021-01-06 RX ADMIN — BRIMONIDINE TARTRATE 1 DROP: 1.5 SOLUTION OPHTHALMIC at 08:01

## 2021-01-06 RX ADMIN — METOPROLOL TARTRATE 25 MG: 25 TABLET, FILM COATED ORAL at 08:01

## 2021-01-06 RX ADMIN — DEXTROSE MONOHYDRATE 5 MG/HR: 50 INJECTION, SOLUTION INTRAVENOUS at 06:01

## 2021-01-06 RX ADMIN — MUPIROCIN 1 G: 20 OINTMENT TOPICAL at 08:01

## 2021-01-06 RX ADMIN — ASPIRIN 325 MG ORAL TABLET 325 MG: 325 PILL ORAL at 08:01

## 2021-01-06 RX ADMIN — ALLOPURINOL 100 MG: 100 TABLET ORAL at 08:01

## 2021-01-06 RX ADMIN — PANTOPRAZOLE SODIUM 40 MG: 40 TABLET, DELAYED RELEASE ORAL at 04:01

## 2021-01-06 RX ADMIN — METOPROLOL TARTRATE 5 MG: 5 INJECTION, SOLUTION INTRAVENOUS at 07:01

## 2021-01-06 RX ADMIN — HYDROCODONE BITARTRATE AND ACETAMINOPHEN 1 TABLET: 5; 325 TABLET ORAL at 10:01

## 2021-01-06 RX ADMIN — LEVOTHYROXINE SODIUM 88 MCG: 88 TABLET ORAL at 04:01

## 2021-01-06 RX ADMIN — THERA TABS 1 TABLET: TAB at 01:01

## 2021-01-06 RX ADMIN — METOPROLOL TARTRATE 25 MG: 25 TABLET, FILM COATED ORAL at 02:01

## 2021-01-06 RX ADMIN — POLYETHYLENE GLYCOL 3350 17 G: 17 POWDER, FOR SOLUTION ORAL at 08:01

## 2021-01-06 RX ADMIN — GUAIFENESIN 600 MG: 600 TABLET, EXTENDED RELEASE ORAL at 08:01

## 2021-01-06 RX ADMIN — DONEPEZIL HYDROCHLORIDE 10 MG: 5 TABLET, FILM COATED ORAL at 08:01

## 2021-01-06 RX ADMIN — FAMOTIDINE 20 MG: 10 INJECTION INTRAVENOUS at 08:01

## 2021-01-06 RX ADMIN — LATANOPROST 1 DROP: 50 SOLUTION OPHTHALMIC at 08:01

## 2021-01-06 RX ADMIN — DOCUSATE SODIUM 100 MG: 100 CAPSULE, LIQUID FILLED ORAL at 08:01

## 2021-01-06 RX ADMIN — FUROSEMIDE 20 MG: 10 INJECTION, SOLUTION INTRAMUSCULAR; INTRAVENOUS at 08:01

## 2021-01-06 RX ADMIN — FUROSEMIDE 20 MG: 10 INJECTION, SOLUTION INTRAMUSCULAR; INTRAVENOUS at 01:01

## 2021-01-06 RX ADMIN — SODIUM CHLORIDE 125 MG: 9 INJECTION, SOLUTION INTRAVENOUS at 11:01

## 2021-01-06 RX ADMIN — ROSUVASTATIN CALCIUM 10 MG: 10 TABLET, FILM COATED ORAL at 08:01

## 2021-01-06 RX ADMIN — METOPROLOL TARTRATE 50 MG: 50 TABLET, FILM COATED ORAL at 08:01

## 2021-01-07 LAB
ANION GAP SERPL CALC-SCNC: 6 MMOL/L (ref 8–16)
BUN SERPL-MCNC: 24 MG/DL (ref 8–23)
CALCIUM SERPL-MCNC: 8.1 MG/DL (ref 8.7–10.5)
CHLORIDE SERPL-SCNC: 109 MMOL/L (ref 95–110)
CO2 SERPL-SCNC: 26 MMOL/L (ref 23–29)
CREAT SERPL-MCNC: 0.9 MG/DL (ref 0.5–1.4)
EST. GFR  (AFRICAN AMERICAN): >60 ML/MIN/1.73 M^2
EST. GFR  (NON AFRICAN AMERICAN): >60 ML/MIN/1.73 M^2
GLUCOSE SERPL-MCNC: 106 MG/DL (ref 70–110)
MAGNESIUM SERPL-MCNC: 1.9 MG/DL (ref 1.6–2.6)
POTASSIUM SERPL-SCNC: 3.8 MMOL/L (ref 3.5–5.1)
SODIUM SERPL-SCNC: 141 MMOL/L (ref 136–145)

## 2021-01-07 PROCEDURE — 63600175 PHARM REV CODE 636 W HCPCS: Performed by: SPECIALIST

## 2021-01-07 PROCEDURE — 94761 N-INVAS EAR/PLS OXIMETRY MLT: CPT

## 2021-01-07 PROCEDURE — 25000003 PHARM REV CODE 250: Performed by: THORACIC SURGERY (CARDIOTHORACIC VASCULAR SURGERY)

## 2021-01-07 PROCEDURE — 83735 ASSAY OF MAGNESIUM: CPT

## 2021-01-07 PROCEDURE — 97116 GAIT TRAINING THERAPY: CPT | Mod: CQ

## 2021-01-07 PROCEDURE — 25000003 PHARM REV CODE 250: Performed by: NURSE PRACTITIONER

## 2021-01-07 PROCEDURE — 80048 BASIC METABOLIC PNL TOTAL CA: CPT

## 2021-01-07 PROCEDURE — 21000000 HC CCU ICU ROOM CHARGE

## 2021-01-07 PROCEDURE — 63600175 PHARM REV CODE 636 W HCPCS: Performed by: THORACIC SURGERY (CARDIOTHORACIC VASCULAR SURGERY)

## 2021-01-07 PROCEDURE — 25000003 PHARM REV CODE 250: Performed by: SPECIALIST

## 2021-01-07 PROCEDURE — 99900035 HC TECH TIME PER 15 MIN (STAT)

## 2021-01-07 RX ORDER — FUROSEMIDE 10 MG/ML
20 INJECTION INTRAMUSCULAR; INTRAVENOUS EVERY 12 HOURS
Status: COMPLETED | OUTPATIENT
Start: 2021-01-07 | End: 2021-01-07

## 2021-01-07 RX ORDER — METOPROLOL TARTRATE 1 MG/ML
5 INJECTION, SOLUTION INTRAVENOUS ONCE
Status: COMPLETED | OUTPATIENT
Start: 2021-01-07 | End: 2021-01-07

## 2021-01-07 RX ADMIN — LATANOPROST 1 DROP: 50 SOLUTION OPHTHALMIC at 08:01

## 2021-01-07 RX ADMIN — ASPIRIN 325 MG ORAL TABLET 325 MG: 325 PILL ORAL at 09:01

## 2021-01-07 RX ADMIN — HYDROCODONE BITARTRATE AND ACETAMINOPHEN 1 TABLET: 5; 325 TABLET ORAL at 08:01

## 2021-01-07 RX ADMIN — AMIODARONE HYDROCHLORIDE 0.5 MG/MIN: 1.8 INJECTION, SOLUTION INTRAVENOUS at 11:01

## 2021-01-07 RX ADMIN — ROSUVASTATIN CALCIUM 10 MG: 10 TABLET, FILM COATED ORAL at 09:01

## 2021-01-07 RX ADMIN — METOPROLOL TARTRATE 50 MG: 50 TABLET, FILM COATED ORAL at 09:01

## 2021-01-07 RX ADMIN — PANTOPRAZOLE SODIUM 40 MG: 40 TABLET, DELAYED RELEASE ORAL at 05:01

## 2021-01-07 RX ADMIN — THERA TABS 1 TABLET: TAB at 09:01

## 2021-01-07 RX ADMIN — DOCUSATE SODIUM 100 MG: 100 CAPSULE, LIQUID FILLED ORAL at 09:01

## 2021-01-07 RX ADMIN — DONEPEZIL HYDROCHLORIDE 10 MG: 5 TABLET, FILM COATED ORAL at 08:01

## 2021-01-07 RX ADMIN — METOPROLOL TARTRATE 50 MG: 50 TABLET, FILM COATED ORAL at 08:01

## 2021-01-07 RX ADMIN — METOPROLOL TARTRATE 5 MG: 5 INJECTION, SOLUTION INTRAVENOUS at 04:01

## 2021-01-07 RX ADMIN — BRIMONIDINE TARTRATE 1 DROP: 1.5 SOLUTION OPHTHALMIC at 09:01

## 2021-01-07 RX ADMIN — DOCUSATE SODIUM 100 MG: 100 CAPSULE, LIQUID FILLED ORAL at 08:01

## 2021-01-07 RX ADMIN — GUAIFENESIN 600 MG: 600 TABLET, EXTENDED RELEASE ORAL at 09:01

## 2021-01-07 RX ADMIN — LEVOTHYROXINE SODIUM 88 MCG: 88 TABLET ORAL at 05:01

## 2021-01-07 RX ADMIN — AMIODARONE HYDROCHLORIDE 150 MG: 1.5 INJECTION, SOLUTION INTRAVENOUS at 05:01

## 2021-01-07 RX ADMIN — ALLOPURINOL 100 MG: 100 TABLET ORAL at 09:01

## 2021-01-07 RX ADMIN — GUAIFENESIN 600 MG: 600 TABLET, EXTENDED RELEASE ORAL at 08:01

## 2021-01-07 RX ADMIN — FUROSEMIDE 20 MG: 10 INJECTION, SOLUTION INTRAMUSCULAR; INTRAVENOUS at 02:01

## 2021-01-07 RX ADMIN — FUROSEMIDE 20 MG: 10 INJECTION, SOLUTION INTRAMUSCULAR; INTRAVENOUS at 08:01

## 2021-01-07 RX ADMIN — AMIODARONE HYDROCHLORIDE 1 MG/MIN: 1.8 INJECTION, SOLUTION INTRAVENOUS at 05:01

## 2021-01-07 RX ADMIN — BRIMONIDINE TARTRATE 1 DROP: 1.5 SOLUTION OPHTHALMIC at 08:01

## 2021-01-08 LAB
ANION GAP SERPL CALC-SCNC: 8 MMOL/L (ref 8–16)
ANISOCYTOSIS BLD QL SMEAR: ABNORMAL
BASOPHILS NFR BLD: 1 % (ref 0–1.9)
BNP SERPL-MCNC: 1268 PG/ML (ref 0–99)
BUN SERPL-MCNC: 19 MG/DL (ref 8–23)
CALCIUM SERPL-MCNC: 7.9 MG/DL (ref 8.7–10.5)
CHLORIDE SERPL-SCNC: 106 MMOL/L (ref 95–110)
CO2 SERPL-SCNC: 27 MMOL/L (ref 23–29)
CREAT SERPL-MCNC: 0.9 MG/DL (ref 0.5–1.4)
DACRYOCYTES BLD QL SMEAR: ABNORMAL
DIFFERENTIAL METHOD: ABNORMAL
EOSINOPHIL NFR BLD: 4 % (ref 0–8)
ERYTHROCYTE [DISTWIDTH] IN BLOOD BY AUTOMATED COUNT: 28 % (ref 11.5–14.5)
EST. GFR  (AFRICAN AMERICAN): >60 ML/MIN/1.73 M^2
EST. GFR  (NON AFRICAN AMERICAN): >60 ML/MIN/1.73 M^2
GIANT PLATELETS BLD QL SMEAR: PRESENT
GLUCOSE SERPL-MCNC: 111 MG/DL (ref 70–110)
HCT VFR BLD AUTO: 31.1 % (ref 40–54)
HGB BLD-MCNC: 9.1 G/DL (ref 14–18)
HYPOCHROMIA BLD QL SMEAR: ABNORMAL
IMM GRANULOCYTES # BLD AUTO: ABNORMAL K/UL (ref 0–0.04)
IMM GRANULOCYTES NFR BLD AUTO: ABNORMAL % (ref 0–0.5)
LYMPHOCYTES NFR BLD: 5 % (ref 18–48)
MAGNESIUM SERPL-MCNC: 1.8 MG/DL (ref 1.6–2.6)
MCH RBC QN AUTO: 21.3 PG (ref 27–31)
MCHC RBC AUTO-ENTMCNC: 29.3 G/DL (ref 32–36)
MCV RBC AUTO: 73 FL (ref 82–98)
MONOCYTES NFR BLD: 2 % (ref 4–15)
NEUTROPHILS NFR BLD: 82 % (ref 38–73)
NEUTS BAND NFR BLD MANUAL: 6 %
NRBC BLD-RTO: 0 /100 WBC
OVALOCYTES BLD QL SMEAR: ABNORMAL
PLATELET # BLD AUTO: 839 K/UL (ref 150–350)
PLATELET BLD QL SMEAR: ABNORMAL
PMV BLD AUTO: 11.4 FL (ref 9.2–12.9)
POIKILOCYTOSIS BLD QL SMEAR: ABNORMAL
POLYCHROMASIA BLD QL SMEAR: ABNORMAL
POTASSIUM SERPL-SCNC: 3.3 MMOL/L (ref 3.5–5.1)
RBC # BLD AUTO: 4.27 M/UL (ref 4.6–6.2)
SODIUM SERPL-SCNC: 141 MMOL/L (ref 136–145)
SPHEROCYTES BLD QL SMEAR: ABNORMAL
WBC # BLD AUTO: 23.61 K/UL (ref 3.9–12.7)

## 2021-01-08 PROCEDURE — 63600175 PHARM REV CODE 636 W HCPCS: Performed by: THORACIC SURGERY (CARDIOTHORACIC VASCULAR SURGERY)

## 2021-01-08 PROCEDURE — 25000003 PHARM REV CODE 250: Performed by: SPECIALIST

## 2021-01-08 PROCEDURE — 80048 BASIC METABOLIC PNL TOTAL CA: CPT

## 2021-01-08 PROCEDURE — 85027 COMPLETE CBC AUTOMATED: CPT

## 2021-01-08 PROCEDURE — 83735 ASSAY OF MAGNESIUM: CPT

## 2021-01-08 PROCEDURE — 25000003 PHARM REV CODE 250: Performed by: INTERNAL MEDICINE

## 2021-01-08 PROCEDURE — 97530 THERAPEUTIC ACTIVITIES: CPT

## 2021-01-08 PROCEDURE — 99900035 HC TECH TIME PER 15 MIN (STAT)

## 2021-01-08 PROCEDURE — 97116 GAIT TRAINING THERAPY: CPT

## 2021-01-08 PROCEDURE — 63600175 PHARM REV CODE 636 W HCPCS: Performed by: SPECIALIST

## 2021-01-08 PROCEDURE — 83880 ASSAY OF NATRIURETIC PEPTIDE: CPT

## 2021-01-08 PROCEDURE — 94761 N-INVAS EAR/PLS OXIMETRY MLT: CPT

## 2021-01-08 PROCEDURE — 21000000 HC CCU ICU ROOM CHARGE

## 2021-01-08 PROCEDURE — 25000003 PHARM REV CODE 250: Performed by: NURSE PRACTITIONER

## 2021-01-08 PROCEDURE — 85007 BL SMEAR W/DIFF WBC COUNT: CPT

## 2021-01-08 PROCEDURE — A4216 STERILE WATER/SALINE, 10 ML: HCPCS | Performed by: SPECIALIST

## 2021-01-08 PROCEDURE — 25000003 PHARM REV CODE 250: Performed by: THORACIC SURGERY (CARDIOTHORACIC VASCULAR SURGERY)

## 2021-01-08 RX ORDER — POTASSIUM CHLORIDE 20 MEQ/1
20 TABLET, EXTENDED RELEASE ORAL ONCE
Status: COMPLETED | OUTPATIENT
Start: 2021-01-08 | End: 2021-01-08

## 2021-01-08 RX ORDER — MAGNESIUM SULFATE HEPTAHYDRATE 40 MG/ML
4 INJECTION, SOLUTION INTRAVENOUS
Status: DISCONTINUED | OUTPATIENT
Start: 2021-01-08 | End: 2021-01-11 | Stop reason: HOSPADM

## 2021-01-08 RX ORDER — FUROSEMIDE 10 MG/ML
20 INJECTION INTRAMUSCULAR; INTRAVENOUS ONCE
Status: COMPLETED | OUTPATIENT
Start: 2021-01-08 | End: 2021-01-08

## 2021-01-08 RX ORDER — MAGNESIUM SULFATE HEPTAHYDRATE 40 MG/ML
2 INJECTION, SOLUTION INTRAVENOUS
Status: DISCONTINUED | OUTPATIENT
Start: 2021-01-08 | End: 2021-01-11 | Stop reason: HOSPADM

## 2021-01-08 RX ORDER — POTASSIUM CHLORIDE 7.45 MG/ML
40 INJECTION INTRAVENOUS
Status: DISCONTINUED | OUTPATIENT
Start: 2021-01-08 | End: 2021-01-11 | Stop reason: HOSPADM

## 2021-01-08 RX ORDER — POTASSIUM CHLORIDE 20 MEQ/1
20 TABLET, EXTENDED RELEASE ORAL
Status: DISCONTINUED | OUTPATIENT
Start: 2021-01-08 | End: 2021-01-11 | Stop reason: HOSPADM

## 2021-01-08 RX ORDER — AMIODARONE HYDROCHLORIDE 200 MG/1
200 TABLET ORAL 2 TIMES DAILY
Status: DISCONTINUED | OUTPATIENT
Start: 2021-01-09 | End: 2021-01-11 | Stop reason: HOSPADM

## 2021-01-08 RX ORDER — POTASSIUM CHLORIDE 20 MEQ/1
20 TABLET, EXTENDED RELEASE ORAL DAILY
Status: DISCONTINUED | OUTPATIENT
Start: 2021-01-08 | End: 2021-01-11 | Stop reason: HOSPADM

## 2021-01-08 RX ORDER — POTASSIUM CHLORIDE 20 MEQ/1
40 TABLET, EXTENDED RELEASE ORAL
Status: DISCONTINUED | OUTPATIENT
Start: 2021-01-08 | End: 2021-01-11 | Stop reason: HOSPADM

## 2021-01-08 RX ORDER — FUROSEMIDE 40 MG/1
40 TABLET ORAL DAILY
Status: COMPLETED | OUTPATIENT
Start: 2021-01-09 | End: 2021-01-11

## 2021-01-08 RX ORDER — POTASSIUM CHLORIDE 7.45 MG/ML
20 INJECTION INTRAVENOUS
Status: DISCONTINUED | OUTPATIENT
Start: 2021-01-08 | End: 2021-01-11 | Stop reason: HOSPADM

## 2021-01-08 RX ORDER — METOPROLOL TARTRATE 25 MG/1
25 TABLET, FILM COATED ORAL 2 TIMES DAILY
Status: DISCONTINUED | OUTPATIENT
Start: 2021-01-09 | End: 2021-01-11 | Stop reason: HOSPADM

## 2021-01-08 RX ORDER — MAGNESIUM SULFATE 1 G/100ML
1 INJECTION INTRAVENOUS
Status: DISCONTINUED | OUTPATIENT
Start: 2021-01-08 | End: 2021-01-11 | Stop reason: HOSPADM

## 2021-01-08 RX ORDER — AMIODARONE HYDROCHLORIDE 200 MG/1
400 TABLET ORAL 2 TIMES DAILY
Status: COMPLETED | OUTPATIENT
Start: 2021-01-08 | End: 2021-01-08

## 2021-01-08 RX ADMIN — LATANOPROST 1 DROP: 50 SOLUTION OPHTHALMIC at 08:01

## 2021-01-08 RX ADMIN — BRIMONIDINE TARTRATE 1 DROP: 1.5 SOLUTION OPHTHALMIC at 03:01

## 2021-01-08 RX ADMIN — HYDROCODONE BITARTRATE AND ACETAMINOPHEN 1 TABLET: 5; 325 TABLET ORAL at 08:01

## 2021-01-08 RX ADMIN — BRIMONIDINE TARTRATE 1 DROP: 1.5 SOLUTION OPHTHALMIC at 08:01

## 2021-01-08 RX ADMIN — POTASSIUM CHLORIDE 40 MEQ: 20 TABLET, EXTENDED RELEASE ORAL at 08:01

## 2021-01-08 RX ADMIN — SODIUM CHLORIDE, PRESERVATIVE FREE 2 ML: 5 INJECTION INTRAVENOUS at 08:01

## 2021-01-08 RX ADMIN — THERA TABS 1 TABLET: TAB at 08:01

## 2021-01-08 RX ADMIN — SODIUM CHLORIDE, PRESERVATIVE FREE 2 ML: 5 INJECTION INTRAVENOUS at 06:01

## 2021-01-08 RX ADMIN — ALLOPURINOL 100 MG: 100 TABLET ORAL at 08:01

## 2021-01-08 RX ADMIN — HYDROCODONE BITARTRATE AND ACETAMINOPHEN 1 TABLET: 5; 325 TABLET ORAL at 05:01

## 2021-01-08 RX ADMIN — DOCUSATE SODIUM 100 MG: 100 CAPSULE, LIQUID FILLED ORAL at 08:01

## 2021-01-08 RX ADMIN — DONEPEZIL HYDROCHLORIDE 10 MG: 5 TABLET, FILM COATED ORAL at 08:01

## 2021-01-08 RX ADMIN — FUROSEMIDE 20 MG: 10 INJECTION, SOLUTION INTRAMUSCULAR; INTRAVENOUS at 05:01

## 2021-01-08 RX ADMIN — ASPIRIN 325 MG ORAL TABLET 325 MG: 325 PILL ORAL at 08:01

## 2021-01-08 RX ADMIN — METOPROLOL TARTRATE 50 MG: 50 TABLET, FILM COATED ORAL at 08:01

## 2021-01-08 RX ADMIN — AMIODARONE HYDROCHLORIDE 400 MG: 200 TABLET ORAL at 03:01

## 2021-01-08 RX ADMIN — PANTOPRAZOLE SODIUM 40 MG: 40 TABLET, DELAYED RELEASE ORAL at 05:01

## 2021-01-08 RX ADMIN — POLYETHYLENE GLYCOL 3350 17 G: 17 POWDER, FOR SOLUTION ORAL at 08:01

## 2021-01-08 RX ADMIN — GUAIFENESIN 600 MG: 600 TABLET, EXTENDED RELEASE ORAL at 08:01

## 2021-01-08 RX ADMIN — AMIODARONE HYDROCHLORIDE 400 MG: 200 TABLET ORAL at 08:01

## 2021-01-08 RX ADMIN — SODIUM CHLORIDE, PRESERVATIVE FREE 2 ML: 5 INJECTION INTRAVENOUS at 12:01

## 2021-01-08 RX ADMIN — POTASSIUM CHLORIDE 20 MEQ: 20 TABLET, EXTENDED RELEASE ORAL at 09:01

## 2021-01-08 RX ADMIN — ROSUVASTATIN CALCIUM 10 MG: 10 TABLET, FILM COATED ORAL at 08:01

## 2021-01-08 RX ADMIN — LEVOTHYROXINE SODIUM 88 MCG: 88 TABLET ORAL at 05:01

## 2021-01-08 RX ADMIN — FUROSEMIDE 20 MG: 10 INJECTION, SOLUTION INTRAMUSCULAR; INTRAVENOUS at 09:01

## 2021-01-08 RX ADMIN — POTASSIUM CHLORIDE 20 MEQ: 20 TABLET, EXTENDED RELEASE ORAL at 03:01

## 2021-01-09 LAB
ANION GAP SERPL CALC-SCNC: 5 MMOL/L (ref 8–16)
BUN SERPL-MCNC: 20 MG/DL (ref 8–23)
CALCIUM SERPL-MCNC: 7.9 MG/DL (ref 8.7–10.5)
CHLORIDE SERPL-SCNC: 106 MMOL/L (ref 95–110)
CO2 SERPL-SCNC: 29 MMOL/L (ref 23–29)
CREAT SERPL-MCNC: 0.9 MG/DL (ref 0.5–1.4)
EST. GFR  (AFRICAN AMERICAN): >60 ML/MIN/1.73 M^2
EST. GFR  (NON AFRICAN AMERICAN): >60 ML/MIN/1.73 M^2
GLUCOSE SERPL-MCNC: 111 MG/DL (ref 70–110)
MAGNESIUM SERPL-MCNC: 1.8 MG/DL (ref 1.6–2.6)
POTASSIUM SERPL-SCNC: 3.7 MMOL/L (ref 3.5–5.1)
SODIUM SERPL-SCNC: 140 MMOL/L (ref 136–145)

## 2021-01-09 PROCEDURE — 97535 SELF CARE MNGMENT TRAINING: CPT

## 2021-01-09 PROCEDURE — 94761 N-INVAS EAR/PLS OXIMETRY MLT: CPT

## 2021-01-09 PROCEDURE — 25000003 PHARM REV CODE 250: Performed by: SPECIALIST

## 2021-01-09 PROCEDURE — 83735 ASSAY OF MAGNESIUM: CPT

## 2021-01-09 PROCEDURE — 25000003 PHARM REV CODE 250: Performed by: NURSE PRACTITIONER

## 2021-01-09 PROCEDURE — 97530 THERAPEUTIC ACTIVITIES: CPT | Mod: CQ

## 2021-01-09 PROCEDURE — 80048 BASIC METABOLIC PNL TOTAL CA: CPT

## 2021-01-09 PROCEDURE — A4216 STERILE WATER/SALINE, 10 ML: HCPCS | Performed by: SPECIALIST

## 2021-01-09 PROCEDURE — 97165 OT EVAL LOW COMPLEX 30 MIN: CPT

## 2021-01-09 PROCEDURE — 97530 THERAPEUTIC ACTIVITIES: CPT

## 2021-01-09 PROCEDURE — 25000003 PHARM REV CODE 250: Performed by: INTERNAL MEDICINE

## 2021-01-09 PROCEDURE — 99900035 HC TECH TIME PER 15 MIN (STAT)

## 2021-01-09 PROCEDURE — 21000000 HC CCU ICU ROOM CHARGE

## 2021-01-09 PROCEDURE — 25000003 PHARM REV CODE 250: Performed by: THORACIC SURGERY (CARDIOTHORACIC VASCULAR SURGERY)

## 2021-01-09 PROCEDURE — 97116 GAIT TRAINING THERAPY: CPT | Mod: CQ

## 2021-01-09 RX ORDER — ACETAMINOPHEN 325 MG/1
650 TABLET ORAL EVERY 6 HOURS PRN
Status: DISCONTINUED | OUTPATIENT
Start: 2021-01-09 | End: 2021-01-11 | Stop reason: HOSPADM

## 2021-01-09 RX ORDER — CEFUROXIME AXETIL 250 MG/1
500 TABLET ORAL EVERY 12 HOURS
Status: DISCONTINUED | OUTPATIENT
Start: 2021-01-09 | End: 2021-01-11 | Stop reason: HOSPADM

## 2021-01-09 RX ADMIN — ACETAMINOPHEN 650 MG: 325 TABLET, FILM COATED ORAL at 09:01

## 2021-01-09 RX ADMIN — DOCUSATE SODIUM 100 MG: 100 CAPSULE, LIQUID FILLED ORAL at 08:01

## 2021-01-09 RX ADMIN — POTASSIUM CHLORIDE 20 MEQ: 20 TABLET, EXTENDED RELEASE ORAL at 08:01

## 2021-01-09 RX ADMIN — BRIMONIDINE TARTRATE 1 DROP: 1.5 SOLUTION OPHTHALMIC at 08:01

## 2021-01-09 RX ADMIN — POLYETHYLENE GLYCOL 3350 17 G: 17 POWDER, FOR SOLUTION ORAL at 08:01

## 2021-01-09 RX ADMIN — CEFUROXIME AXETIL 500 MG: 250 TABLET, FILM COATED ORAL at 04:01

## 2021-01-09 RX ADMIN — METOPROLOL TARTRATE 25 MG: 25 TABLET, FILM COATED ORAL at 08:01

## 2021-01-09 RX ADMIN — HYDROCODONE BITARTRATE AND ACETAMINOPHEN 1 TABLET: 5; 325 TABLET ORAL at 06:01

## 2021-01-09 RX ADMIN — PANTOPRAZOLE SODIUM 40 MG: 40 TABLET, DELAYED RELEASE ORAL at 06:01

## 2021-01-09 RX ADMIN — THERA TABS 1 TABLET: TAB at 08:01

## 2021-01-09 RX ADMIN — FUROSEMIDE 40 MG: 40 TABLET ORAL at 08:01

## 2021-01-09 RX ADMIN — ACETAMINOPHEN 650 MG: 325 TABLET, FILM COATED ORAL at 08:01

## 2021-01-09 RX ADMIN — GUAIFENESIN 600 MG: 600 TABLET, EXTENDED RELEASE ORAL at 08:01

## 2021-01-09 RX ADMIN — DONEPEZIL HYDROCHLORIDE 10 MG: 5 TABLET, FILM COATED ORAL at 08:01

## 2021-01-09 RX ADMIN — AMIODARONE HYDROCHLORIDE 200 MG: 200 TABLET ORAL at 08:01

## 2021-01-09 RX ADMIN — LEVOTHYROXINE SODIUM 88 MCG: 88 TABLET ORAL at 06:01

## 2021-01-09 RX ADMIN — BRIMONIDINE TARTRATE 1 DROP: 1.5 SOLUTION OPHTHALMIC at 04:01

## 2021-01-09 RX ADMIN — SODIUM CHLORIDE, PRESERVATIVE FREE 2 ML: 5 INJECTION INTRAVENOUS at 06:01

## 2021-01-09 RX ADMIN — LATANOPROST 1 DROP: 50 SOLUTION OPHTHALMIC at 08:01

## 2021-01-09 RX ADMIN — ROSUVASTATIN CALCIUM 10 MG: 10 TABLET, FILM COATED ORAL at 08:01

## 2021-01-09 RX ADMIN — ASPIRIN 325 MG ORAL TABLET 325 MG: 325 PILL ORAL at 08:01

## 2021-01-09 RX ADMIN — ALLOPURINOL 100 MG: 100 TABLET ORAL at 08:01

## 2021-01-10 LAB
ANION GAP SERPL CALC-SCNC: 9 MMOL/L (ref 8–16)
BASOPHILS # BLD AUTO: 0.08 K/UL (ref 0–0.2)
BASOPHILS NFR BLD: 0.4 % (ref 0–1.9)
BUN SERPL-MCNC: 20 MG/DL (ref 8–23)
CALCIUM SERPL-MCNC: 8 MG/DL (ref 8.7–10.5)
CHLORIDE SERPL-SCNC: 104 MMOL/L (ref 95–110)
CO2 SERPL-SCNC: 27 MMOL/L (ref 23–29)
CREAT SERPL-MCNC: 0.9 MG/DL (ref 0.5–1.4)
DIFFERENTIAL METHOD: ABNORMAL
EOSINOPHIL # BLD AUTO: 0.5 K/UL (ref 0–0.5)
EOSINOPHIL NFR BLD: 2.5 % (ref 0–8)
ERYTHROCYTE [DISTWIDTH] IN BLOOD BY AUTOMATED COUNT: 31.2 % (ref 11.5–14.5)
EST. GFR  (AFRICAN AMERICAN): >60 ML/MIN/1.73 M^2
EST. GFR  (NON AFRICAN AMERICAN): >60 ML/MIN/1.73 M^2
GLUCOSE SERPL-MCNC: 101 MG/DL (ref 70–110)
HCT VFR BLD AUTO: 30.9 % (ref 40–54)
HGB BLD-MCNC: 9 G/DL (ref 14–18)
IMM GRANULOCYTES # BLD AUTO: 0.41 K/UL (ref 0–0.04)
IMM GRANULOCYTES NFR BLD AUTO: 2.3 % (ref 0–0.5)
LYMPHOCYTES # BLD AUTO: 1.4 K/UL (ref 1–4.8)
LYMPHOCYTES NFR BLD: 7.9 % (ref 18–48)
MAGNESIUM SERPL-MCNC: 1.8 MG/DL (ref 1.6–2.6)
MCH RBC QN AUTO: 22 PG (ref 27–31)
MCHC RBC AUTO-ENTMCNC: 29.1 G/DL (ref 32–36)
MCV RBC AUTO: 76 FL (ref 82–98)
MONOCYTES # BLD AUTO: 0.8 K/UL (ref 0.3–1)
MONOCYTES NFR BLD: 4.5 % (ref 4–15)
NEUTROPHILS # BLD AUTO: 14.9 K/UL (ref 1.8–7.7)
NEUTROPHILS NFR BLD: 82.4 % (ref 38–73)
NRBC BLD-RTO: 0 /100 WBC
PLATELET # BLD AUTO: 688 K/UL (ref 150–350)
PMV BLD AUTO: 11.1 FL (ref 9.2–12.9)
POTASSIUM SERPL-SCNC: 4.2 MMOL/L (ref 3.5–5.1)
PROCALCITONIN SERPL IA-MCNC: 0.13 NG/ML (ref 0–0.5)
RBC # BLD AUTO: 4.09 M/UL (ref 4.6–6.2)
SODIUM SERPL-SCNC: 140 MMOL/L (ref 136–145)
WBC # BLD AUTO: 18.12 K/UL (ref 3.9–12.7)

## 2021-01-10 PROCEDURE — 25000003 PHARM REV CODE 250: Performed by: NURSE PRACTITIONER

## 2021-01-10 PROCEDURE — 94761 N-INVAS EAR/PLS OXIMETRY MLT: CPT

## 2021-01-10 PROCEDURE — 85025 COMPLETE CBC W/AUTO DIFF WBC: CPT

## 2021-01-10 PROCEDURE — 94799 UNLISTED PULMONARY SVC/PX: CPT

## 2021-01-10 PROCEDURE — 83735 ASSAY OF MAGNESIUM: CPT

## 2021-01-10 PROCEDURE — 25000003 PHARM REV CODE 250: Performed by: THORACIC SURGERY (CARDIOTHORACIC VASCULAR SURGERY)

## 2021-01-10 PROCEDURE — 80048 BASIC METABOLIC PNL TOTAL CA: CPT

## 2021-01-10 PROCEDURE — 25000003 PHARM REV CODE 250: Performed by: SPECIALIST

## 2021-01-10 PROCEDURE — 21000000 HC CCU ICU ROOM CHARGE

## 2021-01-10 PROCEDURE — A4216 STERILE WATER/SALINE, 10 ML: HCPCS | Performed by: SPECIALIST

## 2021-01-10 PROCEDURE — 84145 PROCALCITONIN (PCT): CPT

## 2021-01-10 PROCEDURE — 97530 THERAPEUTIC ACTIVITIES: CPT | Mod: CQ

## 2021-01-10 PROCEDURE — 99900035 HC TECH TIME PER 15 MIN (STAT)

## 2021-01-10 PROCEDURE — 97116 GAIT TRAINING THERAPY: CPT | Mod: CQ

## 2021-01-10 RX ORDER — BRIMONIDINE TARTRATE 1.5 MG/ML
1 SOLUTION/ DROPS OPHTHALMIC 2 TIMES DAILY
Status: DISCONTINUED | OUTPATIENT
Start: 2021-01-10 | End: 2021-01-11 | Stop reason: HOSPADM

## 2021-01-10 RX ADMIN — AMIODARONE HYDROCHLORIDE 200 MG: 200 TABLET ORAL at 08:01

## 2021-01-10 RX ADMIN — PANTOPRAZOLE SODIUM 40 MG: 40 TABLET, DELAYED RELEASE ORAL at 05:01

## 2021-01-10 RX ADMIN — LEVOTHYROXINE SODIUM 88 MCG: 88 TABLET ORAL at 05:01

## 2021-01-10 RX ADMIN — ALLOPURINOL 100 MG: 100 TABLET ORAL at 09:01

## 2021-01-10 RX ADMIN — METOPROLOL TARTRATE 25 MG: 25 TABLET, FILM COATED ORAL at 08:01

## 2021-01-10 RX ADMIN — SODIUM CHLORIDE, PRESERVATIVE FREE 2 ML: 5 INJECTION INTRAVENOUS at 06:01

## 2021-01-10 RX ADMIN — GUAIFENESIN 600 MG: 600 TABLET, EXTENDED RELEASE ORAL at 08:01

## 2021-01-10 RX ADMIN — SODIUM CHLORIDE, PRESERVATIVE FREE 2 ML: 5 INJECTION INTRAVENOUS at 12:01

## 2021-01-10 RX ADMIN — CEFUROXIME AXETIL 500 MG: 250 TABLET, FILM COATED ORAL at 08:01

## 2021-01-10 RX ADMIN — THERA TABS 1 TABLET: TAB at 09:01

## 2021-01-10 RX ADMIN — LATANOPROST 1 DROP: 50 SOLUTION OPHTHALMIC at 08:01

## 2021-01-10 RX ADMIN — DONEPEZIL HYDROCHLORIDE 10 MG: 5 TABLET, FILM COATED ORAL at 08:01

## 2021-01-10 RX ADMIN — AMIODARONE HYDROCHLORIDE 200 MG: 200 TABLET ORAL at 09:01

## 2021-01-10 RX ADMIN — BRIMONIDINE TARTRATE 1 DROP: 1.5 SOLUTION/ DROPS OPHTHALMIC at 08:01

## 2021-01-10 RX ADMIN — CEFUROXIME AXETIL 500 MG: 250 TABLET, FILM COATED ORAL at 09:01

## 2021-01-10 RX ADMIN — SODIUM CHLORIDE, PRESERVATIVE FREE 2 ML: 5 INJECTION INTRAVENOUS at 05:01

## 2021-01-10 RX ADMIN — ASPIRIN 325 MG ORAL TABLET 325 MG: 325 PILL ORAL at 09:01

## 2021-01-10 RX ADMIN — ROSUVASTATIN CALCIUM 10 MG: 10 TABLET, FILM COATED ORAL at 09:01

## 2021-01-10 RX ADMIN — METOPROLOL TARTRATE 25 MG: 25 TABLET, FILM COATED ORAL at 09:01

## 2021-01-10 RX ADMIN — FUROSEMIDE 40 MG: 40 TABLET ORAL at 09:01

## 2021-01-10 RX ADMIN — GUAIFENESIN 600 MG: 600 TABLET, EXTENDED RELEASE ORAL at 09:01

## 2021-01-10 RX ADMIN — POTASSIUM CHLORIDE 20 MEQ: 20 TABLET, EXTENDED RELEASE ORAL at 09:01

## 2021-01-10 RX ADMIN — BRIMONIDINE TARTRATE 1 DROP: 1.5 SOLUTION OPHTHALMIC at 09:01

## 2021-01-11 ENCOUNTER — TELEPHONE (OUTPATIENT)
Dept: HEMATOLOGY/ONCOLOGY | Facility: HOSPITAL | Age: 81
End: 2021-01-11

## 2021-01-11 VITALS
TEMPERATURE: 98 F | HEIGHT: 70 IN | OXYGEN SATURATION: 95 % | BODY MASS INDEX: 23.51 KG/M2 | HEART RATE: 83 BPM | RESPIRATION RATE: 20 BRPM | WEIGHT: 164.25 LBS | SYSTOLIC BLOOD PRESSURE: 151 MMHG | DIASTOLIC BLOOD PRESSURE: 67 MMHG

## 2021-01-11 DIAGNOSIS — D62 ACUTE BLOOD LOSS ANEMIA: ICD-10-CM

## 2021-01-11 DIAGNOSIS — D50.0 IRON DEFICIENCY ANEMIA DUE TO CHRONIC BLOOD LOSS: ICD-10-CM

## 2021-01-11 DIAGNOSIS — I48.0 PAROXYSMAL ATRIAL FIBRILLATION: Primary | ICD-10-CM

## 2021-01-11 LAB
ANION GAP SERPL CALC-SCNC: 7 MMOL/L (ref 8–16)
BASOPHILS # BLD AUTO: 0.11 K/UL (ref 0–0.2)
BASOPHILS NFR BLD: 0.5 % (ref 0–1.9)
BUN SERPL-MCNC: 17 MG/DL (ref 8–23)
CALCIUM SERPL-MCNC: 7.9 MG/DL (ref 8.7–10.5)
CHLORIDE SERPL-SCNC: 100 MMOL/L (ref 95–110)
CO2 SERPL-SCNC: 27 MMOL/L (ref 23–29)
CREAT SERPL-MCNC: 1 MG/DL (ref 0.5–1.4)
DIFFERENTIAL METHOD: ABNORMAL
EOSINOPHIL # BLD AUTO: 0.4 K/UL (ref 0–0.5)
EOSINOPHIL NFR BLD: 2.2 % (ref 0–8)
ERYTHROCYTE [DISTWIDTH] IN BLOOD BY AUTOMATED COUNT: 31.3 % (ref 11.5–14.5)
EST. GFR  (AFRICAN AMERICAN): >60 ML/MIN/1.73 M^2
EST. GFR  (NON AFRICAN AMERICAN): >60 ML/MIN/1.73 M^2
GLUCOSE SERPL-MCNC: 109 MG/DL (ref 70–110)
HCT VFR BLD AUTO: 32.3 % (ref 40–54)
HGB BLD-MCNC: 9.3 G/DL (ref 14–18)
IMM GRANULOCYTES # BLD AUTO: 0.33 K/UL (ref 0–0.04)
IMM GRANULOCYTES NFR BLD AUTO: 1.6 % (ref 0–0.5)
LYMPHOCYTES # BLD AUTO: 1.4 K/UL (ref 1–4.8)
LYMPHOCYTES NFR BLD: 7 % (ref 18–48)
MAGNESIUM SERPL-MCNC: 1.8 MG/DL (ref 1.6–2.6)
MCH RBC QN AUTO: 22 PG (ref 27–31)
MCHC RBC AUTO-ENTMCNC: 28.8 G/DL (ref 32–36)
MCV RBC AUTO: 76 FL (ref 82–98)
MONOCYTES # BLD AUTO: 0.9 K/UL (ref 0.3–1)
MONOCYTES NFR BLD: 4.6 % (ref 4–15)
NEUTROPHILS # BLD AUTO: 17 K/UL (ref 1.8–7.7)
NEUTROPHILS NFR BLD: 84.1 % (ref 38–73)
NRBC BLD-RTO: 0 /100 WBC
PLATELET # BLD AUTO: 753 K/UL (ref 150–350)
PMV BLD AUTO: 11.5 FL (ref 9.2–12.9)
POTASSIUM SERPL-SCNC: 4.2 MMOL/L (ref 3.5–5.1)
RBC # BLD AUTO: 4.23 M/UL (ref 4.6–6.2)
SODIUM SERPL-SCNC: 134 MMOL/L (ref 136–145)
WBC # BLD AUTO: 20.21 K/UL (ref 3.9–12.7)

## 2021-01-11 PROCEDURE — 25000003 PHARM REV CODE 250: Performed by: SPECIALIST

## 2021-01-11 PROCEDURE — 99900035 HC TECH TIME PER 15 MIN (STAT)

## 2021-01-11 PROCEDURE — 94761 N-INVAS EAR/PLS OXIMETRY MLT: CPT

## 2021-01-11 PROCEDURE — 83735 ASSAY OF MAGNESIUM: CPT

## 2021-01-11 PROCEDURE — A4216 STERILE WATER/SALINE, 10 ML: HCPCS | Performed by: SPECIALIST

## 2021-01-11 PROCEDURE — 85025 COMPLETE CBC W/AUTO DIFF WBC: CPT

## 2021-01-11 PROCEDURE — 25000003 PHARM REV CODE 250: Performed by: NURSE PRACTITIONER

## 2021-01-11 PROCEDURE — 25000003 PHARM REV CODE 250: Performed by: INTERNAL MEDICINE

## 2021-01-11 PROCEDURE — 25000003 PHARM REV CODE 250: Performed by: THORACIC SURGERY (CARDIOTHORACIC VASCULAR SURGERY)

## 2021-01-11 PROCEDURE — 80048 BASIC METABOLIC PNL TOTAL CA: CPT

## 2021-01-11 RX ORDER — METOPROLOL TARTRATE 25 MG/1
25 TABLET, FILM COATED ORAL 2 TIMES DAILY
Qty: 60 TABLET | Refills: 1 | Status: SHIPPED | OUTPATIENT
Start: 2021-01-11 | End: 2021-03-15 | Stop reason: SDUPTHER

## 2021-01-11 RX ORDER — DOCUSATE SODIUM 100 MG/1
100 CAPSULE, LIQUID FILLED ORAL 2 TIMES DAILY
Qty: 60 CAPSULE | Refills: 0 | Status: SHIPPED | OUTPATIENT
Start: 2021-01-11

## 2021-01-11 RX ORDER — AMIODARONE HYDROCHLORIDE 200 MG/1
200 TABLET ORAL 2 TIMES DAILY
Qty: 60 TABLET | Refills: 1 | Status: SHIPPED | OUTPATIENT
Start: 2021-01-11 | End: 2021-02-08

## 2021-01-11 RX ORDER — CEFUROXIME AXETIL 500 MG/1
500 TABLET ORAL EVERY 12 HOURS
Qty: 12 TABLET | Refills: 0 | Status: SHIPPED | OUTPATIENT
Start: 2021-01-11 | End: 2021-02-08

## 2021-01-11 RX ORDER — LANOLIN ALCOHOL/MO/W.PET/CERES
400 CREAM (GRAM) TOPICAL ONCE
Status: COMPLETED | OUTPATIENT
Start: 2021-01-11 | End: 2021-01-11

## 2021-01-11 RX ADMIN — LEVOTHYROXINE SODIUM 88 MCG: 88 TABLET ORAL at 05:01

## 2021-01-11 RX ADMIN — CEFUROXIME AXETIL 500 MG: 250 TABLET, FILM COATED ORAL at 09:01

## 2021-01-11 RX ADMIN — THERA TABS 1 TABLET: TAB at 09:01

## 2021-01-11 RX ADMIN — AMIODARONE HYDROCHLORIDE 200 MG: 200 TABLET ORAL at 09:01

## 2021-01-11 RX ADMIN — SODIUM CHLORIDE, PRESERVATIVE FREE 2 ML: 5 INJECTION INTRAVENOUS at 12:01

## 2021-01-11 RX ADMIN — MAGNESIUM OXIDE 400 MG: 400 TABLET ORAL at 09:01

## 2021-01-11 RX ADMIN — ACETAMINOPHEN 650 MG: 325 TABLET, FILM COATED ORAL at 01:01

## 2021-01-11 RX ADMIN — METOPROLOL TARTRATE 25 MG: 25 TABLET, FILM COATED ORAL at 09:01

## 2021-01-11 RX ADMIN — BRIMONIDINE TARTRATE 1 DROP: 1.5 SOLUTION/ DROPS OPHTHALMIC at 09:01

## 2021-01-11 RX ADMIN — FUROSEMIDE 40 MG: 40 TABLET ORAL at 09:01

## 2021-01-11 RX ADMIN — PANTOPRAZOLE SODIUM 40 MG: 40 TABLET, DELAYED RELEASE ORAL at 05:01

## 2021-01-11 RX ADMIN — ASPIRIN 325 MG ORAL TABLET 325 MG: 325 PILL ORAL at 09:01

## 2021-01-11 RX ADMIN — SODIUM CHLORIDE, PRESERVATIVE FREE 2 ML: 5 INJECTION INTRAVENOUS at 06:01

## 2021-01-11 RX ADMIN — GUAIFENESIN 600 MG: 600 TABLET, EXTENDED RELEASE ORAL at 09:01

## 2021-01-11 RX ADMIN — POTASSIUM CHLORIDE 20 MEQ: 20 TABLET, EXTENDED RELEASE ORAL at 09:01

## 2021-01-11 RX ADMIN — ALLOPURINOL 100 MG: 100 TABLET ORAL at 09:01

## 2021-01-11 RX ADMIN — ROSUVASTATIN CALCIUM 10 MG: 10 TABLET, FILM COATED ORAL at 09:01

## 2021-01-12 ENCOUNTER — TELEPHONE (OUTPATIENT)
Dept: HEMATOLOGY/ONCOLOGY | Facility: CLINIC | Age: 81
End: 2021-01-12

## 2021-01-13 LAB — CATH EF QUANTITATIVE: 45 %

## 2021-01-18 PROCEDURE — 99999 HC NO LEVEL OF SERVICE - ED ONLY: CPT

## 2021-01-19 ENCOUNTER — HOSPITAL ENCOUNTER (EMERGENCY)
Facility: HOSPITAL | Age: 81
Discharge: LEFT BEFORE DISCHARGE | End: 2021-01-19
Payer: MEDICARE

## 2021-01-19 VITALS
HEART RATE: 75 BPM | RESPIRATION RATE: 16 BRPM | BODY MASS INDEX: 24.46 KG/M2 | DIASTOLIC BLOOD PRESSURE: 75 MMHG | TEMPERATURE: 98 F | OXYGEN SATURATION: 99 % | WEIGHT: 165.13 LBS | HEIGHT: 69 IN | SYSTOLIC BLOOD PRESSURE: 152 MMHG

## 2021-01-19 DIAGNOSIS — R07.9 CHEST PAIN IN ADULT: ICD-10-CM

## 2021-01-19 PROCEDURE — 93005 ELECTROCARDIOGRAM TRACING: CPT | Performed by: INTERNAL MEDICINE

## 2021-01-19 PROCEDURE — 93010 EKG 12-LEAD: ICD-10-PCS | Mod: ,,, | Performed by: INTERNAL MEDICINE

## 2021-01-19 PROCEDURE — 93010 ELECTROCARDIOGRAM REPORT: CPT | Mod: ,,, | Performed by: INTERNAL MEDICINE

## 2021-01-28 ENCOUNTER — EXTERNAL HOME HEALTH (OUTPATIENT)
Dept: HOME HEALTH SERVICES | Facility: HOSPITAL | Age: 81
End: 2021-01-28

## 2021-01-29 ENCOUNTER — TELEPHONE (OUTPATIENT)
Dept: FAMILY MEDICINE | Facility: CLINIC | Age: 81
End: 2021-01-29

## 2021-02-01 ENCOUNTER — OFFICE VISIT (OUTPATIENT)
Dept: FAMILY MEDICINE | Facility: CLINIC | Age: 81
End: 2021-02-01
Payer: MEDICARE

## 2021-02-01 VITALS
SYSTOLIC BLOOD PRESSURE: 122 MMHG | DIASTOLIC BLOOD PRESSURE: 68 MMHG | BODY MASS INDEX: 21.33 KG/M2 | WEIGHT: 144 LBS | HEIGHT: 69 IN | HEART RATE: 60 BPM

## 2021-02-01 DIAGNOSIS — E78.2 MIXED HYPERLIPIDEMIA: Chronic | ICD-10-CM

## 2021-02-01 DIAGNOSIS — I10 ESSENTIAL HYPERTENSION: Chronic | ICD-10-CM

## 2021-02-01 DIAGNOSIS — R63.4 WEIGHT LOSS: ICD-10-CM

## 2021-02-01 DIAGNOSIS — I21.4 NSTEMI (NON-ST ELEVATED MYOCARDIAL INFARCTION): ICD-10-CM

## 2021-02-01 DIAGNOSIS — Z87.891 FORMER SMOKER: ICD-10-CM

## 2021-02-01 DIAGNOSIS — G31.84 MCI (MILD COGNITIVE IMPAIRMENT): ICD-10-CM

## 2021-02-01 DIAGNOSIS — G47.33 OSA ON CPAP: Chronic | ICD-10-CM

## 2021-02-01 DIAGNOSIS — Z95.1 S/P CABG X 3: Primary | ICD-10-CM

## 2021-02-01 DIAGNOSIS — Z95.1 S/P CABG (CORONARY ARTERY BYPASS GRAFT): Chronic | ICD-10-CM

## 2021-02-01 DIAGNOSIS — J84.10 PULMONARY FIBROSIS, UNSPECIFIED: ICD-10-CM

## 2021-02-01 DIAGNOSIS — I48.0 PAROXYSMAL ATRIAL FIBRILLATION: ICD-10-CM

## 2021-02-01 DIAGNOSIS — H40.1131 PRIMARY OPEN ANGLE GLAUCOMA OF BOTH EYES, MILD STAGE: ICD-10-CM

## 2021-02-01 DIAGNOSIS — D45 POLYCYTHEMIA VERA: Chronic | ICD-10-CM

## 2021-02-01 DIAGNOSIS — I65.23 ATHEROSCLEROSIS OF BOTH CAROTID ARTERIES: ICD-10-CM

## 2021-02-01 PROCEDURE — 1159F PR MEDICATION LIST DOCUMENTED IN MEDICAL RECORD: ICD-10-PCS | Mod: S$GLB,,, | Performed by: FAMILY MEDICINE

## 2021-02-01 PROCEDURE — 3074F PR MOST RECENT SYSTOLIC BLOOD PRESSURE < 130 MM HG: ICD-10-PCS | Mod: S$GLB,,, | Performed by: FAMILY MEDICINE

## 2021-02-01 PROCEDURE — 99214 PR OFFICE/OUTPT VISIT, EST, LEVL IV, 30-39 MIN: ICD-10-PCS | Mod: S$GLB,,, | Performed by: FAMILY MEDICINE

## 2021-02-01 PROCEDURE — 1111F PR DISCHARGE MEDS RECONCILED W/ CURRENT OUTPATIENT MED LIST: ICD-10-PCS | Mod: S$GLB,,, | Performed by: FAMILY MEDICINE

## 2021-02-01 PROCEDURE — 1111F DSCHRG MED/CURRENT MED MERGE: CPT | Mod: S$GLB,,, | Performed by: FAMILY MEDICINE

## 2021-02-01 PROCEDURE — 1159F MED LIST DOCD IN RCRD: CPT | Mod: S$GLB,,, | Performed by: FAMILY MEDICINE

## 2021-02-01 PROCEDURE — 99214 OFFICE O/P EST MOD 30 MIN: CPT | Mod: S$GLB,,, | Performed by: FAMILY MEDICINE

## 2021-02-01 PROCEDURE — 3078F PR MOST RECENT DIASTOLIC BLOOD PRESSURE < 80 MM HG: ICD-10-PCS | Mod: S$GLB,,, | Performed by: FAMILY MEDICINE

## 2021-02-01 PROCEDURE — 3074F SYST BP LT 130 MM HG: CPT | Mod: S$GLB,,, | Performed by: FAMILY MEDICINE

## 2021-02-01 PROCEDURE — 3078F DIAST BP <80 MM HG: CPT | Mod: S$GLB,,, | Performed by: FAMILY MEDICINE

## 2021-02-01 RX ORDER — MEGESTROL ACETATE 40 MG/1
40 TABLET ORAL DAILY
Qty: 30 TABLET | Refills: 0 | Status: SHIPPED | OUTPATIENT
Start: 2021-02-01 | End: 2021-02-08 | Stop reason: SDUPTHER

## 2021-02-03 ENCOUNTER — DOCUMENT SCAN (OUTPATIENT)
Dept: HOME HEALTH SERVICES | Facility: HOSPITAL | Age: 81
End: 2021-02-03

## 2021-02-04 RX ORDER — FUROSEMIDE 20 MG/1
20 TABLET ORAL DAILY
Qty: 30 TABLET | Refills: 0 | Status: SHIPPED | OUTPATIENT
Start: 2021-02-04 | End: 2021-03-16 | Stop reason: SDUPTHER

## 2021-02-04 RX ORDER — DEXAMETHASONE 6 MG/1
6 TABLET ORAL 2 TIMES DAILY WITH MEALS
Qty: 30 TABLET | Refills: 0 | Status: ON HOLD | OUTPATIENT
Start: 2021-02-04 | End: 2021-02-16 | Stop reason: HOSPADM

## 2021-02-04 RX ORDER — AZITHROMYCIN 250 MG/1
TABLET, FILM COATED ORAL
Qty: 6 TABLET | Refills: 0 | Status: SHIPPED | OUTPATIENT
Start: 2021-02-04 | End: 2021-02-09

## 2021-02-08 ENCOUNTER — TELEPHONE (OUTPATIENT)
Dept: PULMONOLOGY | Facility: CLINIC | Age: 81
End: 2021-02-08

## 2021-02-08 ENCOUNTER — OFFICE VISIT (OUTPATIENT)
Dept: FAMILY MEDICINE | Facility: CLINIC | Age: 81
End: 2021-02-08
Payer: MEDICARE

## 2021-02-08 VITALS
HEIGHT: 69 IN | WEIGHT: 143 LBS | SYSTOLIC BLOOD PRESSURE: 126 MMHG | OXYGEN SATURATION: 99 % | DIASTOLIC BLOOD PRESSURE: 66 MMHG | BODY MASS INDEX: 21.18 KG/M2 | HEART RATE: 60 BPM

## 2021-02-08 DIAGNOSIS — K21.9 GASTRO-ESOPHAGEAL REFLUX DISEASE WITHOUT ESOPHAGITIS: ICD-10-CM

## 2021-02-08 DIAGNOSIS — M1A.0790 IDIOPATHIC CHRONIC GOUT OF FOOT WITHOUT TOPHUS, UNSPECIFIED LATERALITY: ICD-10-CM

## 2021-02-08 DIAGNOSIS — D64.9 ANEMIA, UNSPECIFIED TYPE: ICD-10-CM

## 2021-02-08 DIAGNOSIS — E03.9 ACQUIRED HYPOTHYROIDISM: ICD-10-CM

## 2021-02-08 DIAGNOSIS — I25.10 CORONARY ARTERY DISEASE INVOLVING NATIVE CORONARY ARTERY WITHOUT ANGINA PECTORIS, UNSPECIFIED WHETHER NATIVE OR TRANSPLANTED HEART: ICD-10-CM

## 2021-02-08 DIAGNOSIS — R63.4 WEIGHT LOSS: ICD-10-CM

## 2021-02-08 DIAGNOSIS — Z95.1 S/P CABG (CORONARY ARTERY BYPASS GRAFT): ICD-10-CM

## 2021-02-08 DIAGNOSIS — I48.0 PAROXYSMAL A-FIB: ICD-10-CM

## 2021-02-08 DIAGNOSIS — R91.8 BILATERAL PULMONARY INFILTRATES ON CHEST X-RAY: Primary | ICD-10-CM

## 2021-02-08 PROCEDURE — U0005 INFEC AGEN DETEC AMPLI PROBE: HCPCS

## 2021-02-08 PROCEDURE — 1159F PR MEDICATION LIST DOCUMENTED IN MEDICAL RECORD: ICD-10-PCS | Mod: S$GLB,,, | Performed by: NURSE PRACTITIONER

## 2021-02-08 PROCEDURE — 99214 OFFICE O/P EST MOD 30 MIN: CPT | Mod: S$GLB,,, | Performed by: NURSE PRACTITIONER

## 2021-02-08 PROCEDURE — U0003 INFECTIOUS AGENT DETECTION BY NUCLEIC ACID (DNA OR RNA); SEVERE ACUTE RESPIRATORY SYNDROME CORONAVIRUS 2 (SARS-COV-2) (CORONAVIRUS DISEASE [COVID-19]), AMPLIFIED PROBE TECHNIQUE, MAKING USE OF HIGH THROUGHPUT TECHNOLOGIES AS DESCRIBED BY CMS-2020-01-R: HCPCS

## 2021-02-08 PROCEDURE — 3078F DIAST BP <80 MM HG: CPT | Mod: S$GLB,,, | Performed by: NURSE PRACTITIONER

## 2021-02-08 PROCEDURE — 1111F PR DISCHARGE MEDS RECONCILED W/ CURRENT OUTPATIENT MED LIST: ICD-10-PCS | Mod: S$GLB,,, | Performed by: NURSE PRACTITIONER

## 2021-02-08 PROCEDURE — 1159F MED LIST DOCD IN RCRD: CPT | Mod: S$GLB,,, | Performed by: NURSE PRACTITIONER

## 2021-02-08 PROCEDURE — 3074F SYST BP LT 130 MM HG: CPT | Mod: S$GLB,,, | Performed by: NURSE PRACTITIONER

## 2021-02-08 PROCEDURE — 99214 PR OFFICE/OUTPT VISIT, EST, LEVL IV, 30-39 MIN: ICD-10-PCS | Mod: S$GLB,,, | Performed by: NURSE PRACTITIONER

## 2021-02-08 PROCEDURE — 3078F PR MOST RECENT DIASTOLIC BLOOD PRESSURE < 80 MM HG: ICD-10-PCS | Mod: S$GLB,,, | Performed by: NURSE PRACTITIONER

## 2021-02-08 PROCEDURE — 1111F DSCHRG MED/CURRENT MED MERGE: CPT | Mod: S$GLB,,, | Performed by: NURSE PRACTITIONER

## 2021-02-08 PROCEDURE — 3074F PR MOST RECENT SYSTOLIC BLOOD PRESSURE < 130 MM HG: ICD-10-PCS | Mod: S$GLB,,, | Performed by: NURSE PRACTITIONER

## 2021-02-08 RX ORDER — OMEPRAZOLE 20 MG/1
20 CAPSULE, DELAYED RELEASE ORAL DAILY
Qty: 90 CAPSULE | Refills: 1 | Status: SHIPPED | OUTPATIENT
Start: 2021-02-08 | End: 2021-03-16 | Stop reason: SDUPTHER

## 2021-02-08 RX ORDER — MEGESTROL ACETATE 40 MG/1
40 TABLET ORAL DAILY
Qty: 30 TABLET | Refills: 3 | Status: SHIPPED | OUTPATIENT
Start: 2021-02-08 | End: 2021-03-01 | Stop reason: SDUPTHER

## 2021-02-08 RX ORDER — LEVOTHYROXINE SODIUM 88 UG/1
88 TABLET ORAL
Qty: 90 TABLET | Refills: 1 | Status: SHIPPED | OUTPATIENT
Start: 2021-02-08 | End: 2021-03-16 | Stop reason: SDUPTHER

## 2021-02-08 RX ORDER — ALLOPURINOL 100 MG/1
100 TABLET ORAL DAILY
Qty: 90 TABLET | Refills: 1 | Status: SHIPPED | OUTPATIENT
Start: 2021-02-08 | End: 2021-03-16 | Stop reason: SDUPTHER

## 2021-02-09 ENCOUNTER — HOSPITAL ENCOUNTER (EMERGENCY)
Facility: HOSPITAL | Age: 81
Discharge: HOME | End: 2021-02-09
Attending: EMERGENCY MEDICINE
Payer: MEDICARE

## 2021-02-09 ENCOUNTER — TELEPHONE (OUTPATIENT)
Dept: FAMILY MEDICINE | Facility: CLINIC | Age: 81
End: 2021-02-09

## 2021-02-09 VITALS
WEIGHT: 142 LBS | TEMPERATURE: 98 F | HEIGHT: 69 IN | RESPIRATION RATE: 18 BRPM | HEART RATE: 66 BPM | SYSTOLIC BLOOD PRESSURE: 186 MMHG | BODY MASS INDEX: 21.03 KG/M2 | DIASTOLIC BLOOD PRESSURE: 86 MMHG | OXYGEN SATURATION: 99 %

## 2021-02-09 DIAGNOSIS — D69.6 THROMBOCYTOPENIA: ICD-10-CM

## 2021-02-09 DIAGNOSIS — D75.1 POLYCYTHEMIA: Primary | ICD-10-CM

## 2021-02-09 LAB
ALBUMIN SERPL BCP-MCNC: 4.5 G/DL (ref 3.5–5.2)
ALBUMIN SERPL-MCNC: 4.7 G/DL (ref 3.6–5.1)
ALBUMIN/GLOB SERPL: 2 (CALC) (ref 1–2.5)
ALP SERPL-CCNC: 71 U/L (ref 55–135)
ALP SERPL-CCNC: 82 U/L (ref 35–144)
ALT SERPL W/O P-5'-P-CCNC: 30 U/L (ref 10–44)
ALT SERPL-CCNC: 23 U/L (ref 9–46)
ANION GAP SERPL CALC-SCNC: 12 MMOL/L (ref 8–16)
AST SERPL-CCNC: 20 U/L (ref 10–35)
AST SERPL-CCNC: 21 U/L (ref 10–40)
BASOPHILS # BLD AUTO: 0.05 K/UL (ref 0–0.2)
BASOPHILS # BLD AUTO: 354 CELLS/UL (ref 0–200)
BASOPHILS NFR BLD AUTO: 1 %
BASOPHILS NFR BLD: 0.2 % (ref 0–1.9)
BILIRUB SERPL-MCNC: 0.6 MG/DL (ref 0.2–1.2)
BILIRUB SERPL-MCNC: 0.9 MG/DL (ref 0.1–1)
BNP SERPL-MCNC: 480 PG/ML
BUN SERPL-MCNC: 27 MG/DL (ref 8–23)
BUN SERPL-MCNC: 29 MG/DL (ref 7–25)
BUN/CREAT SERPL: 23 (CALC) (ref 6–22)
CALCIUM SERPL-MCNC: 10.5 MG/DL (ref 8.6–10.3)
CALCIUM SERPL-MCNC: 9.7 MG/DL (ref 8.7–10.5)
CHLORIDE SERPL-SCNC: 96 MMOL/L (ref 98–110)
CHLORIDE SERPL-SCNC: 97 MMOL/L (ref 95–110)
CO2 SERPL-SCNC: 23 MMOL/L (ref 20–32)
CO2 SERPL-SCNC: 24 MMOL/L (ref 23–29)
CREAT SERPL-MCNC: 1.1 MG/DL (ref 0.5–1.4)
CREAT SERPL-MCNC: 1.25 MG/DL (ref 0.7–1.18)
CRP SERPL-MCNC: 3.5 MG/L
DIFFERENTIAL METHOD: ABNORMAL
EOSINOPHIL # BLD AUTO: 0 CELLS/UL (ref 15–500)
EOSINOPHIL # BLD AUTO: 0 K/UL (ref 0–0.5)
EOSINOPHIL NFR BLD AUTO: 0 %
EOSINOPHIL NFR BLD: 0 % (ref 0–8)
ERYTHROCYTE [DISTWIDTH] IN BLOOD BY AUTOMATED COUNT: 24.3 % (ref 11–15)
ERYTHROCYTE [DISTWIDTH] IN BLOOD BY AUTOMATED COUNT: 25.1 % (ref 11.5–14.5)
ERYTHROCYTE [SEDIMENTATION RATE] IN BLOOD BY WESTERGREN METHOD: 2 MM/H
EST. GFR  (AFRICAN AMERICAN): >60 ML/MIN/1.73 M^2
EST. GFR  (NON AFRICAN AMERICAN): >60 ML/MIN/1.73 M^2
FERRITIN SERPL-MCNC: 26 NG/ML (ref 24–380)
GFRSERPLBLD MDRD-ARVRAT: 54 ML/MIN/1.73M2
GLOBULIN SER CALC-MCNC: 2.4 G/DL (CALC) (ref 1.9–3.7)
GLUCOSE SERPL-MCNC: 104 MG/DL (ref 70–110)
GLUCOSE SERPL-MCNC: 91 MG/DL (ref 65–99)
HCT VFR BLD AUTO: 45.2 % (ref 40–54)
HCT VFR BLD AUTO: 47.2 % (ref 38.5–50)
HGB BLD-MCNC: 13 G/DL (ref 13.2–17.1)
HGB BLD-MCNC: 13.3 G/DL (ref 14–18)
IMM GRANULOCYTES # BLD AUTO: 0.63 K/UL (ref 0–0.04)
IMM GRANULOCYTES NFR BLD AUTO: 2.1 % (ref 0–0.5)
LYMPHOCYTES # BLD AUTO: 1.3 K/UL (ref 1–4.8)
LYMPHOCYTES # BLD MANUAL: 1416 CELLS/UL (ref 850–3900)
LYMPHOCYTES NFR BLD AUTO: 4 %
LYMPHOCYTES NFR BLD: 4.1 % (ref 18–48)
MAGNESIUM SERPL-MCNC: 1.9 MG/DL (ref 1.6–2.6)
MCH RBC QN AUTO: 20.7 PG (ref 27–33)
MCH RBC QN AUTO: 21 PG (ref 27–31)
MCHC RBC AUTO-ENTMCNC: 27.5 G/DL (ref 32–36)
MCHC RBC AUTO-ENTMCNC: 29.4 G/DL (ref 32–36)
MCV RBC AUTO: 71 FL (ref 82–98)
MCV RBC AUTO: 75 FL (ref 80–100)
MONOCYTES # BLD AUTO: 0.7 K/UL (ref 0.3–1)
MONOCYTES # BLD AUTO: 1062 CELLS/UL (ref 200–950)
MONOCYTES NFR BLD AUTO: 3 %
MONOCYTES NFR BLD: 2.4 % (ref 4–15)
NEUTROPHILS # BLD AUTO: 27.6 K/UL (ref 1.8–7.7)
NEUTROPHILS # BLD AUTO: ABNORMAL CELLS/UL (ref 1500–7800)
NEUTROPHILS NFR BLD AUTO: 92 %
NEUTROPHILS NFR BLD: 91.2 % (ref 38–73)
NOTE: ABNORMAL
NRBC BLD-RTO: 0 /100 WBC
PLATELET # BLD AUTO: 2102 K/UL (ref 150–350)
PLATELET # BLD AUTO: 2197 THOUSAND/UL (ref 140–400)
PLATELET BLD QL SMEAR: ABNORMAL
PMV BLD AUTO: 10.1 FL (ref 9.2–12.9)
PMV BLD REES-ECKER: 11 FL (ref 7.5–12.5)
POTASSIUM SERPL-SCNC: 4.7 MMOL/L (ref 3.5–5.1)
POTASSIUM SERPL-SCNC: 7.5 MMOL/L (ref 3.5–5.3)
PROT SERPL-MCNC: 7.1 G/DL (ref 6.1–8.1)
PROT SERPL-MCNC: 7.3 G/DL (ref 6–8.4)
RBC # BLD AUTO: 6.29 MILLION/UL (ref 4.2–5.8)
RBC # BLD AUTO: 6.33 M/UL (ref 4.6–6.2)
SARS-COV-2 RNA RESP QL NAA+PROBE: NOT DETECTED
SODIUM SERPL-SCNC: 131 MMOL/L (ref 135–146)
SODIUM SERPL-SCNC: 133 MMOL/L (ref 136–145)
TSH SERPL DL<=0.005 MIU/L-ACNC: 1.59 UIU/ML (ref 0.34–5.6)
WBC # BLD AUTO: 30.26 K/UL (ref 3.9–12.7)
WBC # BLD AUTO: 35.4 THOUSAND/UL (ref 3.8–10.8)

## 2021-02-09 PROCEDURE — 96360 HYDRATION IV INFUSION INIT: CPT

## 2021-02-09 PROCEDURE — 83735 ASSAY OF MAGNESIUM: CPT

## 2021-02-09 PROCEDURE — 84443 ASSAY THYROID STIM HORMONE: CPT

## 2021-02-09 PROCEDURE — 80053 COMPREHEN METABOLIC PANEL: CPT

## 2021-02-09 PROCEDURE — 96361 HYDRATE IV INFUSION ADD-ON: CPT | Mod: GZ

## 2021-02-09 PROCEDURE — 99284 EMERGENCY DEPT VISIT MOD MDM: CPT | Mod: 25

## 2021-02-09 PROCEDURE — 85025 COMPLETE CBC W/AUTO DIFF WBC: CPT

## 2021-02-09 PROCEDURE — 36415 COLL VENOUS BLD VENIPUNCTURE: CPT

## 2021-02-09 PROCEDURE — 63600175 PHARM REV CODE 636 W HCPCS: Performed by: EMERGENCY MEDICINE

## 2021-02-09 RX ORDER — HYDROXYUREA 500 MG/1
500 CAPSULE ORAL 2 TIMES DAILY
Qty: 60 CAPSULE | Refills: 0 | Status: ON HOLD | OUTPATIENT
Start: 2021-02-09 | End: 2021-02-15

## 2021-02-09 RX ORDER — SODIUM CHLORIDE, SODIUM LACTATE, POTASSIUM CHLORIDE, CALCIUM CHLORIDE 600; 310; 30; 20 MG/100ML; MG/100ML; MG/100ML; MG/100ML
500 INJECTION, SOLUTION INTRAVENOUS
Status: COMPLETED | OUTPATIENT
Start: 2021-02-09 | End: 2021-02-09

## 2021-02-09 RX ADMIN — SODIUM CHLORIDE, SODIUM LACTATE, POTASSIUM CHLORIDE, AND CALCIUM CHLORIDE 500 ML: .6; .31; .03; .02 INJECTION, SOLUTION INTRAVENOUS at 03:02

## 2021-02-10 ENCOUNTER — TELEPHONE (OUTPATIENT)
Dept: FAMILY MEDICINE | Facility: CLINIC | Age: 81
End: 2021-02-10

## 2021-02-11 ENCOUNTER — TELEPHONE (OUTPATIENT)
Dept: HEMATOLOGY/ONCOLOGY | Facility: CLINIC | Age: 81
End: 2021-02-11

## 2021-02-11 ENCOUNTER — TELEPHONE (OUTPATIENT)
Dept: FAMILY MEDICINE | Facility: CLINIC | Age: 81
End: 2021-02-11

## 2021-02-11 DIAGNOSIS — R91.8 BILATERAL PULMONARY INFILTRATES ON CHEST X-RAY: Primary | ICD-10-CM

## 2021-02-11 DIAGNOSIS — Z95.1 S/P CABG (CORONARY ARTERY BYPASS GRAFT): ICD-10-CM

## 2021-02-12 DIAGNOSIS — D64.9 ANEMIA, UNSPECIFIED TYPE: Primary | ICD-10-CM

## 2021-02-13 LAB
BASOPHILS # BLD AUTO: 138 CELLS/UL (ref 0–200)
BASOPHILS NFR BLD AUTO: 0.3 %
EOSINOPHIL # BLD AUTO: 0 CELLS/UL (ref 15–500)
EOSINOPHIL NFR BLD AUTO: 0 %
ERYTHROCYTE [DISTWIDTH] IN BLOOD BY AUTOMATED COUNT: 24.6 % (ref 11–15)
HCT VFR BLD AUTO: 45.6 % (ref 38.5–50)
HGB BLD-MCNC: 13.2 G/DL (ref 13.2–17.1)
LYMPHOCYTES # BLD AUTO: 1242 CELLS/UL (ref 850–3900)
LYMPHOCYTES NFR BLD AUTO: 2.7 %
MCH RBC QN AUTO: 21.2 PG (ref 27–33)
MCHC RBC AUTO-ENTMCNC: 28.9 G/DL (ref 32–36)
MCV RBC AUTO: 73.2 FL (ref 80–100)
MONOCYTES # BLD AUTO: 460 CELLS/UL (ref 200–950)
MONOCYTES NFR BLD AUTO: 1 %
NEUTROPHILS # BLD AUTO: ABNORMAL CELLS/UL (ref 1500–7800)
NEUTROPHILS NFR BLD AUTO: 96 %
PLATELET # BLD AUTO: 2595 THOUSAND/UL (ref 140–400)
PMV BLD REES-ECKER: 10.7 FL (ref 7.5–12.5)
RBC # BLD AUTO: 6.23 MILLION/UL (ref 4.2–5.8)
SERVICE CMNT-IMP: ABNORMAL
WBC # BLD AUTO: 46 THOUSAND/UL (ref 3.8–10.8)

## 2021-02-15 ENCOUNTER — HOSPITAL ENCOUNTER (OUTPATIENT)
Facility: HOSPITAL | Age: 81
Discharge: HOME HEALTH | End: 2021-02-16
Attending: EMERGENCY MEDICINE | Admitting: INTERNAL MEDICINE
Payer: MEDICARE

## 2021-02-15 DIAGNOSIS — R53.1 GENERALIZED WEAKNESS: ICD-10-CM

## 2021-02-15 DIAGNOSIS — R07.9 CHEST PAIN: ICD-10-CM

## 2021-02-15 DIAGNOSIS — D72.829 LEUKOCYTOSIS, UNSPECIFIED TYPE: ICD-10-CM

## 2021-02-15 DIAGNOSIS — D75.1 POLYCYTHEMIA: ICD-10-CM

## 2021-02-15 DIAGNOSIS — R53.1 GENERALIZED WEAKNESS: Primary | ICD-10-CM

## 2021-02-15 DIAGNOSIS — D75.839 THROMBOCYTOSIS: ICD-10-CM

## 2021-02-15 PROBLEM — I50.32 CHRONIC DIASTOLIC HEART FAILURE: Chronic | Status: ACTIVE | Noted: 2021-02-15

## 2021-02-15 PROBLEM — I27.20 PULMONARY HYPERTENSION: Chronic | Status: ACTIVE | Noted: 2021-02-15

## 2021-02-15 PROBLEM — E87.1 HYPONATREMIA: Chronic | Status: ACTIVE | Noted: 2021-02-15

## 2021-02-15 LAB
ALBUMIN SERPL BCP-MCNC: 4 G/DL (ref 3.5–5.2)
ALP SERPL-CCNC: 51 U/L (ref 55–135)
ALT SERPL W/O P-5'-P-CCNC: 28 U/L (ref 10–44)
ANION GAP SERPL CALC-SCNC: 12 MMOL/L (ref 8–16)
ANISOCYTOSIS BLD QL SMEAR: ABNORMAL
AST SERPL-CCNC: 17 U/L (ref 10–40)
BASOPHILS NFR BLD: 0 % (ref 0–1.9)
BILIRUB SERPL-MCNC: 0.8 MG/DL (ref 0.1–1)
BILIRUB UR QL STRIP: NEGATIVE
BNP SERPL-MCNC: 318 PG/ML (ref 0–99)
BUN SERPL-MCNC: 36 MG/DL (ref 8–23)
BURR CELLS BLD QL SMEAR: ABNORMAL
CALCIUM SERPL-MCNC: 8.7 MG/DL (ref 8.7–10.5)
CHLORIDE SERPL-SCNC: 97 MMOL/L (ref 95–110)
CLARITY UR: CLEAR
CO2 SERPL-SCNC: 21 MMOL/L (ref 23–29)
COLOR UR: YELLOW
CREAT SERPL-MCNC: 1 MG/DL (ref 0.5–1.4)
CRP SERPL-MCNC: 0.04 MG/DL
DIFFERENTIAL METHOD: ABNORMAL
EOSINOPHIL NFR BLD: 0 % (ref 0–8)
ERYTHROCYTE [DISTWIDTH] IN BLOOD BY AUTOMATED COUNT: 25.1 % (ref 11.5–14.5)
ERYTHROCYTE [SEDIMENTATION RATE] IN BLOOD BY WESTERGREN METHOD: 1 MM/HR (ref 0–10)
EST. GFR  (AFRICAN AMERICAN): >60 ML/MIN/1.73 M^2
EST. GFR  (NON AFRICAN AMERICAN): >60 ML/MIN/1.73 M^2
FERRITIN SERPL-MCNC: 23 NG/ML (ref 20–300)
GLUCOSE SERPL-MCNC: 157 MG/DL (ref 70–110)
GLUCOSE UR QL STRIP: NEGATIVE
HCT VFR BLD AUTO: 44.3 % (ref 40–54)
HGB BLD-MCNC: 13.3 G/DL (ref 14–18)
HGB UR QL STRIP: NEGATIVE
IMM GRANULOCYTES # BLD AUTO: ABNORMAL K/UL (ref 0–0.04)
IMM GRANULOCYTES NFR BLD AUTO: ABNORMAL % (ref 0–0.5)
INR PPP: 1.6
KETONES UR QL STRIP: NEGATIVE
LEUKOCYTE ESTERASE UR QL STRIP: NEGATIVE
LYMPHOCYTES NFR BLD: 7 % (ref 18–48)
MAGNESIUM SERPL-MCNC: 1.8 MG/DL (ref 1.6–2.6)
MCH RBC QN AUTO: 21.1 PG (ref 27–31)
MCHC RBC AUTO-ENTMCNC: 30 G/DL (ref 32–36)
MCV RBC AUTO: 70 FL (ref 82–98)
MONOCYTES NFR BLD: 2 % (ref 4–15)
NEUTROPHILS NFR BLD: 85 % (ref 38–73)
NEUTS BAND NFR BLD MANUAL: 6 %
NITRITE UR QL STRIP: NEGATIVE
NRBC BLD-RTO: 0 /100 WBC
OVALOCYTES BLD QL SMEAR: ABNORMAL
PH UR STRIP: 6 [PH] (ref 5–8)
PLATELET # BLD AUTO: 1863 K/UL (ref 150–350)
PLATELET BLD QL SMEAR: ABNORMAL
PMV BLD AUTO: 9.9 FL (ref 9.2–12.9)
POIKILOCYTOSIS BLD QL SMEAR: SLIGHT
POTASSIUM SERPL-SCNC: 3.7 MMOL/L (ref 3.5–5.1)
PROT SERPL-MCNC: 6.2 G/DL (ref 6–8.4)
PROT UR QL STRIP: ABNORMAL
PROTHROMBIN TIME: 18 SEC (ref 10.6–14.8)
RBC # BLD AUTO: 6.29 M/UL (ref 4.6–6.2)
SARS-COV-2 RDRP RESP QL NAA+PROBE: NEGATIVE
SCHISTOCYTES BLD QL SMEAR: PRESENT
SODIUM SERPL-SCNC: 130 MMOL/L (ref 136–145)
SP GR UR STRIP: 1.02 (ref 1–1.03)
TROPONIN I SERPL DL<=0.01 NG/ML-MCNC: <0.03 NG/ML
TSH SERPL DL<=0.005 MIU/L-ACNC: 0.95 UIU/ML (ref 0.34–5.6)
URN SPEC COLLECT METH UR: ABNORMAL
UROBILINOGEN UR STRIP-ACNC: NEGATIVE EU/DL
WBC # BLD AUTO: 31.4 K/UL (ref 3.9–12.7)

## 2021-02-15 PROCEDURE — 93010 EKG 12-LEAD: ICD-10-PCS | Mod: ,,, | Performed by: INTERNAL MEDICINE

## 2021-02-15 PROCEDURE — 99285 EMERGENCY DEPT VISIT HI MDM: CPT | Mod: 25

## 2021-02-15 PROCEDURE — G0378 HOSPITAL OBSERVATION PER HR: HCPCS

## 2021-02-15 PROCEDURE — 85651 RBC SED RATE NONAUTOMATED: CPT

## 2021-02-15 PROCEDURE — 85610 PROTHROMBIN TIME: CPT

## 2021-02-15 PROCEDURE — 85007 BL SMEAR W/DIFF WBC COUNT: CPT

## 2021-02-15 PROCEDURE — 84443 ASSAY THYROID STIM HORMONE: CPT

## 2021-02-15 PROCEDURE — 86140 C-REACTIVE PROTEIN: CPT

## 2021-02-15 PROCEDURE — 82728 ASSAY OF FERRITIN: CPT

## 2021-02-15 PROCEDURE — 25000003 PHARM REV CODE 250: Performed by: INTERNAL MEDICINE

## 2021-02-15 PROCEDURE — 83880 ASSAY OF NATRIURETIC PEPTIDE: CPT

## 2021-02-15 PROCEDURE — 85027 COMPLETE CBC AUTOMATED: CPT

## 2021-02-15 PROCEDURE — 84484 ASSAY OF TROPONIN QUANT: CPT

## 2021-02-15 PROCEDURE — U0002 COVID-19 LAB TEST NON-CDC: HCPCS

## 2021-02-15 PROCEDURE — 81003 URINALYSIS AUTO W/O SCOPE: CPT

## 2021-02-15 PROCEDURE — 96372 THER/PROPH/DIAG INJ SC/IM: CPT | Mod: 59

## 2021-02-15 PROCEDURE — 63600175 PHARM REV CODE 636 W HCPCS: Performed by: INTERNAL MEDICINE

## 2021-02-15 PROCEDURE — 36415 COLL VENOUS BLD VENIPUNCTURE: CPT

## 2021-02-15 PROCEDURE — 93005 ELECTROCARDIOGRAM TRACING: CPT | Performed by: INTERNAL MEDICINE

## 2021-02-15 PROCEDURE — 83735 ASSAY OF MAGNESIUM: CPT

## 2021-02-15 PROCEDURE — 80053 COMPREHEN METABOLIC PANEL: CPT

## 2021-02-15 PROCEDURE — 93010 ELECTROCARDIOGRAM REPORT: CPT | Mod: ,,, | Performed by: INTERNAL MEDICINE

## 2021-02-15 RX ORDER — HYDROXYUREA 500 MG/1
500 CAPSULE ORAL 2 TIMES DAILY
Status: DISCONTINUED | OUTPATIENT
Start: 2021-02-15 | End: 2021-02-16 | Stop reason: HOSPADM

## 2021-02-15 RX ORDER — PANTOPRAZOLE SODIUM 40 MG/1
40 TABLET, DELAYED RELEASE ORAL DAILY
Status: DISCONTINUED | OUTPATIENT
Start: 2021-02-16 | End: 2021-02-16 | Stop reason: HOSPADM

## 2021-02-15 RX ORDER — GLUCAGON 1 MG
1 KIT INJECTION
Status: DISCONTINUED | OUTPATIENT
Start: 2021-02-15 | End: 2021-02-16 | Stop reason: HOSPADM

## 2021-02-15 RX ORDER — METOPROLOL TARTRATE 25 MG/1
25 TABLET, FILM COATED ORAL 2 TIMES DAILY
Status: DISCONTINUED | OUTPATIENT
Start: 2021-02-15 | End: 2021-02-16 | Stop reason: HOSPADM

## 2021-02-15 RX ORDER — DONEPEZIL HYDROCHLORIDE 5 MG/1
10 TABLET, FILM COATED ORAL NIGHTLY
Status: DISCONTINUED | OUTPATIENT
Start: 2021-02-15 | End: 2021-02-16 | Stop reason: HOSPADM

## 2021-02-15 RX ORDER — MEGESTROL ACETATE 20 MG/1
40 TABLET ORAL DAILY
Status: DISCONTINUED | OUTPATIENT
Start: 2021-02-16 | End: 2021-02-16 | Stop reason: HOSPADM

## 2021-02-15 RX ORDER — SODIUM CHLORIDE 0.9 % (FLUSH) 0.9 %
10 SYRINGE (ML) INJECTION EVERY 6 HOURS PRN
Status: DISCONTINUED | OUTPATIENT
Start: 2021-02-15 | End: 2021-02-16 | Stop reason: HOSPADM

## 2021-02-15 RX ORDER — ENOXAPARIN SODIUM 100 MG/ML
40 INJECTION SUBCUTANEOUS
Status: DISCONTINUED | OUTPATIENT
Start: 2021-02-15 | End: 2021-02-16 | Stop reason: HOSPADM

## 2021-02-15 RX ORDER — ASPIRIN 81 MG/1
81 TABLET ORAL NIGHTLY
Status: DISCONTINUED | OUTPATIENT
Start: 2021-02-15 | End: 2021-02-16 | Stop reason: HOSPADM

## 2021-02-15 RX ORDER — ENOXAPARIN SODIUM 100 MG/ML
30 INJECTION SUBCUTANEOUS EVERY 24 HOURS
Status: DISCONTINUED | OUTPATIENT
Start: 2021-02-15 | End: 2021-02-15

## 2021-02-15 RX ORDER — SODIUM CHLORIDE 9 MG/ML
INJECTION, SOLUTION INTRAVENOUS CONTINUOUS
Status: DISCONTINUED | OUTPATIENT
Start: 2021-02-15 | End: 2021-02-16 | Stop reason: HOSPADM

## 2021-02-15 RX ORDER — ASPIRIN 325 MG
325 TABLET ORAL
Status: DISCONTINUED | OUTPATIENT
Start: 2021-02-15 | End: 2021-02-15

## 2021-02-15 RX ORDER — LEVOTHYROXINE SODIUM 88 UG/1
88 TABLET ORAL
Status: DISCONTINUED | OUTPATIENT
Start: 2021-02-16 | End: 2021-02-16 | Stop reason: HOSPADM

## 2021-02-15 RX ORDER — ROSUVASTATIN CALCIUM 10 MG/1
10 TABLET, COATED ORAL DAILY
Status: DISCONTINUED | OUTPATIENT
Start: 2021-02-15 | End: 2021-02-16 | Stop reason: HOSPADM

## 2021-02-15 RX ORDER — DOCUSATE SODIUM 100 MG/1
100 CAPSULE, LIQUID FILLED ORAL 2 TIMES DAILY
Status: DISCONTINUED | OUTPATIENT
Start: 2021-02-15 | End: 2021-02-16 | Stop reason: HOSPADM

## 2021-02-15 RX ORDER — IBUPROFEN 200 MG
24 TABLET ORAL
Status: DISCONTINUED | OUTPATIENT
Start: 2021-02-15 | End: 2021-02-16 | Stop reason: HOSPADM

## 2021-02-15 RX ORDER — IBUPROFEN 200 MG
16 TABLET ORAL
Status: DISCONTINUED | OUTPATIENT
Start: 2021-02-15 | End: 2021-02-16 | Stop reason: HOSPADM

## 2021-02-15 RX ORDER — ONDANSETRON 2 MG/ML
4 INJECTION INTRAMUSCULAR; INTRAVENOUS EVERY 8 HOURS PRN
Status: DISCONTINUED | OUTPATIENT
Start: 2021-02-15 | End: 2021-02-16 | Stop reason: HOSPADM

## 2021-02-15 RX ORDER — TALC
6 POWDER (GRAM) TOPICAL NIGHTLY PRN
Status: DISCONTINUED | OUTPATIENT
Start: 2021-02-15 | End: 2021-02-16 | Stop reason: HOSPADM

## 2021-02-15 RX ORDER — ACETAMINOPHEN 325 MG/1
650 TABLET ORAL EVERY 4 HOURS PRN
Status: DISCONTINUED | OUTPATIENT
Start: 2021-02-15 | End: 2021-02-16 | Stop reason: HOSPADM

## 2021-02-15 RX ORDER — AMOXICILLIN 250 MG
2 CAPSULE ORAL 2 TIMES DAILY PRN
Status: DISCONTINUED | OUTPATIENT
Start: 2021-02-15 | End: 2021-02-16 | Stop reason: HOSPADM

## 2021-02-15 RX ORDER — LANOLIN ALCOHOL/MO/W.PET/CERES
500 CREAM (GRAM) TOPICAL DAILY
Status: DISCONTINUED | OUTPATIENT
Start: 2021-02-16 | End: 2021-02-16 | Stop reason: HOSPADM

## 2021-02-15 RX ORDER — BRIMONIDINE TARTRATE 1.5 MG/ML
1 SOLUTION/ DROPS OPHTHALMIC 2 TIMES DAILY
Status: DISCONTINUED | OUTPATIENT
Start: 2021-02-15 | End: 2021-02-16 | Stop reason: HOSPADM

## 2021-02-15 RX ORDER — ALLOPURINOL 100 MG/1
100 TABLET ORAL DAILY
Status: DISCONTINUED | OUTPATIENT
Start: 2021-02-16 | End: 2021-02-16 | Stop reason: HOSPADM

## 2021-02-15 RX ADMIN — HYDROXYUREA 500 MG: 500 CAPSULE ORAL at 09:02

## 2021-02-15 RX ADMIN — SODIUM CHLORIDE: 0.9 INJECTION, SOLUTION INTRAVENOUS at 09:02

## 2021-02-15 RX ADMIN — DONEPEZIL HYDROCHLORIDE 10 MG: 5 TABLET, FILM COATED ORAL at 09:02

## 2021-02-15 RX ADMIN — ASPIRIN 81 MG: 81 TABLET, DELAYED RELEASE ORAL at 09:02

## 2021-02-15 RX ADMIN — ROSUVASTATIN 10 MG: 10 TABLET, FILM COATED ORAL at 09:02

## 2021-02-15 RX ADMIN — BRIMONIDINE TARTRATE 1 DROP: 1.5 SOLUTION OPHTHALMIC at 09:02

## 2021-02-15 RX ADMIN — METOPROLOL TARTRATE 25 MG: 25 TABLET, FILM COATED ORAL at 09:02

## 2021-02-15 RX ADMIN — ENOXAPARIN SODIUM 40 MG: 40 INJECTION SUBCUTANEOUS at 09:02

## 2021-02-16 VITALS
TEMPERATURE: 98 F | RESPIRATION RATE: 17 BRPM | WEIGHT: 127 LBS | HEART RATE: 65 BPM | OXYGEN SATURATION: 97 % | DIASTOLIC BLOOD PRESSURE: 76 MMHG | SYSTOLIC BLOOD PRESSURE: 156 MMHG | BODY MASS INDEX: 18.81 KG/M2 | HEIGHT: 69 IN

## 2021-02-16 PROBLEM — E43 SEVERE MALNUTRITION: Chronic | Status: ACTIVE | Noted: 2021-02-16

## 2021-02-16 LAB
ALBUMIN SERPL BCP-MCNC: 3.2 G/DL (ref 3.5–5.2)
ALP SERPL-CCNC: 42 U/L (ref 55–135)
ALT SERPL W/O P-5'-P-CCNC: 27 U/L (ref 10–44)
ANION GAP SERPL CALC-SCNC: 6 MMOL/L (ref 8–16)
AST SERPL-CCNC: 14 U/L (ref 10–40)
BASOPHILS # BLD AUTO: 0.01 K/UL (ref 0–0.2)
BASOPHILS NFR BLD: 0.1 % (ref 0–1.9)
BILIRUB SERPL-MCNC: 1.1 MG/DL (ref 0.1–1)
BUN SERPL-MCNC: 33 MG/DL (ref 8–23)
CALCIUM SERPL-MCNC: 7.9 MG/DL (ref 8.7–10.5)
CHLORIDE SERPL-SCNC: 103 MMOL/L (ref 95–110)
CO2 SERPL-SCNC: 21 MMOL/L (ref 23–29)
CREAT SERPL-MCNC: 0.7 MG/DL (ref 0.5–1.4)
DIFFERENTIAL METHOD: ABNORMAL
EOSINOPHIL # BLD AUTO: 0.2 K/UL (ref 0–0.5)
EOSINOPHIL NFR BLD: 1 % (ref 0–8)
ERYTHROCYTE [DISTWIDTH] IN BLOOD BY AUTOMATED COUNT: 24.3 % (ref 11.5–14.5)
EST. GFR  (AFRICAN AMERICAN): >60 ML/MIN/1.73 M^2
EST. GFR  (NON AFRICAN AMERICAN): >60 ML/MIN/1.73 M^2
GLUCOSE SERPL-MCNC: 92 MG/DL (ref 70–110)
HCT VFR BLD AUTO: 38.7 % (ref 40–54)
HGB BLD-MCNC: 11.8 G/DL (ref 14–18)
IMM GRANULOCYTES # BLD AUTO: 0.24 K/UL (ref 0–0.04)
IMM GRANULOCYTES NFR BLD AUTO: 1.3 % (ref 0–0.5)
INR PPP: 1.6
LYMPHOCYTES # BLD AUTO: 1.3 K/UL (ref 1–4.8)
LYMPHOCYTES NFR BLD: 7.1 % (ref 18–48)
MAGNESIUM SERPL-MCNC: 1.8 MG/DL (ref 1.6–2.6)
MCH RBC QN AUTO: 20.9 PG (ref 27–31)
MCHC RBC AUTO-ENTMCNC: 30.5 G/DL (ref 32–36)
MCV RBC AUTO: 69 FL (ref 82–98)
MISCELLANEOUS TEST NAME: NORMAL
MONOCYTES # BLD AUTO: 0.8 K/UL (ref 0.3–1)
MONOCYTES NFR BLD: 4.3 % (ref 4–15)
NEUTROPHILS # BLD AUTO: 16.2 K/UL (ref 1.8–7.7)
NEUTROPHILS NFR BLD: 86.2 % (ref 38–73)
NRBC BLD-RTO: 0 /100 WBC
PLATELET # BLD AUTO: 1447 K/UL (ref 150–350)
PMV BLD AUTO: 9.7 FL (ref 9.2–12.9)
POTASSIUM SERPL-SCNC: 4 MMOL/L (ref 3.5–5.1)
PROT SERPL-MCNC: 5.2 G/DL (ref 6–8.4)
PROTHROMBIN TIME: 18.5 SEC (ref 10.6–14.8)
RBC # BLD AUTO: 5.65 M/UL (ref 4.6–6.2)
SODIUM SERPL-SCNC: 130 MMOL/L (ref 136–145)
WBC # BLD AUTO: 18.76 K/UL (ref 3.9–12.7)

## 2021-02-16 PROCEDURE — 99214 PR OFFICE/OUTPT VISIT, EST, LEVL IV, 30-39 MIN: ICD-10-PCS | Mod: ,,, | Performed by: INTERNAL MEDICINE

## 2021-02-16 PROCEDURE — 80053 COMPREHEN METABOLIC PANEL: CPT

## 2021-02-16 PROCEDURE — 97116 GAIT TRAINING THERAPY: CPT

## 2021-02-16 PROCEDURE — 85610 PROTHROMBIN TIME: CPT

## 2021-02-16 PROCEDURE — 85025 COMPLETE CBC W/AUTO DIFF WBC: CPT

## 2021-02-16 PROCEDURE — 25000003 PHARM REV CODE 250: Performed by: INTERNAL MEDICINE

## 2021-02-16 PROCEDURE — 97161 PT EVAL LOW COMPLEX 20 MIN: CPT

## 2021-02-16 PROCEDURE — 36415 COLL VENOUS BLD VENIPUNCTURE: CPT

## 2021-02-16 PROCEDURE — 99214 OFFICE O/P EST MOD 30 MIN: CPT | Mod: ,,, | Performed by: INTERNAL MEDICINE

## 2021-02-16 PROCEDURE — G0378 HOSPITAL OBSERVATION PER HR: HCPCS

## 2021-02-16 PROCEDURE — 83735 ASSAY OF MAGNESIUM: CPT

## 2021-02-16 RX ORDER — HYDROXYUREA 500 MG/1
500 CAPSULE ORAL 2 TIMES DAILY
Qty: 60 CAPSULE | Refills: 0
Start: 2021-02-16 | End: 2021-03-18

## 2021-02-16 RX ADMIN — ALLOPURINOL 100 MG: 100 TABLET ORAL at 10:02

## 2021-02-16 RX ADMIN — LEVOTHYROXINE SODIUM 88 MCG: 88 TABLET ORAL at 06:02

## 2021-02-16 RX ADMIN — HYDROXYUREA 500 MG: 500 CAPSULE ORAL at 10:02

## 2021-02-16 RX ADMIN — MEGESTROL ACETATE 40 MG: 20 TABLET ORAL at 10:02

## 2021-02-16 RX ADMIN — CYANOCOBALAMIN TAB 1000 MCG 1000 MCG: 1000 TAB at 10:02

## 2021-02-16 RX ADMIN — METOPROLOL TARTRATE 25 MG: 25 TABLET, FILM COATED ORAL at 10:02

## 2021-02-16 RX ADMIN — BRIMONIDINE TARTRATE 1 DROP: 1.5 SOLUTION OPHTHALMIC at 10:02

## 2021-02-16 RX ADMIN — PANTOPRAZOLE SODIUM 40 MG: 40 TABLET, DELAYED RELEASE ORAL at 06:02

## 2021-02-16 RX ADMIN — DOCUSATE SODIUM 100 MG: 100 CAPSULE, LIQUID FILLED ORAL at 10:02

## 2021-02-17 ENCOUNTER — TELEPHONE (OUTPATIENT)
Dept: CARDIAC REHAB | Facility: HOSPITAL | Age: 81
End: 2021-02-17
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
## 2021-02-17 RX ORDER — TRAMADOL HYDROCHLORIDE 50 MG/1
50 TABLET ORAL EVERY 6 HOURS PRN
Qty: 30 TABLET | Refills: 0 | Status: SHIPPED | OUTPATIENT
Start: 2021-02-17 | End: 2021-03-16
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
## 2021-02-19 ENCOUNTER — OFFICE VISIT (OUTPATIENT)
Dept: HEMATOLOGY/ONCOLOGY | Facility: CLINIC | Age: 81
End: 2021-02-19
Payer: MEDICARE

## 2021-02-19 VITALS
RESPIRATION RATE: 18 BRPM | TEMPERATURE: 98 F | HEART RATE: 74 BPM | DIASTOLIC BLOOD PRESSURE: 70 MMHG | BODY MASS INDEX: 18.61 KG/M2 | SYSTOLIC BLOOD PRESSURE: 117 MMHG | WEIGHT: 126 LBS

## 2021-02-19 DIAGNOSIS — R19.00 PELVIC MASS IN MALE: Primary | ICD-10-CM

## 2021-02-19 PROCEDURE — 3288F PR FALLS RISK ASSESSMENT DOCUMENTED: ICD-10-PCS | Mod: S$GLB,,, | Performed by: INTERNAL MEDICINE

## 2021-02-19 PROCEDURE — 3074F SYST BP LT 130 MM HG: CPT | Mod: S$GLB,,, | Performed by: INTERNAL MEDICINE

## 2021-02-19 PROCEDURE — 1125F AMNT PAIN NOTED PAIN PRSNT: CPT | Mod: S$GLB,,, | Performed by: INTERNAL MEDICINE

## 2021-02-19 PROCEDURE — 1125F PR PAIN SEVERITY QUANTIFIED, PAIN PRESENT: ICD-10-PCS | Mod: S$GLB,,, | Performed by: INTERNAL MEDICINE

## 2021-02-19 PROCEDURE — 1159F PR MEDICATION LIST DOCUMENTED IN MEDICAL RECORD: ICD-10-PCS | Mod: S$GLB,,, | Performed by: INTERNAL MEDICINE

## 2021-02-19 PROCEDURE — 1100F PTFALLS ASSESS-DOCD GE2>/YR: CPT | Mod: S$GLB,,, | Performed by: INTERNAL MEDICINE

## 2021-02-19 PROCEDURE — 1100F PR PT FALLS ASSESS DOC 2+ FALLS/FALL W/INJURY/YR: ICD-10-PCS | Mod: S$GLB,,, | Performed by: INTERNAL MEDICINE

## 2021-02-19 PROCEDURE — 1159F MED LIST DOCD IN RCRD: CPT | Mod: S$GLB,,, | Performed by: INTERNAL MEDICINE

## 2021-02-19 PROCEDURE — 3288F FALL RISK ASSESSMENT DOCD: CPT | Mod: S$GLB,,, | Performed by: INTERNAL MEDICINE

## 2021-02-19 PROCEDURE — 3078F DIAST BP <80 MM HG: CPT | Mod: S$GLB,,, | Performed by: INTERNAL MEDICINE

## 2021-02-19 PROCEDURE — 3078F PR MOST RECENT DIASTOLIC BLOOD PRESSURE < 80 MM HG: ICD-10-PCS | Mod: S$GLB,,, | Performed by: INTERNAL MEDICINE

## 2021-02-19 PROCEDURE — 99214 PR OFFICE/OUTPT VISIT, EST, LEVL IV, 30-39 MIN: ICD-10-PCS | Mod: S$GLB,,, | Performed by: INTERNAL MEDICINE

## 2021-02-19 PROCEDURE — 99214 OFFICE O/P EST MOD 30 MIN: CPT | Mod: S$GLB,,, | Performed by: INTERNAL MEDICINE

## 2021-02-19 PROCEDURE — 3074F PR MOST RECENT SYSTOLIC BLOOD PRESSURE < 130 MM HG: ICD-10-PCS | Mod: S$GLB,,, | Performed by: INTERNAL MEDICINE

## 2021-02-20 ENCOUNTER — HOSPITAL ENCOUNTER (OUTPATIENT)
Dept: RADIOLOGY | Facility: HOSPITAL | Age: 81
Discharge: HOME | End: 2021-02-20
Attending: INTERNAL MEDICINE
Payer: MEDICARE

## 2021-02-20 DIAGNOSIS — R19.00 PELVIC MASS IN MALE: ICD-10-CM

## 2021-02-20 PROCEDURE — 25500020 PHARM REV CODE 255: Performed by: INTERNAL MEDICINE

## 2021-02-20 PROCEDURE — 72194 CT PELVIS W/O & W/DYE: CPT | Mod: TC

## 2021-02-20 RX ADMIN — IOHEXOL 100 ML: 350 INJECTION, SOLUTION INTRAVENOUS at 09:02

## 2021-02-24 ENCOUNTER — TELEPHONE (OUTPATIENT)
Dept: FAMILY MEDICINE | Facility: CLINIC | Age: 81
End: 2021-02-24

## 2021-02-24 DIAGNOSIS — K59.1 FUNCTIONAL DIARRHEA: Primary | ICD-10-CM

## 2021-02-24 RX ORDER — DIPHENOXYLATE HYDROCHLORIDE AND ATROPINE SULFATE 2.5; .025 MG/1; MG/1
1 TABLET ORAL 4 TIMES DAILY PRN
Qty: 40 TABLET | Refills: 1 | Status: SHIPPED | OUTPATIENT
Start: 2021-02-24 | End: 2021-03-06

## 2021-02-25 ENCOUNTER — TELEPHONE (OUTPATIENT)
Dept: HEMATOLOGY/ONCOLOGY | Facility: CLINIC | Age: 81
End: 2021-02-25

## 2021-02-26 LAB
BASOPHILS # BLD AUTO: 40 CELLS/UL (ref 0–200)
BASOPHILS NFR BLD AUTO: 0.5 %
EOSINOPHIL # BLD AUTO: 104 CELLS/UL (ref 15–500)
EOSINOPHIL NFR BLD AUTO: 1.3 %
ERYTHROCYTE [DISTWIDTH] IN BLOOD BY AUTOMATED COUNT: 24.9 % (ref 11–15)
FERRITIN SERPL-MCNC: 39 NG/ML (ref 24–380)
HCT VFR BLD AUTO: 32.3 % (ref 38.5–50)
HGB BLD-MCNC: 9.5 G/DL (ref 13.2–17.1)
LYMPHOCYTES # BLD AUTO: 744 CELLS/UL (ref 850–3900)
LYMPHOCYTES NFR BLD AUTO: 9.3 %
MCH RBC QN AUTO: 21.9 PG (ref 27–33)
MCHC RBC AUTO-ENTMCNC: 29.4 G/DL (ref 32–36)
MCV RBC AUTO: 74.6 FL (ref 80–100)
MONOCYTES # BLD AUTO: 120 CELLS/UL (ref 200–950)
MONOCYTES NFR BLD AUTO: 1.5 %
MORPHOLOGY BLD-IMP: NORMAL
NEUTROPHILS # BLD AUTO: 6992 CELLS/UL (ref 1500–7800)
NEUTROPHILS NFR BLD AUTO: 87.4 %
PLATELET # BLD AUTO: 244 THOUSAND/UL (ref 140–400)
PMV BLD REES-ECKER: 11 FL (ref 7.5–12.5)
RBC # BLD AUTO: 4.33 MILLION/UL (ref 4.2–5.8)
WBC # BLD AUTO: 8 THOUSAND/UL (ref 3.8–10.8)

## 2021-03-01 DIAGNOSIS — R63.4 WEIGHT LOSS: ICD-10-CM

## 2021-03-01 RX ORDER — MEGESTROL ACETATE 40 MG/1
TABLET ORAL
Qty: 120 TABLET | Refills: 1 | Status: SHIPPED | OUTPATIENT
Start: 2021-03-01 | End: 2021-03-16 | Stop reason: SDUPTHER

## 2021-03-05 ENCOUNTER — OFFICE VISIT (OUTPATIENT)
Dept: HEMATOLOGY/ONCOLOGY | Facility: CLINIC | Age: 81
End: 2021-03-05
Payer: MEDICARE

## 2021-03-05 VITALS
DIASTOLIC BLOOD PRESSURE: 73 MMHG | HEART RATE: 76 BPM | HEIGHT: 69 IN | SYSTOLIC BLOOD PRESSURE: 117 MMHG | RESPIRATION RATE: 16 BRPM | BODY MASS INDEX: 18.61 KG/M2

## 2021-03-05 DIAGNOSIS — D75.1 POLYCYTHEMIA, SECONDARY: Primary | ICD-10-CM

## 2021-03-05 DIAGNOSIS — D50.0 IRON DEFICIENCY ANEMIA DUE TO CHRONIC BLOOD LOSS: ICD-10-CM

## 2021-03-05 DIAGNOSIS — I48.0 PAROXYSMAL ATRIAL FIBRILLATION: ICD-10-CM

## 2021-03-05 DIAGNOSIS — D62 ACUTE BLOOD LOSS ANEMIA: ICD-10-CM

## 2021-03-05 PROCEDURE — 1126F PR PAIN SEVERITY QUANTIFIED, NO PAIN PRESENT: ICD-10-PCS | Mod: S$GLB,,, | Performed by: INTERNAL MEDICINE

## 2021-03-05 PROCEDURE — 3074F SYST BP LT 130 MM HG: CPT | Mod: S$GLB,,, | Performed by: INTERNAL MEDICINE

## 2021-03-05 PROCEDURE — 1159F PR MEDICATION LIST DOCUMENTED IN MEDICAL RECORD: ICD-10-PCS | Mod: S$GLB,,, | Performed by: INTERNAL MEDICINE

## 2021-03-05 PROCEDURE — 99213 OFFICE O/P EST LOW 20 MIN: CPT | Mod: S$GLB,,, | Performed by: INTERNAL MEDICINE

## 2021-03-05 PROCEDURE — 3074F PR MOST RECENT SYSTOLIC BLOOD PRESSURE < 130 MM HG: ICD-10-PCS | Mod: S$GLB,,, | Performed by: INTERNAL MEDICINE

## 2021-03-05 PROCEDURE — 1101F PR PT FALLS ASSESS DOC 0-1 FALLS W/OUT INJ PAST YR: ICD-10-PCS | Mod: S$GLB,,, | Performed by: INTERNAL MEDICINE

## 2021-03-05 PROCEDURE — 1126F AMNT PAIN NOTED NONE PRSNT: CPT | Mod: S$GLB,,, | Performed by: INTERNAL MEDICINE

## 2021-03-05 PROCEDURE — 1101F PT FALLS ASSESS-DOCD LE1/YR: CPT | Mod: S$GLB,,, | Performed by: INTERNAL MEDICINE

## 2021-03-05 PROCEDURE — 3078F PR MOST RECENT DIASTOLIC BLOOD PRESSURE < 80 MM HG: ICD-10-PCS | Mod: S$GLB,,, | Performed by: INTERNAL MEDICINE

## 2021-03-05 PROCEDURE — 3078F DIAST BP <80 MM HG: CPT | Mod: S$GLB,,, | Performed by: INTERNAL MEDICINE

## 2021-03-05 PROCEDURE — 3288F PR FALLS RISK ASSESSMENT DOCUMENTED: ICD-10-PCS | Mod: S$GLB,,, | Performed by: INTERNAL MEDICINE

## 2021-03-05 PROCEDURE — 3288F FALL RISK ASSESSMENT DOCD: CPT | Mod: S$GLB,,, | Performed by: INTERNAL MEDICINE

## 2021-03-05 PROCEDURE — 1159F MED LIST DOCD IN RCRD: CPT | Mod: S$GLB,,, | Performed by: INTERNAL MEDICINE

## 2021-03-05 PROCEDURE — 99213 PR OFFICE/OUTPT VISIT, EST, LEVL III, 20-29 MIN: ICD-10-PCS | Mod: S$GLB,,, | Performed by: INTERNAL MEDICINE

## 2021-03-06 ENCOUNTER — TELEPHONE (OUTPATIENT)
Dept: HEMATOLOGY/ONCOLOGY | Facility: CLINIC | Age: 81
End: 2021-03-06

## 2021-03-06 DIAGNOSIS — D50.0 IRON DEFICIENCY ANEMIA DUE TO CHRONIC BLOOD LOSS: ICD-10-CM

## 2021-03-06 LAB
BASOPHILS # BLD AUTO: 99 CELLS/UL (ref 0–200)
BASOPHILS NFR BLD AUTO: 1.8 %
EOSINOPHIL # BLD AUTO: 138 CELLS/UL (ref 15–500)
EOSINOPHIL NFR BLD AUTO: 2.5 %
ERYTHROCYTE [DISTWIDTH] IN BLOOD BY AUTOMATED COUNT: 29.2 % (ref 11–15)
FERRITIN SERPL-MCNC: 31 NG/ML (ref 24–380)
HCT VFR BLD AUTO: 38.1 % (ref 38.5–50)
HGB BLD-MCNC: 11.4 G/DL (ref 13.2–17.1)
LYMPHOCYTES # BLD AUTO: 748 CELLS/UL (ref 850–3900)
LYMPHOCYTES NFR BLD AUTO: 13.6 %
MCH RBC QN AUTO: 23.8 PG (ref 27–33)
MCHC RBC AUTO-ENTMCNC: 29.9 G/DL (ref 32–36)
MCV RBC AUTO: 79.4 FL (ref 80–100)
MONOCYTES # BLD AUTO: 292 CELLS/UL (ref 200–950)
MONOCYTES NFR BLD AUTO: 5.3 %
NEUTROPHILS # BLD AUTO: 4224 CELLS/UL (ref 1500–7800)
NEUTROPHILS NFR BLD AUTO: 76.8 %
PLATELET # BLD AUTO: 359 THOUSAND/UL (ref 140–400)
PMV BLD REES-ECKER: ABNORMAL FL (ref 7.5–12.5)
RBC # BLD AUTO: 4.8 MILLION/UL (ref 4.2–5.8)
SERVICE CMNT-IMP: ABNORMAL
WBC # BLD AUTO: 5.5 THOUSAND/UL (ref 3.8–10.8)

## 2021-03-10 RX ORDER — SODIUM CHLORIDE 9 MG/ML
INJECTION, SOLUTION INTRAVENOUS CONTINUOUS
Status: CANCELLED | OUTPATIENT
Start: 2021-03-12

## 2021-03-10 RX ORDER — SODIUM CHLORIDE 0.9 % (FLUSH) 0.9 %
10 SYRINGE (ML) INJECTION
Status: CANCELLED | OUTPATIENT
Start: 2021-03-12

## 2021-03-10 RX ORDER — METHYLPREDNISOLONE SOD SUCC 125 MG
125 VIAL (EA) INJECTION ONCE AS NEEDED
Status: CANCELLED | OUTPATIENT
Start: 2021-03-12

## 2021-03-10 RX ORDER — DIPHENHYDRAMINE HYDROCHLORIDE 50 MG/ML
50 INJECTION INTRAMUSCULAR; INTRAVENOUS ONCE AS NEEDED
Status: CANCELLED | OUTPATIENT
Start: 2021-03-12

## 2021-03-10 RX ORDER — HEPARIN 100 UNIT/ML
5 SYRINGE INTRAVENOUS
Status: CANCELLED | OUTPATIENT
Start: 2021-03-12

## 2021-03-10 RX ORDER — EPINEPHRINE 0.3 MG/.3ML
0.3 INJECTION SUBCUTANEOUS ONCE AS NEEDED
Status: CANCELLED | OUTPATIENT
Start: 2021-03-12

## 2021-03-11 ENCOUNTER — INFUSION (OUTPATIENT)
Dept: INFUSION THERAPY | Facility: HOSPITAL | Age: 81
End: 2021-03-11
Attending: INTERNAL MEDICINE
Payer: MEDICARE

## 2021-03-11 VITALS
RESPIRATION RATE: 17 BRPM | HEART RATE: 78 BPM | WEIGHT: 127.38 LBS | HEIGHT: 69 IN | DIASTOLIC BLOOD PRESSURE: 63 MMHG | SYSTOLIC BLOOD PRESSURE: 116 MMHG | BODY MASS INDEX: 18.87 KG/M2

## 2021-03-11 DIAGNOSIS — D50.0 IRON DEFICIENCY ANEMIA DUE TO CHRONIC BLOOD LOSS: Primary | ICD-10-CM

## 2021-03-11 PROCEDURE — 25000003 PHARM REV CODE 250: Performed by: INTERNAL MEDICINE

## 2021-03-11 PROCEDURE — 63600175 PHARM REV CODE 636 W HCPCS: Mod: JG | Performed by: INTERNAL MEDICINE

## 2021-03-11 PROCEDURE — 96365 THER/PROPH/DIAG IV INF INIT: CPT

## 2021-03-11 RX ORDER — HEPARIN 100 UNIT/ML
5 SYRINGE INTRAVENOUS
Status: CANCELLED | OUTPATIENT
Start: 2021-03-18

## 2021-03-11 RX ORDER — SODIUM CHLORIDE 9 MG/ML
INJECTION, SOLUTION INTRAVENOUS CONTINUOUS
Status: CANCELLED | OUTPATIENT
Start: 2021-03-18

## 2021-03-11 RX ORDER — METHYLPREDNISOLONE SOD SUCC 125 MG
125 VIAL (EA) INJECTION ONCE AS NEEDED
Status: CANCELLED | OUTPATIENT
Start: 2021-03-18

## 2021-03-11 RX ORDER — SODIUM CHLORIDE 0.9 % (FLUSH) 0.9 %
10 SYRINGE (ML) INJECTION
Status: CANCELLED | OUTPATIENT
Start: 2021-03-18

## 2021-03-11 RX ORDER — DIPHENHYDRAMINE HYDROCHLORIDE 50 MG/ML
50 INJECTION INTRAMUSCULAR; INTRAVENOUS ONCE AS NEEDED
Status: CANCELLED | OUTPATIENT
Start: 2021-03-18

## 2021-03-11 RX ORDER — SODIUM CHLORIDE 9 MG/ML
INJECTION, SOLUTION INTRAVENOUS CONTINUOUS
Status: DISCONTINUED | OUTPATIENT
Start: 2021-03-11 | End: 2021-03-11 | Stop reason: HOSPADM

## 2021-03-11 RX ORDER — EPINEPHRINE 0.3 MG/.3ML
0.3 INJECTION SUBCUTANEOUS ONCE AS NEEDED
Status: CANCELLED | OUTPATIENT
Start: 2021-03-18

## 2021-03-11 RX ORDER — SODIUM CHLORIDE 0.9 % (FLUSH) 0.9 %
10 SYRINGE (ML) INJECTION
Status: DISCONTINUED | OUTPATIENT
Start: 2021-03-11 | End: 2021-03-11 | Stop reason: HOSPADM

## 2021-03-11 RX ADMIN — SODIUM CHLORIDE: 0.9 INJECTION, SOLUTION INTRAVENOUS at 01:03

## 2021-03-11 RX ADMIN — FERRIC CARBOXYMALTOSE INJECTION 750 MG: 50 INJECTION, SOLUTION INTRAVENOUS at 01:03

## 2021-03-15 ENCOUNTER — TELEPHONE (OUTPATIENT)
Dept: FAMILY MEDICINE | Facility: CLINIC | Age: 81
End: 2021-03-15

## 2021-03-15 DIAGNOSIS — J18.9 PNEUMONIA DUE TO INFECTIOUS ORGANISM, UNSPECIFIED LATERALITY, UNSPECIFIED PART OF LUNG: Primary | ICD-10-CM

## 2021-03-15 RX ORDER — METOPROLOL TARTRATE 25 MG/1
25 TABLET, FILM COATED ORAL 2 TIMES DAILY
Qty: 60 TABLET | Refills: 1 | Status: SHIPPED | OUTPATIENT
Start: 2021-03-15 | End: 2021-03-16 | Stop reason: SDUPTHER

## 2021-03-16 ENCOUNTER — TELEPHONE (OUTPATIENT)
Dept: FAMILY MEDICINE | Facility: CLINIC | Age: 81
End: 2021-03-16

## 2021-03-16 ENCOUNTER — OFFICE VISIT (OUTPATIENT)
Dept: FAMILY MEDICINE | Facility: CLINIC | Age: 81
End: 2021-03-16
Payer: MEDICARE

## 2021-03-16 VITALS
DIASTOLIC BLOOD PRESSURE: 50 MMHG | HEART RATE: 68 BPM | BODY MASS INDEX: 18.66 KG/M2 | SYSTOLIC BLOOD PRESSURE: 92 MMHG | WEIGHT: 126 LBS | HEIGHT: 69 IN

## 2021-03-16 DIAGNOSIS — H40.1131 PRIMARY OPEN ANGLE GLAUCOMA OF BOTH EYES, MILD STAGE: ICD-10-CM

## 2021-03-16 DIAGNOSIS — E03.9 ACQUIRED HYPOTHYROIDISM: ICD-10-CM

## 2021-03-16 DIAGNOSIS — I65.23 BILATERAL CAROTID ARTERY STENOSIS: ICD-10-CM

## 2021-03-16 DIAGNOSIS — I48.0 PAROXYSMAL A-FIB: ICD-10-CM

## 2021-03-16 DIAGNOSIS — I10 ESSENTIAL HYPERTENSION: Chronic | ICD-10-CM

## 2021-03-16 DIAGNOSIS — D75.839 THROMBOCYTOSIS: ICD-10-CM

## 2021-03-16 DIAGNOSIS — R63.4 WEIGHT LOSS: ICD-10-CM

## 2021-03-16 DIAGNOSIS — R53.1 GENERALIZED WEAKNESS: ICD-10-CM

## 2021-03-16 DIAGNOSIS — K21.9 GASTRO-ESOPHAGEAL REFLUX DISEASE WITHOUT ESOPHAGITIS: ICD-10-CM

## 2021-03-16 DIAGNOSIS — M1A.0790 IDIOPATHIC CHRONIC GOUT OF FOOT WITHOUT TOPHUS, UNSPECIFIED LATERALITY: ICD-10-CM

## 2021-03-16 DIAGNOSIS — G31.84 MCI (MILD COGNITIVE IMPAIRMENT): ICD-10-CM

## 2021-03-16 DIAGNOSIS — D72.829 LEUKOCYTOSIS, UNSPECIFIED TYPE: ICD-10-CM

## 2021-03-16 DIAGNOSIS — Z95.1 S/P CABG (CORONARY ARTERY BYPASS GRAFT): Chronic | ICD-10-CM

## 2021-03-16 DIAGNOSIS — E78.2 MIXED HYPERLIPIDEMIA: ICD-10-CM

## 2021-03-16 DIAGNOSIS — D75.1 POLYCYTHEMIA, SECONDARY: ICD-10-CM

## 2021-03-16 DIAGNOSIS — I95.2 HYPOTENSION DUE TO DRUGS: Primary | ICD-10-CM

## 2021-03-16 DIAGNOSIS — Z87.891 FORMER SMOKER: ICD-10-CM

## 2021-03-16 DIAGNOSIS — D64.9 ANEMIA, UNSPECIFIED TYPE: ICD-10-CM

## 2021-03-16 DIAGNOSIS — M19.90 OSTEOARTHRITIS, UNSPECIFIED OSTEOARTHRITIS TYPE, UNSPECIFIED SITE: ICD-10-CM

## 2021-03-16 DIAGNOSIS — G47.33 OSA ON CPAP: Chronic | ICD-10-CM

## 2021-03-16 PROCEDURE — 1159F PR MEDICATION LIST DOCUMENTED IN MEDICAL RECORD: ICD-10-PCS | Mod: S$GLB,,, | Performed by: FAMILY MEDICINE

## 2021-03-16 PROCEDURE — 3078F DIAST BP <80 MM HG: CPT | Mod: S$GLB,,, | Performed by: FAMILY MEDICINE

## 2021-03-16 PROCEDURE — 3078F PR MOST RECENT DIASTOLIC BLOOD PRESSURE < 80 MM HG: ICD-10-PCS | Mod: S$GLB,,, | Performed by: FAMILY MEDICINE

## 2021-03-16 PROCEDURE — 99214 OFFICE O/P EST MOD 30 MIN: CPT | Mod: S$GLB,,, | Performed by: FAMILY MEDICINE

## 2021-03-16 PROCEDURE — 3074F SYST BP LT 130 MM HG: CPT | Mod: S$GLB,,, | Performed by: FAMILY MEDICINE

## 2021-03-16 PROCEDURE — 3074F PR MOST RECENT SYSTOLIC BLOOD PRESSURE < 130 MM HG: ICD-10-PCS | Mod: S$GLB,,, | Performed by: FAMILY MEDICINE

## 2021-03-16 PROCEDURE — 99214 PR OFFICE/OUTPT VISIT, EST, LEVL IV, 30-39 MIN: ICD-10-PCS | Mod: S$GLB,,, | Performed by: FAMILY MEDICINE

## 2021-03-16 PROCEDURE — 1159F MED LIST DOCD IN RCRD: CPT | Mod: S$GLB,,, | Performed by: FAMILY MEDICINE

## 2021-03-16 RX ORDER — FUROSEMIDE 20 MG/1
20 TABLET ORAL DAILY
Qty: 30 TABLET | Refills: 5 | Status: SHIPPED | OUTPATIENT
Start: 2021-03-16 | End: 2021-07-27 | Stop reason: SDUPTHER

## 2021-03-16 RX ORDER — ACYCLOVIR 800 MG/1
TABLET ORAL
COMMUNITY
Start: 2021-02-13 | End: 2022-07-25

## 2021-03-16 RX ORDER — OMEPRAZOLE 20 MG/1
20 CAPSULE, DELAYED RELEASE ORAL DAILY
Qty: 90 CAPSULE | Refills: 1 | Status: SHIPPED | OUTPATIENT
Start: 2021-03-16 | End: 2021-08-09 | Stop reason: SDUPTHER

## 2021-03-16 RX ORDER — LEVOTHYROXINE SODIUM 88 UG/1
88 TABLET ORAL
Qty: 90 TABLET | Refills: 1 | Status: SHIPPED | OUTPATIENT
Start: 2021-03-16 | End: 2021-09-14 | Stop reason: SDUPTHER

## 2021-03-16 RX ORDER — ROSUVASTATIN CALCIUM 10 MG/1
10 TABLET, COATED ORAL DAILY
Qty: 90 TABLET | Refills: 3 | Status: SHIPPED | OUTPATIENT
Start: 2021-03-16 | End: 2021-08-09 | Stop reason: SDUPTHER

## 2021-03-16 RX ORDER — DIPHENOXYLATE HYDROCHLORIDE AND ATROPINE SULFATE 2.5; .025 MG/1; MG/1
1 TABLET ORAL 4 TIMES DAILY PRN
COMMUNITY

## 2021-03-16 RX ORDER — DONEPEZIL HYDROCHLORIDE 10 MG/1
10 TABLET, FILM COATED ORAL NIGHTLY
Qty: 90 TABLET | Refills: 1 | Status: SHIPPED | OUTPATIENT
Start: 2021-03-16 | End: 2021-05-31 | Stop reason: SDUPTHER

## 2021-03-16 RX ORDER — MEGESTROL ACETATE 40 MG/1
TABLET ORAL
Qty: 120 TABLET | Refills: 1 | Status: SHIPPED | OUTPATIENT
Start: 2021-03-16 | End: 2021-09-14 | Stop reason: SDUPTHER

## 2021-03-16 RX ORDER — METOPROLOL TARTRATE 25 MG/1
25 TABLET, FILM COATED ORAL DAILY
Qty: 30 TABLET | Refills: 5 | Status: SHIPPED | OUTPATIENT
Start: 2021-03-16 | End: 2021-07-16 | Stop reason: SDUPTHER

## 2021-03-16 RX ORDER — CLOTRIMAZOLE 10 MG/1
LOZENGE ORAL; TOPICAL
COMMUNITY
Start: 2021-02-13 | End: 2021-08-26

## 2021-03-16 RX ORDER — ALLOPURINOL 100 MG/1
100 TABLET ORAL DAILY
Qty: 90 TABLET | Refills: 1 | Status: SHIPPED | OUTPATIENT
Start: 2021-03-16 | End: 2021-04-23 | Stop reason: SDUPTHER

## 2021-03-18 ENCOUNTER — TELEPHONE (OUTPATIENT)
Dept: HEMATOLOGY/ONCOLOGY | Facility: CLINIC | Age: 81
End: 2021-03-18

## 2021-03-19 ENCOUNTER — TELEPHONE (OUTPATIENT)
Dept: HEMATOLOGY/ONCOLOGY | Facility: CLINIC | Age: 81
End: 2021-03-19

## 2021-03-29 RX ORDER — TRAMADOL HYDROCHLORIDE 50 MG/1
50 TABLET ORAL EVERY 6 HOURS PRN
Qty: 30 TABLET | Refills: 0 | Status: SHIPPED | OUTPATIENT
Start: 2021-03-29 | End: 2022-07-25

## 2021-03-30 ENCOUNTER — TELEPHONE (OUTPATIENT)
Dept: FAMILY MEDICINE | Facility: CLINIC | Age: 81
End: 2021-03-30

## 2021-03-31 ENCOUNTER — TELEPHONE (OUTPATIENT)
Dept: FAMILY MEDICINE | Facility: CLINIC | Age: 81
End: 2021-03-31

## 2021-03-31 DIAGNOSIS — D64.9 ANEMIA, UNSPECIFIED TYPE: ICD-10-CM

## 2021-03-31 DIAGNOSIS — E78.2 MIXED HYPERLIPIDEMIA: Primary | ICD-10-CM

## 2021-03-31 LAB
BUN SERPL-MCNC: 25 MG/DL (ref 7–25)
BUN/CREAT SERPL: NORMAL (CALC) (ref 6–22)
CALCIUM SERPL-MCNC: 9.7 MG/DL (ref 8.6–10.3)
CHLORIDE SERPL-SCNC: 104 MMOL/L (ref 98–110)
CO2 SERPL-SCNC: 26 MMOL/L (ref 20–32)
CREAT SERPL-MCNC: 0.81 MG/DL (ref 0.7–1.18)
FERRITIN SERPL-MCNC: 148 NG/ML (ref 24–380)
GFRSERPLBLD MDRD-ARVRAT: 85 ML/MIN/1.73M2
GLUCOSE SERPL-MCNC: 99 MG/DL (ref 65–139)
POTASSIUM SERPL-SCNC: 4.9 MMOL/L (ref 3.5–5.3)
SODIUM SERPL-SCNC: 137 MMOL/L (ref 135–146)
WBC # BLD AUTO: NORMAL THOUSAND/UL

## 2021-04-06 ENCOUNTER — TELEPHONE (OUTPATIENT)
Dept: FAMILY MEDICINE | Facility: CLINIC | Age: 81
End: 2021-04-06

## 2021-04-07 ENCOUNTER — TELEPHONE (OUTPATIENT)
Dept: FAMILY MEDICINE | Facility: CLINIC | Age: 81
End: 2021-04-07

## 2021-04-07 LAB
BASOPHILS # BLD AUTO: 236 CELLS/UL (ref 0–200)
BASOPHILS NFR BLD AUTO: 1.8 %
EOSINOPHIL # BLD AUTO: 236 CELLS/UL (ref 15–500)
EOSINOPHIL NFR BLD AUTO: 1.8 %
ERYTHROCYTE [DISTWIDTH] IN BLOOD BY AUTOMATED COUNT: ABNORMAL % (ref 11–15)
HCT VFR BLD AUTO: 46.6 % (ref 38.5–50)
HGB BLD-MCNC: 14.9 G/DL (ref 13.2–17.1)
LYMPHOCYTES # BLD AUTO: 1441 CELLS/UL (ref 850–3900)
LYMPHOCYTES NFR BLD AUTO: 11 %
MCH RBC QN AUTO: 29.1 PG (ref 27–33)
MCHC RBC AUTO-ENTMCNC: 32 G/DL (ref 32–36)
MCV RBC AUTO: 91 FL (ref 80–100)
MONOCYTES # BLD AUTO: 721 CELLS/UL (ref 200–950)
MONOCYTES NFR BLD AUTO: 5.5 %
NEUTROPHILS # BLD AUTO: ABNORMAL CELLS/UL (ref 1500–7800)
NEUTROPHILS NFR BLD AUTO: 79.9 %
PLATELET # BLD AUTO: 515 THOUSAND/UL (ref 140–400)
PMV BLD REES-ECKER: 10.2 FL (ref 7.5–12.5)
RBC # BLD AUTO: 5.12 MILLION/UL (ref 4.2–5.8)
WBC # BLD AUTO: 13.1 THOUSAND/UL (ref 3.8–10.8)

## 2021-04-16 ENCOUNTER — TELEPHONE (OUTPATIENT)
Dept: HEMATOLOGY/ONCOLOGY | Facility: CLINIC | Age: 81
End: 2021-04-16

## 2021-04-20 ENCOUNTER — HOSPITAL ENCOUNTER (EMERGENCY)
Facility: HOSPITAL | Age: 81
Discharge: HOME | End: 2021-04-20
Attending: EMERGENCY MEDICINE
Payer: MEDICARE

## 2021-04-20 VITALS
HEIGHT: 69 IN | BODY MASS INDEX: 20.73 KG/M2 | WEIGHT: 140 LBS | OXYGEN SATURATION: 100 % | RESPIRATION RATE: 18 BRPM | HEART RATE: 85 BPM | TEMPERATURE: 98 F | DIASTOLIC BLOOD PRESSURE: 97 MMHG | SYSTOLIC BLOOD PRESSURE: 166 MMHG

## 2021-04-20 DIAGNOSIS — S51.011A SKIN TEAR OF RIGHT ELBOW WITHOUT COMPLICATION, INITIAL ENCOUNTER: ICD-10-CM

## 2021-04-20 DIAGNOSIS — S00.83XA CONTUSION OF FOREHEAD, INITIAL ENCOUNTER: Primary | ICD-10-CM
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
## 2021-04-20 PROCEDURE — 25000003 PHARM REV CODE 250: Performed by: EMERGENCY MEDICINE

## 2021-04-20 PROCEDURE — 99284 EMERGENCY DEPT VISIT MOD MDM: CPT | Mod: 25

## 2021-04-20 RX ORDER — MUPIROCIN 20 MG/G
1 OINTMENT TOPICAL
Status: COMPLETED | OUTPATIENT
Start: 2021-04-20 | End: 2021-04-20

## 2021-04-20 RX ADMIN — MUPIROCIN 22 G: 20 OINTMENT TOPICAL at 04:04

## 2021-04-21 ENCOUNTER — TELEPHONE (OUTPATIENT)
Dept: HEMATOLOGY/ONCOLOGY | Facility: CLINIC | Age: 81
End: 2021-04-21

## 2021-04-21 DIAGNOSIS — D75.839 THROMBOCYTOSIS: Primary | ICD-10-CM

## 2021-04-23 ENCOUNTER — OFFICE VISIT (OUTPATIENT)
Dept: FAMILY MEDICINE | Facility: CLINIC | Age: 81
End: 2021-04-23
Payer: MEDICARE

## 2021-04-23 VITALS
HEART RATE: 68 BPM | HEIGHT: 69 IN | SYSTOLIC BLOOD PRESSURE: 102 MMHG | DIASTOLIC BLOOD PRESSURE: 64 MMHG | WEIGHT: 124.63 LBS | BODY MASS INDEX: 18.46 KG/M2

## 2021-04-23 DIAGNOSIS — I10 ESSENTIAL HYPERTENSION: Chronic | ICD-10-CM

## 2021-04-23 DIAGNOSIS — E43 SEVERE MALNUTRITION: Chronic | ICD-10-CM

## 2021-04-23 DIAGNOSIS — Z95.1 S/P CABG (CORONARY ARTERY BYPASS GRAFT): Chronic | ICD-10-CM

## 2021-04-23 DIAGNOSIS — F32.9 REACTIVE DEPRESSION: ICD-10-CM

## 2021-04-23 DIAGNOSIS — F32.1 CURRENT MODERATE EPISODE OF MAJOR DEPRESSIVE DISORDER WITHOUT PRIOR EPISODE: ICD-10-CM
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
## 2021-04-23 DIAGNOSIS — S00.83XD TRAUMATIC HEMATOMA OF FOREHEAD, SUBSEQUENT ENCOUNTER: ICD-10-CM

## 2021-04-23 DIAGNOSIS — E03.9 ACQUIRED HYPOTHYROIDISM: Chronic | ICD-10-CM

## 2021-04-23 DIAGNOSIS — D45 POLYCYTHEMIA VERA: Chronic | ICD-10-CM

## 2021-04-23 DIAGNOSIS — R53.1 GENERALIZED WEAKNESS: ICD-10-CM

## 2021-04-23 DIAGNOSIS — E78.2 MIXED HYPERLIPIDEMIA: Chronic | ICD-10-CM

## 2021-04-23 DIAGNOSIS — M1A.0790 IDIOPATHIC CHRONIC GOUT OF FOOT WITHOUT TOPHUS, UNSPECIFIED LATERALITY: ICD-10-CM

## 2021-04-23 DIAGNOSIS — M10.9 GOUT, UNSPECIFIED CAUSE, UNSPECIFIED CHRONICITY, UNSPECIFIED SITE: ICD-10-CM

## 2021-04-23 DIAGNOSIS — I27.20 PULMONARY HYPERTENSION: Chronic | ICD-10-CM

## 2021-04-23 DIAGNOSIS — R63.4 WEIGHT LOSS: ICD-10-CM

## 2021-04-23 DIAGNOSIS — G31.84 MCI (MILD COGNITIVE IMPAIRMENT): ICD-10-CM

## 2021-04-23 DIAGNOSIS — J84.10 PULMONARY FIBROSIS, UNSPECIFIED: ICD-10-CM

## 2021-04-23 DIAGNOSIS — R54 FRAILTY: ICD-10-CM

## 2021-04-23 PROCEDURE — 3288F FALL RISK ASSESSMENT DOCD: CPT | Mod: S$GLB,,, | Performed by: FAMILY MEDICINE

## 2021-04-23 PROCEDURE — 1100F PTFALLS ASSESS-DOCD GE2>/YR: CPT | Mod: S$GLB,,, | Performed by: FAMILY MEDICINE

## 2021-04-23 PROCEDURE — 3074F PR MOST RECENT SYSTOLIC BLOOD PRESSURE < 130 MM HG: ICD-10-PCS | Mod: S$GLB,,, | Performed by: FAMILY MEDICINE

## 2021-04-23 PROCEDURE — 3074F SYST BP LT 130 MM HG: CPT | Mod: S$GLB,,, | Performed by: FAMILY MEDICINE

## 2021-04-23 PROCEDURE — 1159F PR MEDICATION LIST DOCUMENTED IN MEDICAL RECORD: ICD-10-PCS | Mod: S$GLB,,, | Performed by: FAMILY MEDICINE

## 2021-04-23 PROCEDURE — 1159F MED LIST DOCD IN RCRD: CPT | Mod: S$GLB,,, | Performed by: FAMILY MEDICINE

## 2021-04-23 PROCEDURE — 3288F PR FALLS RISK ASSESSMENT DOCUMENTED: ICD-10-PCS | Mod: S$GLB,,, | Performed by: FAMILY MEDICINE

## 2021-04-23 PROCEDURE — 99214 PR OFFICE/OUTPT VISIT, EST, LEVL IV, 30-39 MIN: ICD-10-PCS | Mod: S$GLB,,, | Performed by: FAMILY MEDICINE

## 2021-04-23 PROCEDURE — 1100F PR PT FALLS ASSESS DOC 2+ FALLS/FALL W/INJURY/YR: ICD-10-PCS | Mod: S$GLB,,, | Performed by: FAMILY MEDICINE

## 2021-04-23 PROCEDURE — 3078F PR MOST RECENT DIASTOLIC BLOOD PRESSURE < 80 MM HG: ICD-10-PCS | Mod: S$GLB,,, | Performed by: FAMILY MEDICINE

## 2021-04-23 PROCEDURE — 99214 OFFICE O/P EST MOD 30 MIN: CPT | Mod: S$GLB,,, | Performed by: FAMILY MEDICINE

## 2021-04-23 PROCEDURE — 3078F DIAST BP <80 MM HG: CPT | Mod: S$GLB,,, | Performed by: FAMILY MEDICINE

## 2021-04-23 RX ORDER — HYDROXYUREA 500 MG/1
500 CAPSULE ORAL 2 TIMES DAILY
COMMUNITY
Start: 2021-03-19 | End: 2021-07-26

## 2021-04-23 RX ORDER — ALLOPURINOL 100 MG/1
100 TABLET ORAL DAILY
Qty: 90 TABLET | Refills: 1 | Status: SHIPPED | OUTPATIENT
Start: 2021-04-23 | End: 2021-10-11 | Stop reason: SDUPTHER

## 2021-04-23 RX ORDER — ARIPIPRAZOLE 5 MG/1
5 TABLET ORAL DAILY
Qty: 30 TABLET | Refills: 3 | Status: SHIPPED | OUTPATIENT
Start: 2021-04-23 | End: 2022-08-02

## 2021-04-30 LAB
BASOPHILS # BLD AUTO: 197 CELLS/UL (ref 0–200)
BASOPHILS NFR BLD AUTO: 1.6 %
EOSINOPHIL # BLD AUTO: 234 CELLS/UL (ref 15–500)
EOSINOPHIL NFR BLD AUTO: 1.9 %
ERYTHROCYTE [DISTWIDTH] IN BLOOD BY AUTOMATED COUNT: 20.9 % (ref 11–15)
HCT VFR BLD AUTO: 50.6 % (ref 38.5–50)
HGB BLD-MCNC: 16.1 G/DL (ref 13.2–17.1)
LYMPHOCYTES # BLD AUTO: 1919 CELLS/UL (ref 850–3900)
LYMPHOCYTES NFR BLD AUTO: 15.6 %
MCH RBC QN AUTO: 29.7 PG (ref 27–33)
MCHC RBC AUTO-ENTMCNC: 31.8 G/DL (ref 32–36)
MCV RBC AUTO: 93.2 FL (ref 80–100)
MONOCYTES # BLD AUTO: 664 CELLS/UL (ref 200–950)
MONOCYTES NFR BLD AUTO: 5.4 %
NEUTROPHILS # BLD AUTO: 9287 CELLS/UL (ref 1500–7800)
NEUTROPHILS NFR BLD AUTO: 75.5 %
PLATELET # BLD AUTO: 415 THOUSAND/UL (ref 140–400)
PMV BLD REES-ECKER: 10.9 FL (ref 7.5–12.5)
RBC # BLD AUTO: 5.43 MILLION/UL (ref 4.2–5.8)
WBC # BLD AUTO: 12.3 THOUSAND/UL (ref 3.8–10.8)

## 2021-05-04 ENCOUNTER — DOCUMENTATION ONLY (OUTPATIENT)
Dept: HEMATOLOGY/ONCOLOGY | Facility: CLINIC | Age: 81
End: 2021-05-04

## 2021-05-04 ENCOUNTER — OFFICE VISIT (OUTPATIENT)
Dept: HEMATOLOGY/ONCOLOGY | Facility: CLINIC | Age: 81
End: 2021-05-04
Payer: MEDICARE

## 2021-05-04 VITALS
DIASTOLIC BLOOD PRESSURE: 73 MMHG | TEMPERATURE: 98 F | SYSTOLIC BLOOD PRESSURE: 112 MMHG | HEART RATE: 69 BPM | RESPIRATION RATE: 18 BRPM

## 2021-05-04 DIAGNOSIS — I48.0 PAROXYSMAL ATRIAL FIBRILLATION: ICD-10-CM

## 2021-05-04 DIAGNOSIS — D75.1 POLYCYTHEMIA, SECONDARY: ICD-10-CM

## 2021-05-04 DIAGNOSIS — D50.0 IRON DEFICIENCY ANEMIA DUE TO CHRONIC BLOOD LOSS: ICD-10-CM

## 2021-05-04 DIAGNOSIS — D75.839 THROMBOCYTOSIS: Primary | ICD-10-CM

## 2021-05-04 PROCEDURE — 3288F PR FALLS RISK ASSESSMENT DOCUMENTED: ICD-10-PCS | Mod: S$GLB,,, | Performed by: INTERNAL MEDICINE

## 2021-05-04 PROCEDURE — 3288F FALL RISK ASSESSMENT DOCD: CPT | Mod: S$GLB,,, | Performed by: INTERNAL MEDICINE

## 2021-05-04 PROCEDURE — 1159F MED LIST DOCD IN RCRD: CPT | Mod: S$GLB,,, | Performed by: INTERNAL MEDICINE

## 2021-05-04 PROCEDURE — 1100F PTFALLS ASSESS-DOCD GE2>/YR: CPT | Mod: S$GLB,,, | Performed by: INTERNAL MEDICINE

## 2021-05-04 PROCEDURE — 99213 OFFICE O/P EST LOW 20 MIN: CPT | Mod: S$GLB,,, | Performed by: INTERNAL MEDICINE

## 2021-05-04 PROCEDURE — 99213 PR OFFICE/OUTPT VISIT, EST, LEVL III, 20-29 MIN: ICD-10-PCS | Mod: S$GLB,,, | Performed by: INTERNAL MEDICINE

## 2021-05-04 PROCEDURE — 1159F PR MEDICATION LIST DOCUMENTED IN MEDICAL RECORD: ICD-10-PCS | Mod: S$GLB,,, | Performed by: INTERNAL MEDICINE

## 2021-05-04 PROCEDURE — 1100F PR PT FALLS ASSESS DOC 2+ FALLS/FALL W/INJURY/YR: ICD-10-PCS | Mod: S$GLB,,, | Performed by: INTERNAL MEDICINE

## 2021-05-04 PROCEDURE — 1125F PR PAIN SEVERITY QUANTIFIED, PAIN PRESENT: ICD-10-PCS | Mod: S$GLB,,, | Performed by: INTERNAL MEDICINE

## 2021-05-04 PROCEDURE — 1125F AMNT PAIN NOTED PAIN PRSNT: CPT | Mod: S$GLB,,, | Performed by: INTERNAL MEDICINE

## 2021-05-17 DIAGNOSIS — R63.4 WEIGHT LOSS: Primary | ICD-10-CM

## 2021-05-25 ENCOUNTER — TELEPHONE (OUTPATIENT)
Dept: FAMILY MEDICINE | Facility: CLINIC | Age: 81
End: 2021-05-25

## 2021-05-25 DIAGNOSIS — I10 ESSENTIAL HYPERTENSION: Primary | ICD-10-CM

## 2021-05-27 LAB
ALBUMIN SERPL-MCNC: 4.7 G/DL (ref 3.6–5.1)
ALBUMIN/GLOB SERPL: 2 (CALC) (ref 1–2.5)
ALP SERPL-CCNC: 72 U/L (ref 35–144)
ALT SERPL-CCNC: 20 U/L (ref 9–46)
AST SERPL-CCNC: 21 U/L (ref 10–35)
BASOPHILS # BLD AUTO: 156 CELLS/UL (ref 0–200)
BASOPHILS NFR BLD AUTO: 1.7 %
BILIRUB SERPL-MCNC: 0.7 MG/DL (ref 0.2–1.2)
BUN SERPL-MCNC: 29 MG/DL (ref 7–25)
BUN/CREAT SERPL: 31 (CALC) (ref 6–22)
CALCIUM SERPL-MCNC: 9.9 MG/DL (ref 8.6–10.3)
CHLORIDE SERPL-SCNC: 103 MMOL/L (ref 98–110)
CHOLEST SERPL-MCNC: 113 MG/DL
CHOLEST/HDLC SERPL: 4.2 (CALC)
CO2 SERPL-SCNC: 26 MMOL/L (ref 20–32)
CREAT SERPL-MCNC: 0.93 MG/DL (ref 0.7–1.11)
EOSINOPHIL # BLD AUTO: 202 CELLS/UL (ref 15–500)
EOSINOPHIL NFR BLD AUTO: 2.2 %
ERYTHROCYTE [DISTWIDTH] IN BLOOD BY AUTOMATED COUNT: 13.5 % (ref 11–15)
GLOBULIN SER CALC-MCNC: 2.4 G/DL (CALC) (ref 1.9–3.7)
GLUCOSE SERPL-MCNC: 71 MG/DL (ref 65–139)
HCT VFR BLD AUTO: 53.5 % (ref 38.5–50)
HDLC SERPL-MCNC: 27 MG/DL
HGB BLD-MCNC: 17.6 G/DL (ref 13.2–17.1)
LDLC SERPL CALC-MCNC: 64 MG/DL (CALC)
LYMPHOCYTES # BLD AUTO: 1325 CELLS/UL (ref 850–3900)
LYMPHOCYTES NFR BLD AUTO: 14.4 %
MCH RBC QN AUTO: 30.6 PG (ref 27–33)
MCHC RBC AUTO-ENTMCNC: 32.9 G/DL (ref 32–36)
MCV RBC AUTO: 92.9 FL (ref 80–100)
MONOCYTES # BLD AUTO: 635 CELLS/UL (ref 200–950)
MONOCYTES NFR BLD AUTO: 6.9 %
NEUTROPHILS # BLD AUTO: 6882 CELLS/UL (ref 1500–7800)
NEUTROPHILS NFR BLD AUTO: 74.8 %
NONHDLC SERPL-MCNC: 86 MG/DL (CALC)
PLATELET # BLD AUTO: 378 THOUSAND/UL (ref 140–400)
PMV BLD REES-ECKER: 11 FL (ref 7.5–12.5)
POTASSIUM SERPL-SCNC: 4.3 MMOL/L (ref 3.5–5.3)
PROT SERPL-MCNC: 7.1 G/DL (ref 6.1–8.1)
RBC # BLD AUTO: 5.76 MILLION/UL (ref 4.2–5.8)
SODIUM SERPL-SCNC: 139 MMOL/L (ref 135–146)
TRIGL SERPL-MCNC: 137 MG/DL
TSH SERPL-ACNC: 3.11 MIU/L (ref 0.4–4.5)
WBC # BLD AUTO: 9.2 THOUSAND/UL (ref 3.8–10.8)

## 2021-05-31 DIAGNOSIS — G31.84 MCI (MILD COGNITIVE IMPAIRMENT): ICD-10-CM

## 2021-05-31 RX ORDER — DONEPEZIL HYDROCHLORIDE 10 MG/1
10 TABLET, FILM COATED ORAL NIGHTLY
Qty: 90 TABLET | Refills: 1 | Status: SHIPPED | OUTPATIENT
Start: 2021-05-31 | End: 2021-11-29 | Stop reason: SDUPTHER

## 2021-06-01 ENCOUNTER — OFFICE VISIT (OUTPATIENT)
Dept: HEMATOLOGY/ONCOLOGY | Facility: CLINIC | Age: 81
End: 2021-06-01
Payer: MEDICARE

## 2021-06-01 ENCOUNTER — TELEPHONE (OUTPATIENT)
Dept: FAMILY MEDICINE | Facility: CLINIC | Age: 81
End: 2021-06-01

## 2021-06-01 VITALS
SYSTOLIC BLOOD PRESSURE: 119 MMHG | BODY MASS INDEX: 18.51 KG/M2 | HEIGHT: 69 IN | HEART RATE: 72 BPM | RESPIRATION RATE: 18 BRPM | WEIGHT: 125 LBS | DIASTOLIC BLOOD PRESSURE: 73 MMHG

## 2021-06-01 DIAGNOSIS — I48.0 PAROXYSMAL ATRIAL FIBRILLATION: ICD-10-CM

## 2021-06-01 DIAGNOSIS — G47.30 SLEEP APNEA IN ADULT: ICD-10-CM

## 2021-06-01 DIAGNOSIS — D45 POLYCYTHEMIA VERA: Primary | ICD-10-CM

## 2021-06-01 PROCEDURE — 1126F AMNT PAIN NOTED NONE PRSNT: CPT | Mod: S$GLB,,, | Performed by: INTERNAL MEDICINE

## 2021-06-01 PROCEDURE — 3288F PR FALLS RISK ASSESSMENT DOCUMENTED: ICD-10-PCS | Mod: S$GLB,,, | Performed by: INTERNAL MEDICINE

## 2021-06-01 PROCEDURE — 1159F MED LIST DOCD IN RCRD: CPT | Mod: S$GLB,,, | Performed by: INTERNAL MEDICINE

## 2021-06-01 PROCEDURE — 1100F PTFALLS ASSESS-DOCD GE2>/YR: CPT | Mod: S$GLB,,, | Performed by: INTERNAL MEDICINE

## 2021-06-01 PROCEDURE — 1100F PR PT FALLS ASSESS DOC 2+ FALLS/FALL W/INJURY/YR: ICD-10-PCS | Mod: S$GLB,,, | Performed by: INTERNAL MEDICINE

## 2021-06-01 PROCEDURE — 99214 PR OFFICE/OUTPT VISIT, EST, LEVL IV, 30-39 MIN: ICD-10-PCS | Mod: S$GLB,,, | Performed by: INTERNAL MEDICINE

## 2021-06-01 PROCEDURE — 1159F PR MEDICATION LIST DOCUMENTED IN MEDICAL RECORD: ICD-10-PCS | Mod: S$GLB,,, | Performed by: INTERNAL MEDICINE

## 2021-06-01 PROCEDURE — 99214 OFFICE O/P EST MOD 30 MIN: CPT | Mod: S$GLB,,, | Performed by: INTERNAL MEDICINE

## 2021-06-01 PROCEDURE — 1126F PR PAIN SEVERITY QUANTIFIED, NO PAIN PRESENT: ICD-10-PCS | Mod: S$GLB,,, | Performed by: INTERNAL MEDICINE

## 2021-06-01 PROCEDURE — 3288F FALL RISK ASSESSMENT DOCD: CPT | Mod: S$GLB,,, | Performed by: INTERNAL MEDICINE

## 2021-06-16 ENCOUNTER — TELEPHONE (OUTPATIENT)
Dept: HEMATOLOGY/ONCOLOGY | Facility: CLINIC | Age: 81
End: 2021-06-16

## 2021-06-22 ENCOUNTER — TELEPHONE (OUTPATIENT)
Dept: HEMATOLOGY/ONCOLOGY | Facility: CLINIC | Age: 81
End: 2021-06-22

## 2021-07-09 ENCOUNTER — OFFICE VISIT (OUTPATIENT)
Dept: PODIATRY | Facility: CLINIC | Age: 81
End: 2021-07-09
Payer: MEDICARE

## 2021-07-09 DIAGNOSIS — L60.2 OG (ONYCHOGRYPHOSIS): ICD-10-CM

## 2021-07-09 DIAGNOSIS — I87.2 VENOUS INSUFFICIENCY OF BOTH LOWER EXTREMITIES: ICD-10-CM

## 2021-07-09 DIAGNOSIS — L85.1 ACQUIRED KERATODERMA: ICD-10-CM

## 2021-07-09 DIAGNOSIS — I73.9 PERIPHERAL VASCULAR DISEASE: Primary | ICD-10-CM

## 2021-07-09 PROCEDURE — 11721 DEBRIDE NAIL 6 OR MORE: CPT | Mod: 59,Q8,S$GLB, | Performed by: PODIATRIST

## 2021-07-09 PROCEDURE — 1125F AMNT PAIN NOTED PAIN PRSNT: CPT | Mod: S$GLB,,, | Performed by: PODIATRIST

## 2021-07-09 PROCEDURE — 99213 OFFICE O/P EST LOW 20 MIN: CPT | Mod: 25,S$GLB,, | Performed by: PODIATRIST

## 2021-07-09 PROCEDURE — 3288F FALL RISK ASSESSMENT DOCD: CPT | Mod: CPTII,S$GLB,, | Performed by: PODIATRIST

## 2021-07-09 PROCEDURE — 99213 PR OFFICE/OUTPT VISIT, EST, LEVL III, 20-29 MIN: ICD-10-PCS | Mod: 25,S$GLB,, | Performed by: PODIATRIST

## 2021-07-09 PROCEDURE — 1159F PR MEDICATION LIST DOCUMENTED IN MEDICAL RECORD: ICD-10-PCS | Mod: S$GLB,,, | Performed by: PODIATRIST

## 2021-07-09 PROCEDURE — 11055 ROUTINE FOOT CARE: ICD-10-PCS | Mod: Q8,S$GLB,, | Performed by: PODIATRIST

## 2021-07-09 PROCEDURE — 1159F MED LIST DOCD IN RCRD: CPT | Mod: S$GLB,,, | Performed by: PODIATRIST

## 2021-07-09 PROCEDURE — 11721 ROUTINE FOOT CARE: ICD-10-PCS | Mod: 59,Q8,S$GLB, | Performed by: PODIATRIST

## 2021-07-09 PROCEDURE — 1100F PTFALLS ASSESS-DOCD GE2>/YR: CPT | Mod: CPTII,S$GLB,, | Performed by: PODIATRIST

## 2021-07-09 PROCEDURE — 3288F PR FALLS RISK ASSESSMENT DOCUMENTED: ICD-10-PCS | Mod: CPTII,S$GLB,, | Performed by: PODIATRIST

## 2021-07-09 PROCEDURE — 1100F PR PT FALLS ASSESS DOC 2+ FALLS/FALL W/INJURY/YR: ICD-10-PCS | Mod: CPTII,S$GLB,, | Performed by: PODIATRIST

## 2021-07-09 PROCEDURE — 1125F PR PAIN SEVERITY QUANTIFIED, PAIN PRESENT: ICD-10-PCS | Mod: S$GLB,,, | Performed by: PODIATRIST

## 2021-07-09 PROCEDURE — 11055 PARING/CUTG B9 HYPRKER LES 1: CPT | Mod: Q8,S$GLB,, | Performed by: PODIATRIST

## 2021-07-12 VITALS
DIASTOLIC BLOOD PRESSURE: 70 MMHG | HEART RATE: 74 BPM | OXYGEN SATURATION: 98 % | WEIGHT: 125 LBS | RESPIRATION RATE: 18 BRPM | SYSTOLIC BLOOD PRESSURE: 118 MMHG | HEIGHT: 69 IN | BODY MASS INDEX: 18.51 KG/M2

## 2021-07-15 ENCOUNTER — OFFICE VISIT (OUTPATIENT)
Dept: FAMILY MEDICINE | Facility: CLINIC | Age: 81
End: 2021-07-15
Payer: MEDICARE

## 2021-07-15 VITALS
WEIGHT: 126 LBS | SYSTOLIC BLOOD PRESSURE: 112 MMHG | BODY MASS INDEX: 18.66 KG/M2 | HEART RATE: 60 BPM | HEIGHT: 69 IN | DIASTOLIC BLOOD PRESSURE: 68 MMHG

## 2021-07-15 DIAGNOSIS — J01.00 ACUTE MAXILLARY SINUSITIS, RECURRENCE NOT SPECIFIED: Primary | ICD-10-CM

## 2021-07-15 DIAGNOSIS — D75.1 POLYCYTHEMIA: ICD-10-CM

## 2021-07-15 PROCEDURE — 3288F PR FALLS RISK ASSESSMENT DOCUMENTED: ICD-10-PCS | Mod: S$GLB,,, | Performed by: PHYSICIAN ASSISTANT

## 2021-07-15 PROCEDURE — 99214 PR OFFICE/OUTPT VISIT, EST, LEVL IV, 30-39 MIN: ICD-10-PCS | Mod: S$GLB,,, | Performed by: PHYSICIAN ASSISTANT

## 2021-07-15 PROCEDURE — 1101F PT FALLS ASSESS-DOCD LE1/YR: CPT | Mod: S$GLB,,, | Performed by: PHYSICIAN ASSISTANT

## 2021-07-15 PROCEDURE — 1159F MED LIST DOCD IN RCRD: CPT | Mod: S$GLB,,, | Performed by: PHYSICIAN ASSISTANT

## 2021-07-15 PROCEDURE — 3288F FALL RISK ASSESSMENT DOCD: CPT | Mod: S$GLB,,, | Performed by: PHYSICIAN ASSISTANT

## 2021-07-15 PROCEDURE — 1101F PR PT FALLS ASSESS DOC 0-1 FALLS W/OUT INJ PAST YR: ICD-10-PCS | Mod: S$GLB,,, | Performed by: PHYSICIAN ASSISTANT

## 2021-07-15 PROCEDURE — 99214 OFFICE O/P EST MOD 30 MIN: CPT | Mod: S$GLB,,, | Performed by: PHYSICIAN ASSISTANT

## 2021-07-15 PROCEDURE — 1159F PR MEDICATION LIST DOCUMENTED IN MEDICAL RECORD: ICD-10-PCS | Mod: S$GLB,,, | Performed by: PHYSICIAN ASSISTANT

## 2021-07-15 RX ORDER — FLUCONAZOLE 150 MG/1
150 TABLET ORAL DAILY
Qty: 3 TABLET | Refills: 0 | Status: SHIPPED | OUTPATIENT
Start: 2021-07-15 | End: 2021-07-18

## 2021-07-15 RX ORDER — CEFDINIR 300 MG/1
300 CAPSULE ORAL 2 TIMES DAILY
Qty: 20 CAPSULE | Refills: 0 | Status: SHIPPED | OUTPATIENT
Start: 2021-07-15 | End: 2021-07-25

## 2021-07-16 DIAGNOSIS — I48.0 PAROXYSMAL A-FIB: ICD-10-CM

## 2021-07-16 RX ORDER — METOPROLOL TARTRATE 25 MG/1
25 TABLET, FILM COATED ORAL DAILY
Qty: 30 TABLET | Refills: 5 | Status: SHIPPED | OUTPATIENT
Start: 2021-07-16 | End: 2022-01-28 | Stop reason: SDUPTHER

## 2021-07-19 ENCOUNTER — TELEPHONE (OUTPATIENT)
Dept: HEMATOLOGY/ONCOLOGY | Facility: CLINIC | Age: 81
End: 2021-07-19

## 2021-07-19 DIAGNOSIS — D45 POLYCYTHEMIA VERA: Primary | ICD-10-CM

## 2021-07-22 LAB
BASOPHILS # BLD AUTO: 109 CELLS/UL (ref 0–200)
BASOPHILS NFR BLD AUTO: 1.4 %
EOSINOPHIL # BLD AUTO: 156 CELLS/UL (ref 15–500)
EOSINOPHIL NFR BLD AUTO: 2 %
ERYTHROCYTE [DISTWIDTH] IN BLOOD BY AUTOMATED COUNT: 18.1 % (ref 11–15)
FERRITIN SERPL-MCNC: 95 NG/ML (ref 24–380)
HCT VFR BLD AUTO: 49.1 % (ref 38.5–50)
HGB BLD-MCNC: 15.8 G/DL (ref 13.2–17.1)
LYMPHOCYTES # BLD AUTO: 1357 CELLS/UL (ref 850–3900)
LYMPHOCYTES NFR BLD AUTO: 17.4 %
MCH RBC QN AUTO: 30.3 PG (ref 27–33)
MCHC RBC AUTO-ENTMCNC: 32.2 G/DL (ref 32–36)
MCV RBC AUTO: 94.2 FL (ref 80–100)
MONOCYTES # BLD AUTO: 858 CELLS/UL (ref 200–950)
MONOCYTES NFR BLD AUTO: 11 %
NEUTROPHILS # BLD AUTO: 5320 CELLS/UL (ref 1500–7800)
NEUTROPHILS NFR BLD AUTO: 68.2 %
PLATELET # BLD AUTO: 306 THOUSAND/UL (ref 140–400)
PMV BLD REES-ECKER: 11.7 FL (ref 7.5–12.5)
RBC # BLD AUTO: 5.21 MILLION/UL (ref 4.2–5.8)
WBC # BLD AUTO: 7.8 THOUSAND/UL (ref 3.8–10.8)

## 2021-07-26 ENCOUNTER — OFFICE VISIT (OUTPATIENT)
Dept: HEMATOLOGY/ONCOLOGY | Facility: CLINIC | Age: 81
End: 2021-07-26
Payer: MEDICARE

## 2021-07-26 VITALS
BODY MASS INDEX: 18.22 KG/M2 | DIASTOLIC BLOOD PRESSURE: 59 MMHG | SYSTOLIC BLOOD PRESSURE: 88 MMHG | RESPIRATION RATE: 16 BRPM | HEART RATE: 75 BPM | WEIGHT: 123 LBS | HEIGHT: 69 IN

## 2021-07-26 DIAGNOSIS — D45 POLYCYTHEMIA VERA: Primary | ICD-10-CM

## 2021-07-26 PROCEDURE — 1126F PR PAIN SEVERITY QUANTIFIED, NO PAIN PRESENT: ICD-10-PCS | Mod: S$GLB,,, | Performed by: INTERNAL MEDICINE

## 2021-07-26 PROCEDURE — 1159F MED LIST DOCD IN RCRD: CPT | Mod: S$GLB,,, | Performed by: INTERNAL MEDICINE

## 2021-07-26 PROCEDURE — 99214 OFFICE O/P EST MOD 30 MIN: CPT | Mod: S$GLB,,, | Performed by: INTERNAL MEDICINE

## 2021-07-26 PROCEDURE — 1159F PR MEDICATION LIST DOCUMENTED IN MEDICAL RECORD: ICD-10-PCS | Mod: S$GLB,,, | Performed by: INTERNAL MEDICINE

## 2021-07-26 PROCEDURE — 1126F AMNT PAIN NOTED NONE PRSNT: CPT | Mod: S$GLB,,, | Performed by: INTERNAL MEDICINE

## 2021-07-26 PROCEDURE — 99214 PR OFFICE/OUTPT VISIT, EST, LEVL IV, 30-39 MIN: ICD-10-PCS | Mod: S$GLB,,, | Performed by: INTERNAL MEDICINE

## 2021-07-26 RX ORDER — HYDROXYUREA 500 MG/1
CAPSULE ORAL
Qty: 45 CAPSULE | Refills: 6 | Status: SHIPPED | OUTPATIENT
Start: 2021-07-26 | End: 2022-04-06

## 2021-07-27 RX ORDER — FUROSEMIDE 20 MG/1
20 TABLET ORAL DAILY
Qty: 30 TABLET | Refills: 5 | Status: SHIPPED | OUTPATIENT
Start: 2021-07-27 | End: 2022-01-28 | Stop reason: SDUPTHER

## 2021-08-06 ENCOUNTER — TELEPHONE (OUTPATIENT)
Dept: FAMILY MEDICINE | Facility: CLINIC | Age: 81
End: 2021-08-06

## 2021-08-09 DIAGNOSIS — E78.2 MIXED HYPERLIPIDEMIA: ICD-10-CM

## 2021-08-09 DIAGNOSIS — K21.9 GASTRO-ESOPHAGEAL REFLUX DISEASE WITHOUT ESOPHAGITIS: ICD-10-CM

## 2021-08-09 RX ORDER — OMEPRAZOLE 20 MG/1
20 CAPSULE, DELAYED RELEASE ORAL DAILY
Qty: 90 CAPSULE | Refills: 1 | Status: SHIPPED | OUTPATIENT
Start: 2021-08-09 | End: 2022-01-28 | Stop reason: SDUPTHER

## 2021-08-09 RX ORDER — ROSUVASTATIN CALCIUM 10 MG/1
10 TABLET, COATED ORAL DAILY
Qty: 90 TABLET | Refills: 3 | Status: SHIPPED | OUTPATIENT
Start: 2021-08-09 | End: 2022-08-10 | Stop reason: SDUPTHER

## 2021-08-11 ENCOUNTER — TELEPHONE (OUTPATIENT)
Dept: FAMILY MEDICINE | Facility: CLINIC | Age: 81
End: 2021-08-11

## 2021-08-19 DIAGNOSIS — D75.1 POLYCYTHEMIA: Primary | ICD-10-CM

## 2021-08-20 LAB
BASOPHILS # BLD AUTO: 102 CELLS/UL (ref 0–200)
BASOPHILS NFR BLD AUTO: 1.4 %
EOSINOPHIL # BLD AUTO: 139 CELLS/UL (ref 15–500)
EOSINOPHIL NFR BLD AUTO: 1.9 %
ERYTHROCYTE [DISTWIDTH] IN BLOOD BY AUTOMATED COUNT: 21 % (ref 11–15)
HCT VFR BLD AUTO: 40.1 % (ref 38.5–50)
HGB BLD-MCNC: 13.8 G/DL (ref 13.2–17.1)
LYMPHOCYTES # BLD AUTO: 1197 CELLS/UL (ref 850–3900)
LYMPHOCYTES NFR BLD AUTO: 16.4 %
MCH RBC QN AUTO: 33.2 PG (ref 27–33)
MCHC RBC AUTO-ENTMCNC: 34.4 G/DL (ref 32–36)
MCV RBC AUTO: 96.4 FL (ref 80–100)
MONOCYTES # BLD AUTO: 635 CELLS/UL (ref 200–950)
MONOCYTES NFR BLD AUTO: 8.7 %
NEUTROPHILS # BLD AUTO: 5227 CELLS/UL (ref 1500–7800)
NEUTROPHILS NFR BLD AUTO: 71.6 %
PLATELET # BLD AUTO: 240 THOUSAND/UL (ref 140–400)
PMV BLD REES-ECKER: 11.4 FL (ref 7.5–12.5)
RBC # BLD AUTO: 4.16 MILLION/UL (ref 4.2–5.8)
WBC # BLD AUTO: 7.3 THOUSAND/UL (ref 3.8–10.8)

## 2021-08-23 ENCOUNTER — TELEPHONE (OUTPATIENT)
Dept: FAMILY MEDICINE | Facility: CLINIC | Age: 81
End: 2021-08-23

## 2021-09-14 DIAGNOSIS — E03.9 ACQUIRED HYPOTHYROIDISM: ICD-10-CM

## 2021-09-14 DIAGNOSIS — R63.4 WEIGHT LOSS: ICD-10-CM

## 2021-09-14 RX ORDER — LEVOTHYROXINE SODIUM 88 UG/1
88 TABLET ORAL
Qty: 90 TABLET | Refills: 1 | Status: SHIPPED | OUTPATIENT
Start: 2021-09-14 | End: 2021-09-24 | Stop reason: SDUPTHER

## 2021-09-14 RX ORDER — MEGESTROL ACETATE 40 MG/1
TABLET ORAL
Qty: 120 TABLET | Refills: 1 | Status: SHIPPED | OUTPATIENT
Start: 2021-09-14 | End: 2021-09-24 | Stop reason: SDUPTHER

## 2021-09-22 ENCOUNTER — CLINICAL SUPPORT (OUTPATIENT)
Dept: FAMILY MEDICINE | Facility: CLINIC | Age: 81
End: 2021-09-22
Payer: MEDICARE

## 2021-09-22 VITALS — TEMPERATURE: 98 F

## 2021-09-22 DIAGNOSIS — D75.1 POLYCYTHEMIA: Primary | ICD-10-CM

## 2021-09-22 DIAGNOSIS — I10 ESSENTIAL HYPERTENSION: ICD-10-CM

## 2021-09-22 DIAGNOSIS — Z23 NEED FOR INFLUENZA VACCINATION: Primary | ICD-10-CM

## 2021-09-22 PROCEDURE — 90662 FLU VACCINE - QUADRIVALENT - HIGH DOSE (65+) PRESERVATIVE FREE IM: ICD-10-PCS | Mod: S$GLB,,, | Performed by: FAMILY MEDICINE

## 2021-09-22 PROCEDURE — 90662 IIV NO PRSV INCREASED AG IM: CPT | Mod: S$GLB,,, | Performed by: FAMILY MEDICINE

## 2021-09-22 PROCEDURE — G0008 FLU VACCINE - QUADRIVALENT - HIGH DOSE (65+) PRESERVATIVE FREE IM: ICD-10-PCS | Mod: S$GLB,,, | Performed by: FAMILY MEDICINE

## 2021-09-22 PROCEDURE — G0008 ADMIN INFLUENZA VIRUS VAC: HCPCS | Mod: S$GLB,,, | Performed by: FAMILY MEDICINE

## 2021-09-23 LAB
BASOPHILS # BLD AUTO: 118 CELLS/UL (ref 0–200)
BASOPHILS NFR BLD AUTO: 1.4 %
BUN SERPL-MCNC: 15 MG/DL (ref 7–25)
BUN/CREAT SERPL: NORMAL (CALC) (ref 6–22)
CALCIUM SERPL-MCNC: 9.5 MG/DL (ref 8.6–10.3)
CHLORIDE SERPL-SCNC: 100 MMOL/L (ref 98–110)
CO2 SERPL-SCNC: 25 MMOL/L (ref 20–32)
CREAT SERPL-MCNC: 0.8 MG/DL (ref 0.7–1.11)
EOSINOPHIL # BLD AUTO: 160 CELLS/UL (ref 15–500)
EOSINOPHIL NFR BLD AUTO: 1.9 %
ERYTHROCYTE [DISTWIDTH] IN BLOOD BY AUTOMATED COUNT: 15.6 % (ref 11–15)
GLUCOSE SERPL-MCNC: 66 MG/DL (ref 65–139)
HCT VFR BLD AUTO: 38.1 % (ref 38.5–50)
HGB BLD-MCNC: 13.5 G/DL (ref 13.2–17.1)
LYMPHOCYTES # BLD AUTO: 1831 CELLS/UL (ref 850–3900)
LYMPHOCYTES NFR BLD AUTO: 21.8 %
MCH RBC QN AUTO: 36.2 PG (ref 27–33)
MCHC RBC AUTO-ENTMCNC: 35.4 G/DL (ref 32–36)
MCV RBC AUTO: 102.1 FL (ref 80–100)
MONOCYTES # BLD AUTO: 857 CELLS/UL (ref 200–950)
MONOCYTES NFR BLD AUTO: 10.2 %
NEUTROPHILS # BLD AUTO: 5435 CELLS/UL (ref 1500–7800)
NEUTROPHILS NFR BLD AUTO: 64.7 %
PLATELET # BLD AUTO: 316 THOUSAND/UL (ref 140–400)
PMV BLD REES-ECKER: 11.1 FL (ref 7.5–12.5)
POTASSIUM SERPL-SCNC: 4.2 MMOL/L (ref 3.5–5.3)
RBC # BLD AUTO: 3.73 MILLION/UL (ref 4.2–5.8)
SODIUM SERPL-SCNC: 139 MMOL/L (ref 135–146)
WBC # BLD AUTO: 8.4 THOUSAND/UL (ref 3.8–10.8)

## 2021-09-24 DIAGNOSIS — R63.4 WEIGHT LOSS: ICD-10-CM

## 2021-09-24 DIAGNOSIS — E03.9 ACQUIRED HYPOTHYROIDISM: ICD-10-CM

## 2021-09-24 RX ORDER — MEGESTROL ACETATE 40 MG/1
TABLET ORAL
Qty: 120 TABLET | Refills: 1 | Status: SHIPPED | OUTPATIENT
Start: 2021-09-24 | End: 2021-12-29 | Stop reason: SDUPTHER

## 2021-09-24 RX ORDER — LEVOTHYROXINE SODIUM 88 UG/1
88 TABLET ORAL
Qty: 90 TABLET | Refills: 1 | Status: SHIPPED | OUTPATIENT
Start: 2021-09-24 | End: 2022-03-28 | Stop reason: SDUPTHER

## 2021-09-27 ENCOUNTER — TELEPHONE (OUTPATIENT)
Dept: FAMILY MEDICINE | Facility: CLINIC | Age: 81
End: 2021-09-27

## 2021-10-11 DIAGNOSIS — M1A.0790 IDIOPATHIC CHRONIC GOUT OF FOOT WITHOUT TOPHUS, UNSPECIFIED LATERALITY: ICD-10-CM

## 2021-10-11 RX ORDER — ALLOPURINOL 100 MG/1
100 TABLET ORAL DAILY
Qty: 90 TABLET | Refills: 1 | Status: SHIPPED | OUTPATIENT
Start: 2021-10-11 | End: 2022-04-04 | Stop reason: SDUPTHER

## 2021-10-21 ENCOUNTER — TELEPHONE (OUTPATIENT)
Dept: FAMILY MEDICINE | Facility: CLINIC | Age: 81
End: 2021-10-21
Payer: MEDICARE

## 2021-10-21 DIAGNOSIS — D75.1 POLYCYTHEMIA: Primary | ICD-10-CM

## 2021-11-22 ENCOUNTER — TELEPHONE (OUTPATIENT)
Dept: FAMILY MEDICINE | Facility: CLINIC | Age: 81
End: 2021-11-22
Payer: MEDICARE

## 2021-11-29 DIAGNOSIS — G31.84 MCI (MILD COGNITIVE IMPAIRMENT): ICD-10-CM

## 2021-11-29 RX ORDER — DONEPEZIL HYDROCHLORIDE 10 MG/1
10 TABLET, FILM COATED ORAL NIGHTLY
Qty: 90 TABLET | Refills: 1 | Status: SHIPPED | OUTPATIENT
Start: 2021-11-29 | End: 2022-06-03 | Stop reason: SDUPTHER

## 2021-12-07 NOTE — TELEPHONE ENCOUNTER
----- Message from Cem Maguire sent at 1/15/2019  1:41 PM CST -----  Patient would like office to call his PCP to get medical records and imaging as he does not carry it around - Jake Kovacs MD   Lab Facility: 0 Bill For Surgical Tray: no Billing Type: Third-Party Bill Performing Laboratory: [id number] Expected Date Of Service: 12/07/2021

## 2021-12-20 RX ORDER — OXYBUTYNIN CHLORIDE 5 MG/1
TABLET ORAL
Qty: 60 TABLET | Refills: 2 | Status: SHIPPED | OUTPATIENT
Start: 2021-12-20 | End: 2022-03-28 | Stop reason: SDUPTHER

## 2021-12-29 DIAGNOSIS — R63.4 WEIGHT LOSS: ICD-10-CM

## 2021-12-29 RX ORDER — MEGESTROL ACETATE 40 MG/1
TABLET ORAL
Qty: 120 TABLET | Refills: 1 | Status: SHIPPED | OUTPATIENT
Start: 2021-12-29 | End: 2022-07-22 | Stop reason: SDUPTHER

## 2022-01-28 ENCOUNTER — TELEPHONE (OUTPATIENT)
Dept: FAMILY MEDICINE | Facility: CLINIC | Age: 82
End: 2022-01-28
Payer: MEDICARE

## 2022-01-28 DIAGNOSIS — K21.9 GASTRO-ESOPHAGEAL REFLUX DISEASE WITHOUT ESOPHAGITIS: ICD-10-CM

## 2022-01-28 DIAGNOSIS — I48.0 PAROXYSMAL A-FIB: ICD-10-CM

## 2022-01-28 LAB
BASOPHILS # BLD AUTO: 104 CELLS/UL (ref 0–200)
BASOPHILS NFR BLD AUTO: 1.3 %
EOSINOPHIL # BLD AUTO: 128 CELLS/UL (ref 15–500)
EOSINOPHIL NFR BLD AUTO: 1.6 %
ERYTHROCYTE [DISTWIDTH] IN BLOOD BY AUTOMATED COUNT: 13.8 % (ref 11–15)
HCT VFR BLD AUTO: 36.6 % (ref 38.5–50)
HGB BLD-MCNC: 12.6 G/DL (ref 13.2–17.1)
LYMPHOCYTES # BLD AUTO: 1512 CELLS/UL (ref 850–3900)
LYMPHOCYTES NFR BLD AUTO: 18.9 %
MCH RBC QN AUTO: 36.6 PG (ref 27–33)
MCHC RBC AUTO-ENTMCNC: 34.4 G/DL (ref 32–36)
MCV RBC AUTO: 106.4 FL (ref 80–100)
MONOCYTES # BLD AUTO: 784 CELLS/UL (ref 200–950)
MONOCYTES NFR BLD AUTO: 9.8 %
NEUTROPHILS # BLD AUTO: 5472 CELLS/UL (ref 1500–7800)
NEUTROPHILS NFR BLD AUTO: 68.4 %
PLATELET # BLD AUTO: 302 THOUSAND/UL (ref 140–400)
PMV BLD REES-ECKER: 10.5 FL (ref 7.5–12.5)
RBC # BLD AUTO: 3.44 MILLION/UL (ref 4.2–5.8)
WBC # BLD AUTO: 8 THOUSAND/UL (ref 3.8–10.8)

## 2022-01-28 RX ORDER — FUROSEMIDE 20 MG/1
20 TABLET ORAL DAILY
Qty: 30 TABLET | Refills: 5 | Status: SHIPPED | OUTPATIENT
Start: 2022-01-28 | End: 2022-08-10 | Stop reason: SDUPTHER

## 2022-01-28 RX ORDER — OMEPRAZOLE 20 MG/1
20 CAPSULE, DELAYED RELEASE ORAL DAILY
Qty: 90 CAPSULE | Refills: 3 | Status: SHIPPED | OUTPATIENT
Start: 2022-01-28 | End: 2023-01-30 | Stop reason: SDUPTHER

## 2022-01-28 RX ORDER — METOPROLOL TARTRATE 25 MG/1
25 TABLET, FILM COATED ORAL DAILY
Qty: 30 TABLET | Refills: 5 | Status: SHIPPED | OUTPATIENT
Start: 2022-01-28 | End: 2022-08-10 | Stop reason: SDUPTHER

## 2022-01-28 NOTE — TELEPHONE ENCOUNTER
----- Message from Bernie Almeida sent at 1/28/2022  8:08 AM CST -----  Refills Omeprazole 90 day Metoprolol 30 day and Furosemide 30 day.  [facility] [address]. Pt's # [phone] GH

## 2022-01-31 ENCOUNTER — TELEPHONE (OUTPATIENT)
Dept: FAMILY MEDICINE | Facility: CLINIC | Age: 82
End: 2022-01-31
Payer: MEDICARE

## 2022-01-31 NOTE — TELEPHONE ENCOUNTER
----- Message from Jake Kovacs MD sent at 1/29/2022  5:09 PM CST -----  Call patient's daughter, Bernie.  Mildly anemic hematocrit 36.6.  No polycythemia.  White blood cells are normal.  Continue current medications.

## 2022-01-31 NOTE — TELEPHONE ENCOUNTER
Spoke to daughter, Bernie, with results verbatim per Dr Kovacs. Wants to know if Dr Kovacs wants to do anything for anemia. Said he looks pale lately and is not on any iron supplements.

## 2022-02-08 ENCOUNTER — TELEPHONE (OUTPATIENT)
Dept: HEMATOLOGY/ONCOLOGY | Facility: CLINIC | Age: 82
End: 2022-02-08
Payer: MEDICARE

## 2022-02-08 NOTE — TELEPHONE ENCOUNTER
Per Dr. Clark, pt to decrease hydrea down to 1 po daily. Bernie verbalized understanding. Will recheck labs in 1 month.

## 2022-02-08 NOTE — TELEPHONE ENCOUNTER
----- Message from Jodi Marcos, Patient Care Assistant sent at 2/7/2022  2:28 PM CST -----  Regarding: Labs  Patient Daughter (Bernie) called in stating the patient has been too weak lately, she would like for someone to take a look at his Labs.  # [phone]

## 2022-03-28 DIAGNOSIS — E03.9 ACQUIRED HYPOTHYROIDISM: ICD-10-CM

## 2022-03-28 RX ORDER — LEVOTHYROXINE SODIUM 88 UG/1
88 TABLET ORAL
Qty: 90 TABLET | Refills: 1 | Status: SHIPPED | OUTPATIENT
Start: 2022-03-28 | End: 2022-09-27 | Stop reason: SDUPTHER

## 2022-03-28 RX ORDER — OXYBUTYNIN CHLORIDE 5 MG/1
TABLET ORAL
Qty: 60 TABLET | Refills: 2 | Status: SHIPPED | OUTPATIENT
Start: 2022-03-28 | End: 2022-07-22 | Stop reason: SDUPTHER

## 2022-03-28 NOTE — TELEPHONE ENCOUNTER
----- Message from Bernie Almeida sent at 3/28/2022  8:12 AM CDT -----  Refills Levothyroxine and Oxybutynin. [facility] [address]. The patient had his labs last week. Can you look at his labs for me. I will send a message to Dr. Clark he takes about a week to get back to me. GH

## 2022-03-29 ENCOUNTER — TELEPHONE (OUTPATIENT)
Dept: HEMATOLOGY/ONCOLOGY | Facility: CLINIC | Age: 82
End: 2022-03-29
Payer: MEDICARE

## 2022-03-29 NOTE — TELEPHONE ENCOUNTER
----- Message from Rabia Hamilton sent at 3/28/2022  3:20 PM CDT -----  Bernie the patient's daughter called and said that the patient had his lab done and they want to know if he needs to change the dosage of his hydrea. Her call back number is [phone] (tomorrow is fine)

## 2022-03-29 NOTE — TELEPHONE ENCOUNTER
Pt to continue hydrea 1 po daily at this time. Recheck labs in 1 month. Pt daughter Bernie verbalized understanding.

## 2022-04-04 DIAGNOSIS — M1A.0790 IDIOPATHIC CHRONIC GOUT OF FOOT WITHOUT TOPHUS, UNSPECIFIED LATERALITY: ICD-10-CM

## 2022-04-04 RX ORDER — ALLOPURINOL 100 MG/1
100 TABLET ORAL DAILY
Qty: 90 TABLET | Refills: 1 | Status: SHIPPED | OUTPATIENT
Start: 2022-04-04 | End: 2022-10-31 | Stop reason: SDUPTHER

## 2022-04-04 NOTE — TELEPHONE ENCOUNTER
----- Message from Bernie Almeida sent at 4/4/2022  9:10 AM CDT -----  Refill Allopurinol [facility] [address]. Pt's # [phone] GH

## 2022-04-22 ENCOUNTER — TELEPHONE (OUTPATIENT)
Dept: FAMILY MEDICINE | Facility: CLINIC | Age: 82
End: 2022-04-22

## 2022-04-22 DIAGNOSIS — J02.9 PHARYNGITIS, UNSPECIFIED ETIOLOGY: Primary | ICD-10-CM

## 2022-04-22 RX ORDER — AMOXICILLIN 500 MG/1
500 CAPSULE ORAL 3 TIMES DAILY
Qty: 30 CAPSULE | Refills: 0 | Status: SHIPPED | OUTPATIENT
Start: 2022-04-22 | End: 2022-07-25

## 2022-04-22 NOTE — TELEPHONE ENCOUNTER
After hours: [facility] states pt is allergic to abx  sent in. Ok per  to give verbal for keflex 500mg TID #21. Spoke with Jim @ [facility].

## 2022-05-03 DIAGNOSIS — R41.82 ALTERED MENTAL STATUS, UNSPECIFIED ALTERED MENTAL STATUS TYPE: Primary | ICD-10-CM

## 2022-05-04 ENCOUNTER — OUTSIDE PLACE OF SERVICE (OUTPATIENT)
Dept: FAMILY MEDICINE | Facility: CLINIC | Age: 82
End: 2022-05-04
Payer: MEDICARE

## 2022-05-04 DIAGNOSIS — G93.1 ANOXIC ENCEPHALOPATHY: ICD-10-CM

## 2022-05-04 DIAGNOSIS — K59.00 CONSTIPATION: ICD-10-CM

## 2022-05-04 DIAGNOSIS — Z74.1 NEED FOR ASSISTANCE WITH PERSONAL CARE: ICD-10-CM

## 2022-05-04 DIAGNOSIS — Z95.1 S/P CABG (CORONARY ARTERY BYPASS GRAFT): Chronic | ICD-10-CM

## 2022-05-04 PROCEDURE — 99349 HOME/RES VST EST MOD MDM 40: CPT | Mod: S$GLB,,, | Performed by: FAMILY MEDICINE

## 2022-05-04 PROCEDURE — 1159F MED LIST DOCD IN RCRD: CPT | Mod: CPTII,S$GLB,, | Performed by: FAMILY MEDICINE

## 2022-05-04 PROCEDURE — 1159F PR MEDICATION LIST DOCUMENTED IN MEDICAL RECORD: ICD-10-PCS | Mod: CPTII,S$GLB,, | Performed by: FAMILY MEDICINE

## 2022-05-04 PROCEDURE — 1160F PR REVIEW ALL MEDS BY PRESCRIBER/CLIN PHARMACIST DOCUMENTED: ICD-10-PCS | Mod: CPTII,S$GLB,, | Performed by: FAMILY MEDICINE

## 2022-05-04 PROCEDURE — 1160F RVW MEDS BY RX/DR IN RCRD: CPT | Mod: CPTII,S$GLB,, | Performed by: FAMILY MEDICINE

## 2022-05-04 PROCEDURE — 99349 PR HOME VISIT,ESTAB PATIENT,LEVEL III: ICD-10-PCS | Mod: S$GLB,,, | Performed by: FAMILY MEDICINE

## 2022-05-05 DIAGNOSIS — G93.1 ANOXIC ENCEPHALOPATHY: Primary | ICD-10-CM

## 2022-05-05 RX ORDER — MEMANTINE HYDROCHLORIDE 5 MG/1
TABLET ORAL
Qty: 60 TABLET | Refills: 5 | Status: SHIPPED | OUTPATIENT
Start: 2022-05-05 | End: 2022-12-21

## 2022-05-25 ENCOUNTER — TELEPHONE (OUTPATIENT)
Dept: FAMILY MEDICINE | Facility: CLINIC | Age: 82
End: 2022-05-25

## 2022-05-25 DIAGNOSIS — D75.1 POLYCYTHEMIA: Primary | ICD-10-CM

## 2022-05-25 NOTE — TELEPHONE ENCOUNTER
----- Message from Bernie Almeida sent at 5/25/2022  2:00 PM CDT -----  Helizz Kovacs please do not forget [facility] yellow paper. Her has not had a cbc since  March. What should do?  GH

## 2022-05-30 ENCOUNTER — TELEPHONE (OUTPATIENT)
Dept: FAMILY MEDICINE | Facility: CLINIC | Age: 82
End: 2022-05-30

## 2022-05-30 NOTE — TELEPHONE ENCOUNTER
----- Message from Jake Kovacs MD sent at 5/29/2022  3:08 PM CDT -----  Call Maria Esther, patient's daughter.  His CBC looks normal with a normal white blood cell ,normal hematocrit ,and normal platelets.  No treatment needed.

## 2022-06-03 DIAGNOSIS — G31.84 MCI (MILD COGNITIVE IMPAIRMENT): ICD-10-CM

## 2022-06-03 RX ORDER — DONEPEZIL HYDROCHLORIDE 10 MG/1
10 TABLET, FILM COATED ORAL NIGHTLY
Qty: 90 TABLET | Refills: 1 | Status: SHIPPED | OUTPATIENT
Start: 2022-06-03 | End: 2022-11-15 | Stop reason: SDUPTHER

## 2022-06-03 NOTE — TELEPHONE ENCOUNTER
----- Message from Bernie Almeida sent at 6/3/2022 12:16 PM CDT -----  Refill Donepezil. He is out. [facility] [address]. Pt's # [phone] GH

## 2022-06-29 NOTE — TELEPHONE ENCOUNTER
----- Message from Jake Kovacs MD sent at 9/27/2020  6:05 PM CDT -----  Call patient's wife.  Dr. Peralta  the pulmonologist, stated he needs no further CT scans as this is a chronic scarring problem.   vaginal bleeding/abdominal pain

## 2022-07-08 ENCOUNTER — TELEPHONE (OUTPATIENT)
Dept: FAMILY MEDICINE | Facility: CLINIC | Age: 82
End: 2022-07-08

## 2022-07-08 DIAGNOSIS — J06.9 UPPER RESPIRATORY TRACT INFECTION, UNSPECIFIED TYPE: Primary | ICD-10-CM

## 2022-07-08 RX ORDER — BENZONATATE 100 MG/1
100 CAPSULE ORAL 3 TIMES DAILY PRN
Qty: 30 CAPSULE | Refills: 0 | Status: SHIPPED | OUTPATIENT
Start: 2022-07-08 | End: 2022-07-18

## 2022-07-08 RX ORDER — AZITHROMYCIN 250 MG/1
TABLET, FILM COATED ORAL
Qty: 6 TABLET | Refills: 0 | Status: SHIPPED | OUTPATIENT
Start: 2022-07-08 | End: 2022-07-13

## 2022-07-08 NOTE — TELEPHONE ENCOUNTER
Patient's daughter will check rapid COVID test at home to ensure negative.  Requests antibiotic and cough medicine sent to pharmacy.  Rest and push fluids over the weekend.  Should patient worsen or become severely weak and trouble breathing will get to the ER for further evaluation.

## 2022-07-08 NOTE — TELEPHONE ENCOUNTER
----- Message from Deepa Gonzales sent at 7/8/2022 10:06 AM CDT -----  Patient daughter (Bernie) called and stated that he has a very bad cough, headache,head and chest congestion and she would like to see if the doctor can call something in for him he is allergic to penicilln at [facility] if any questions give her a call at [phone]

## 2022-07-22 DIAGNOSIS — R63.4 WEIGHT LOSS: ICD-10-CM

## 2022-07-22 RX ORDER — MEGESTROL ACETATE 40 MG/1
TABLET ORAL
Qty: 120 TABLET | Refills: 1 | Status: SHIPPED | OUTPATIENT
Start: 2022-07-22 | End: 2022-11-14 | Stop reason: SDUPTHER

## 2022-07-22 RX ORDER — OXYBUTYNIN CHLORIDE 5 MG/1
TABLET ORAL
Qty: 60 TABLET | Refills: 2 | Status: SHIPPED | OUTPATIENT
Start: 2022-07-22 | End: 2022-12-06 | Stop reason: SDUPTHER

## 2022-07-22 NOTE — TELEPHONE ENCOUNTER
----- Message from Bernie Almeida sent at 7/22/2022  7:52 AM CDT -----  Refill Oxybutynin 2 pills a day and Megestrol [facility] [address]. Pt's # [phone] GH

## 2022-07-25 ENCOUNTER — HOSPITAL ENCOUNTER (OUTPATIENT)
Facility: HOSPITAL | Age: 82
Discharge: HOME | End: 2022-07-26
Attending: EMERGENCY MEDICINE | Admitting: INTERNAL MEDICINE
Payer: MEDICARE

## 2022-07-25 DIAGNOSIS — R55 SYNCOPE, UNSPECIFIED SYNCOPE TYPE: Primary | ICD-10-CM

## 2022-07-25 DIAGNOSIS — R41.89 UNRESPONSIVE: ICD-10-CM

## 2022-07-25 DIAGNOSIS — R55 SYNCOPE: ICD-10-CM

## 2022-07-25 PROBLEM — G45.9 TIA (TRANSIENT ISCHEMIC ATTACK): Status: ACTIVE | Noted: 2022-07-25

## 2022-07-25 PROBLEM — R41.82 AMS (ALTERED MENTAL STATUS): Status: ACTIVE | Noted: 2022-07-25

## 2022-07-25 PROBLEM — I50.9 CHRONIC CONGESTIVE HEART FAILURE: Status: ACTIVE | Noted: 2022-07-25

## 2022-07-25 LAB
ALBUMIN SERPL BCP-MCNC: 4.4 G/DL (ref 3.5–5.2)
ALP SERPL-CCNC: 59 U/L (ref 55–135)
ALT SERPL W/O P-5'-P-CCNC: 14 U/L (ref 10–44)
AMPHET+METHAMPHET UR QL: NEGATIVE
ANION GAP SERPL CALC-SCNC: 9 MMOL/L (ref 8–16)
AST SERPL-CCNC: 18 U/L (ref 10–40)
BARBITURATES UR QL SCN>200 NG/ML: NEGATIVE
BASOPHILS # BLD AUTO: 0.13 K/UL (ref 0–0.2)
BASOPHILS NFR BLD: 1.4 % (ref 0–1.9)
BENZODIAZ UR QL SCN>200 NG/ML: NEGATIVE
BILIRUB SERPL-MCNC: 0.9 MG/DL (ref 0.1–1)
BILIRUB UR QL STRIP: NEGATIVE
BNP SERPL-MCNC: 135 PG/ML (ref 0–99)
BNP SERPL-MCNC: 135 PG/ML (ref 0–99)
BUN SERPL-MCNC: 25 MG/DL (ref 8–23)
BZE UR QL SCN: NEGATIVE
CALCIUM SERPL-MCNC: 9.3 MG/DL (ref 8.7–10.5)
CANNABINOIDS UR QL SCN: NEGATIVE
CHLORIDE SERPL-SCNC: 105 MMOL/L (ref 95–110)
CLARITY UR: CLEAR
CO2 SERPL-SCNC: 24 MMOL/L (ref 23–29)
COLOR UR: YELLOW
CREAT SERPL-MCNC: 0.8 MG/DL (ref 0.5–1.4)
CREAT UR-MCNC: 64 MG/DL (ref 23–375)
DIFFERENTIAL METHOD: ABNORMAL
EOSINOPHIL # BLD AUTO: 0.2 K/UL (ref 0–0.5)
EOSINOPHIL NFR BLD: 2.3 % (ref 0–8)
ERYTHROCYTE [DISTWIDTH] IN BLOOD BY AUTOMATED COUNT: 13.9 % (ref 11.5–14.5)
EST. GFR  (AFRICAN AMERICAN): >60 ML/MIN/1.73 M^2
EST. GFR  (NON AFRICAN AMERICAN): >60 ML/MIN/1.73 M^2
GLUCOSE SERPL-MCNC: 101 MG/DL (ref 70–110)
GLUCOSE SERPL-MCNC: 74 MG/DL (ref 70–110)
GLUCOSE UR QL STRIP: NEGATIVE
HCT VFR BLD AUTO: 40 % (ref 40–54)
HGB BLD-MCNC: 13.3 G/DL (ref 14–18)
HGB UR QL STRIP: NEGATIVE
IMM GRANULOCYTES # BLD AUTO: 0.06 K/UL (ref 0–0.04)
IMM GRANULOCYTES NFR BLD AUTO: 0.6 % (ref 0–0.5)
KETONES UR QL STRIP: NEGATIVE
LACTATE SERPL-SCNC: 1 MMOL/L (ref 0.5–1.9)
LEUKOCYTE ESTERASE UR QL STRIP: NEGATIVE
LYMPHOCYTES # BLD AUTO: 1.2 K/UL (ref 1–4.8)
LYMPHOCYTES NFR BLD: 12.7 % (ref 18–48)
MCH RBC QN AUTO: 33.9 PG (ref 27–31)
MCHC RBC AUTO-ENTMCNC: 33.3 G/DL (ref 32–36)
MCV RBC AUTO: 102 FL (ref 82–98)
MONOCYTES # BLD AUTO: 0.9 K/UL (ref 0.3–1)
MONOCYTES NFR BLD: 9.2 % (ref 4–15)
NEUTROPHILS # BLD AUTO: 7 K/UL (ref 1.8–7.7)
NEUTROPHILS NFR BLD: 73.8 % (ref 38–73)
NITRITE UR QL STRIP: NEGATIVE
NRBC BLD-RTO: 0 /100 WBC
OPIATES UR QL SCN: NEGATIVE
PCP UR QL SCN>25 NG/ML: NEGATIVE
PH UR STRIP: 7 [PH] (ref 5–8)
PLATELET # BLD AUTO: 340 K/UL (ref 150–450)
PLATELET BLD QL SMEAR: ABNORMAL
PMV BLD AUTO: 11.1 FL (ref 9.2–12.9)
POTASSIUM SERPL-SCNC: 3.5 MMOL/L (ref 3.5–5.1)
PROCALCITONIN SERPL IA-MCNC: <0.05 NG/ML (ref 0–0.5)
PROT SERPL-MCNC: 7.1 G/DL (ref 6–8.4)
PROT UR QL STRIP: NEGATIVE
RBC # BLD AUTO: 3.92 M/UL (ref 4.6–6.2)
SARS-COV-2 RDRP RESP QL NAA+PROBE: NEGATIVE
SODIUM SERPL-SCNC: 138 MMOL/L (ref 136–145)
SP GR UR STRIP: 1.01 (ref 1–1.03)
TOXICOLOGY INFORMATION: NORMAL
TROPONIN I SERPL DL<=0.01 NG/ML-MCNC: <0.03 NG/ML
TROPONIN I SERPL DL<=0.01 NG/ML-MCNC: <0.03 NG/ML
TSH SERPL DL<=0.005 MIU/L-ACNC: 1.08 UIU/ML (ref 0.34–5.6)
URN SPEC COLLECT METH UR: NORMAL
UROBILINOGEN UR STRIP-ACNC: NEGATIVE EU/DL
WBC # BLD AUTO: 9.47 K/UL (ref 3.9–12.7)

## 2022-07-25 PROCEDURE — 80053 COMPREHEN METABOLIC PANEL: CPT | Performed by: NURSE PRACTITIONER

## 2022-07-25 PROCEDURE — G0378 HOSPITAL OBSERVATION PER HR: HCPCS

## 2022-07-25 PROCEDURE — 85025 COMPLETE CBC W/AUTO DIFF WBC: CPT | Performed by: NURSE PRACTITIONER

## 2022-07-25 PROCEDURE — 96365 THER/PROPH/DIAG IV INF INIT: CPT

## 2022-07-25 PROCEDURE — 63600175 PHARM REV CODE 636 W HCPCS: Performed by: NURSE PRACTITIONER

## 2022-07-25 PROCEDURE — 96372 THER/PROPH/DIAG INJ SC/IM: CPT | Performed by: NURSE PRACTITIONER

## 2022-07-25 PROCEDURE — 87040 BLOOD CULTURE FOR BACTERIA: CPT | Mod: 59 | Performed by: EMERGENCY MEDICINE

## 2022-07-25 PROCEDURE — 25000003 PHARM REV CODE 250: Performed by: EMERGENCY MEDICINE

## 2022-07-25 PROCEDURE — 63600175 PHARM REV CODE 636 W HCPCS: Performed by: EMERGENCY MEDICINE

## 2022-07-25 PROCEDURE — 84484 ASSAY OF TROPONIN QUANT: CPT | Mod: 91 | Performed by: NURSE PRACTITIONER

## 2022-07-25 PROCEDURE — 36415 COLL VENOUS BLD VENIPUNCTURE: CPT | Performed by: NURSE PRACTITIONER

## 2022-07-25 PROCEDURE — 25000003 PHARM REV CODE 250: Performed by: NURSE PRACTITIONER

## 2022-07-25 PROCEDURE — 83605 ASSAY OF LACTIC ACID: CPT | Performed by: EMERGENCY MEDICINE

## 2022-07-25 PROCEDURE — 81003 URINALYSIS AUTO W/O SCOPE: CPT | Performed by: NURSE PRACTITIONER

## 2022-07-25 PROCEDURE — 99285 EMERGENCY DEPT VISIT HI MDM: CPT | Mod: 25

## 2022-07-25 PROCEDURE — 84484 ASSAY OF TROPONIN QUANT: CPT | Performed by: NURSE PRACTITIONER

## 2022-07-25 PROCEDURE — 82962 GLUCOSE BLOOD TEST: CPT

## 2022-07-25 PROCEDURE — 93005 ELECTROCARDIOGRAM TRACING: CPT | Performed by: INTERNAL MEDICINE

## 2022-07-25 PROCEDURE — 84443 ASSAY THYROID STIM HORMONE: CPT | Performed by: NURSE PRACTITIONER

## 2022-07-25 PROCEDURE — 84145 PROCALCITONIN (PCT): CPT | Performed by: NURSE PRACTITIONER

## 2022-07-25 PROCEDURE — 93010 EKG 12-LEAD: ICD-10-PCS | Mod: ,,, | Performed by: INTERNAL MEDICINE

## 2022-07-25 PROCEDURE — 93010 ELECTROCARDIOGRAM REPORT: CPT | Mod: ,,, | Performed by: INTERNAL MEDICINE

## 2022-07-25 PROCEDURE — 83880 ASSAY OF NATRIURETIC PEPTIDE: CPT | Performed by: NURSE PRACTITIONER

## 2022-07-25 PROCEDURE — U0002 COVID-19 LAB TEST NON-CDC: HCPCS | Performed by: EMERGENCY MEDICINE

## 2022-07-25 PROCEDURE — 80307 DRUG TEST PRSMV CHEM ANLYZR: CPT | Performed by: NURSE PRACTITIONER

## 2022-07-25 RX ORDER — FUROSEMIDE 20 MG/1
20 TABLET ORAL DAILY
Status: DISCONTINUED | OUTPATIENT
Start: 2022-07-26 | End: 2022-07-26 | Stop reason: HOSPADM

## 2022-07-25 RX ORDER — CYANOCOBALAMIN (VITAMIN B-12) 250 MCG
500 TABLET ORAL DAILY
Status: DISCONTINUED | OUTPATIENT
Start: 2022-07-26 | End: 2022-07-26

## 2022-07-25 RX ORDER — SODIUM CHLORIDE 0.9 % (FLUSH) 0.9 %
10 SYRINGE (ML) INJECTION
Status: DISCONTINUED | OUTPATIENT
Start: 2022-07-25 | End: 2022-07-26 | Stop reason: HOSPADM

## 2022-07-25 RX ORDER — METOPROLOL TARTRATE 25 MG/1
25 TABLET, FILM COATED ORAL DAILY
Status: DISCONTINUED | OUTPATIENT
Start: 2022-07-26 | End: 2022-07-26 | Stop reason: HOSPADM

## 2022-07-25 RX ORDER — SODIUM CHLORIDE, SODIUM LACTATE, POTASSIUM CHLORIDE, CALCIUM CHLORIDE 600; 310; 30; 20 MG/100ML; MG/100ML; MG/100ML; MG/100ML
1000 INJECTION, SOLUTION INTRAVENOUS
Status: COMPLETED | OUTPATIENT
Start: 2022-07-25 | End: 2022-07-25

## 2022-07-25 RX ORDER — ENOXAPARIN SODIUM 100 MG/ML
40 INJECTION SUBCUTANEOUS EVERY 24 HOURS
Status: DISCONTINUED | OUTPATIENT
Start: 2022-07-25 | End: 2022-07-26 | Stop reason: HOSPADM

## 2022-07-25 RX ORDER — LEVOFLOXACIN 5 MG/ML
750 INJECTION, SOLUTION INTRAVENOUS
Status: COMPLETED | OUTPATIENT
Start: 2022-07-25 | End: 2022-07-25

## 2022-07-25 RX ORDER — ONDANSETRON 2 MG/ML
4 INJECTION INTRAMUSCULAR; INTRAVENOUS EVERY 8 HOURS PRN
Status: DISCONTINUED | OUTPATIENT
Start: 2022-07-25 | End: 2022-07-26 | Stop reason: HOSPADM

## 2022-07-25 RX ORDER — PANTOPRAZOLE SODIUM 40 MG/1
40 TABLET, DELAYED RELEASE ORAL DAILY
Status: DISCONTINUED | OUTPATIENT
Start: 2022-07-26 | End: 2022-07-26 | Stop reason: HOSPADM

## 2022-07-25 RX ORDER — HYDROXYUREA 500 MG/1
500 CAPSULE ORAL DAILY
Status: DISCONTINUED | OUTPATIENT
Start: 2022-07-26 | End: 2022-07-26 | Stop reason: HOSPADM

## 2022-07-25 RX ORDER — LEVOFLOXACIN 5 MG/ML
750 INJECTION, SOLUTION INTRAVENOUS
Status: DISCONTINUED | OUTPATIENT
Start: 2022-07-26 | End: 2022-07-26 | Stop reason: HOSPADM

## 2022-07-25 RX ORDER — ACETAMINOPHEN 325 MG/1
650 TABLET ORAL EVERY 4 HOURS PRN
Status: DISCONTINUED | OUTPATIENT
Start: 2022-07-25 | End: 2022-07-26 | Stop reason: HOSPADM

## 2022-07-25 RX ORDER — LEVOTHYROXINE SODIUM 88 UG/1
88 TABLET ORAL
Status: DISCONTINUED | OUTPATIENT
Start: 2022-07-26 | End: 2022-07-26 | Stop reason: HOSPADM

## 2022-07-25 RX ORDER — ATORVASTATIN CALCIUM 20 MG/1
20 TABLET, FILM COATED ORAL DAILY
Status: DISCONTINUED | OUTPATIENT
Start: 2022-07-26 | End: 2022-07-26 | Stop reason: HOSPADM

## 2022-07-25 RX ORDER — MEMANTINE HYDROCHLORIDE 5 MG/1
5 TABLET ORAL 2 TIMES DAILY
Status: DISCONTINUED | OUTPATIENT
Start: 2022-07-25 | End: 2022-07-26 | Stop reason: HOSPADM

## 2022-07-25 RX ORDER — DONEPEZIL HYDROCHLORIDE 5 MG/1
10 TABLET, FILM COATED ORAL NIGHTLY
Status: DISCONTINUED | OUTPATIENT
Start: 2022-07-25 | End: 2022-07-26 | Stop reason: HOSPADM

## 2022-07-25 RX ORDER — ASPIRIN 81 MG/1
81 TABLET ORAL NIGHTLY
Status: DISCONTINUED | OUTPATIENT
Start: 2022-07-25 | End: 2022-07-26 | Stop reason: HOSPADM

## 2022-07-25 RX ORDER — OXYBUTYNIN CHLORIDE 5 MG/1
5 TABLET ORAL 2 TIMES DAILY
Status: DISCONTINUED | OUTPATIENT
Start: 2022-07-25 | End: 2022-07-26 | Stop reason: HOSPADM

## 2022-07-25 RX ORDER — LATANOPROST 50 UG/ML
1 SOLUTION/ DROPS OPHTHALMIC NIGHTLY
Status: DISCONTINUED | OUTPATIENT
Start: 2022-07-25 | End: 2022-07-26 | Stop reason: HOSPADM

## 2022-07-25 RX ORDER — NITROGLYCERIN 0.4 MG/1
0.4 TABLET SUBLINGUAL EVERY 5 MIN PRN
Status: DISCONTINUED | OUTPATIENT
Start: 2022-07-25 | End: 2022-07-26 | Stop reason: HOSPADM

## 2022-07-25 RX ORDER — ALLOPURINOL 100 MG/1
100 TABLET ORAL DAILY
Status: DISCONTINUED | OUTPATIENT
Start: 2022-07-26 | End: 2022-07-26 | Stop reason: HOSPADM

## 2022-07-25 RX ORDER — ASPIRIN 325 MG
325 TABLET ORAL
Status: COMPLETED | OUTPATIENT
Start: 2022-07-25 | End: 2022-07-25

## 2022-07-25 RX ORDER — BRIMONIDINE TARTRATE 1.5 MG/ML
1 SOLUTION/ DROPS OPHTHALMIC 3 TIMES DAILY
Status: DISCONTINUED | OUTPATIENT
Start: 2022-07-25 | End: 2022-07-26 | Stop reason: HOSPADM

## 2022-07-25 RX ORDER — DOCUSATE SODIUM 100 MG/1
100 CAPSULE, LIQUID FILLED ORAL 2 TIMES DAILY
Status: DISCONTINUED | OUTPATIENT
Start: 2022-07-25 | End: 2022-07-26 | Stop reason: HOSPADM

## 2022-07-25 RX ORDER — DIPHENOXYLATE HYDROCHLORIDE AND ATROPINE SULFATE 2.5; .025 MG/1; MG/1
1 TABLET ORAL 4 TIMES DAILY PRN
Status: DISCONTINUED | OUTPATIENT
Start: 2022-07-25 | End: 2022-07-26 | Stop reason: HOSPADM

## 2022-07-25 RX ADMIN — BRIMONIDINE TARTRATE 1 DROP: 1.5 SOLUTION/ DROPS OPHTHALMIC at 10:07

## 2022-07-25 RX ADMIN — ENOXAPARIN SODIUM 40 MG: 40 INJECTION SUBCUTANEOUS at 10:07

## 2022-07-25 RX ADMIN — LATANOPROST 1 DROP: 50 SOLUTION OPHTHALMIC at 10:07

## 2022-07-25 RX ADMIN — DONEPEZIL HYDROCHLORIDE 10 MG: 5 TABLET, FILM COATED ORAL at 10:07

## 2022-07-25 RX ADMIN — LEVOFLOXACIN 750 MG: 750 INJECTION, SOLUTION INTRAVENOUS at 06:07

## 2022-07-25 RX ADMIN — SODIUM CHLORIDE, SODIUM LACTATE, POTASSIUM CHLORIDE, AND CALCIUM CHLORIDE 1000 ML: .6; .31; .03; .02 INJECTION, SOLUTION INTRAVENOUS at 06:07

## 2022-07-25 RX ADMIN — OXYBUTYNIN CHLORIDE 5 MG: 5 TABLET ORAL at 10:07

## 2022-07-25 RX ADMIN — ASPIRIN 325 MG ORAL TABLET 325 MG: 325 PILL ORAL at 03:07

## 2022-07-25 RX ADMIN — MEMANTINE 5 MG: 5 TABLET ORAL at 10:07

## 2022-07-25 NOTE — ED PROVIDER NOTES
Encounter Date: 7/25/2022       History     Chief Complaint   Patient presents with    Loss of Consciousness     Slumped over. Lips turned blue. Unresponsive x 1 minute.     82-year-old male presented emergency department after syncopal episode.  Patient was feeling sick and sat down and then passed out for about 1 minute and loss consciousness and is lips turn blue initially felt slightly confused however now is back to normal.  Patient denies any chest pain or shortness of breath or abdominal pain or any focal weakness or numbness.  Denies dysuria or hematuria.  Patient is back to normal at this time and is well-oriented has no complaints at this time.        Review of patient's allergies indicates:   Allergen Reactions    Doxycycline     Penicillins Other (See Comments)     Skin turns red or appears sunburned     Past Medical History:   Diagnosis Date    Chronic gout     Coronary artery disease     Hypertension     TERI (obstructive sleep apnea)     Polycythemia     Squamous cell carcinoma     Thyroid disease      Past Surgical History:   Procedure Laterality Date    ANGIOGRAM, CORONARY, WITH LEFT HEART CATHETERIZATION N/A 12/31/2020    Procedure: Angiogram, Coronary, with Left Heart Cath;  Surgeon: Jaz May MD;  Location: [facility] CATH/EP LAB;  Service: Cardiology;  Laterality: N/A;    CAROTID ARTERY ANGIOPLASTY  right    carotid endarderectomy      CATARACT EXTRACTION, BILATERAL      CATARACT EXTRACTION, BILATERAL      CORONARY ARTERY BYPASS GRAFT (CABG) N/A 1/1/2021    Procedure: CORONARY ARTERY BYPASS GRAFT (CABG);  Surgeon: Siva Mcnamara MD;  Location: [facility] OR;  Service: Cardiothoracic;  Laterality: N/A;    ENDOSCOPIC HARVEST OF VEIN  1/1/2021    Procedure: SURGICAL PROCUREMENT, VEIN, ENDOSCOPIC;  Surgeon: Siva Mcnamara MD;  Location: [facility] OR;  Service: Cardiothoracic;;    left wrist surgery       Family History   Problem Relation Age of Onset    Cancer Mother     Hypertension  Mother     Heart disease Father      Social History     Tobacco Use    Smoking status: Former Smoker     Packs/day: 1.00     Years: 25.00     Pack years: 25.00     Types: Cigarettes    Smokeless tobacco: Never Used    Tobacco comment: Ex smoker 40 years   Substance Use Topics    Alcohol use: Yes     Alcohol/week: 3.0 standard drinks     Types: 3 Cans of beer per week    Drug use: No     Review of Systems   Constitutional: Negative.    HENT: Negative.    Eyes: Negative.    Respiratory: Negative.    Cardiovascular: Negative.    Gastrointestinal: Negative.    Endocrine: Negative.    Genitourinary: Negative.    Skin: Negative.    Allergic/Immunologic: Negative.    Neurological: Positive for syncope.   Hematological: Negative.    Psychiatric/Behavioral: Negative.    All other systems reviewed and are negative.      Physical Exam     Initial Vitals   BP Pulse Resp Temp SpO2   07/25/22 1421 07/25/22 1421 07/25/22 1421 07/25/22 1441 07/25/22 1421   106/61 64 16 97.9 °F (36.6 °C) 100 %      MAP       --                Physical Exam    Nursing note and vitals reviewed.  Constitutional: He appears well-developed and well-nourished.   HENT:   Head: Normocephalic and atraumatic.   Nose: Nose normal.   Eyes: Conjunctivae and EOM are normal.   Neck: No tracheal deviation present.   Normal range of motion.  Cardiovascular: Normal rate, regular rhythm, normal heart sounds and intact distal pulses. Exam reveals no friction rub.    No murmur heard.  Pulmonary/Chest: Breath sounds normal. No respiratory distress. He has no wheezes. He has no rales.   Abdominal: Abdomen is soft. He exhibits no distension. There is no abdominal tenderness. There is no guarding.   Musculoskeletal:         General: Normal range of motion.      Cervical back: Normal range of motion.     Neurological: He is alert and oriented to person, place, and time. He has normal strength. No cranial nerve deficit or sensory deficit. GCS score is 15. GCS eye  subscore is 4. GCS verbal subscore is 5. GCS motor subscore is 6.   Skin: Skin is warm and dry. Capillary refill takes less than 2 seconds.   Psychiatric: He has a normal mood and affect. Thought content normal.         ED Course   Procedures  Labs Reviewed   B-TYPE NATRIURETIC PEPTIDE - Abnormal; Notable for the following components:       Result Value     (*)     All other components within normal limits   CBC W/ AUTO DIFFERENTIAL - Abnormal; Notable for the following components:    RBC 3.92 (*)     Hemoglobin 13.3 (*)      (*)     MCH 33.9 (*)     Immature Granulocytes 0.6 (*)     Immature Grans (Abs) 0.06 (*)     Gran % 73.8 (*)     Lymph % 12.7 (*)     All other components within normal limits   COMPREHENSIVE METABOLIC PANEL - Abnormal; Notable for the following components:    BUN 25 (*)     All other components within normal limits   B-TYPE NATRIURETIC PEPTIDE - Abnormal; Notable for the following components:     (*)     All other components within normal limits   CULTURE, BLOOD   CULTURE, BLOOD   TROPONIN I   URINALYSIS, REFLEX TO URINE CULTURE    Narrative:     Specimen Source->Urine   SARS-COV-2 RNA AMPLIFICATION, QUAL   DRUG SCREEN PANEL, URINE EMERGENCY   LACTIC ACID, PLASMA   LACTIC ACID, PLASMA   POCT GLUCOSE MONITORING CONTINUOUS     EKG Readings: (Independently Interpreted)   Initial Reading: No STEMI. Rhythm: Normal Sinus Rhythm. Ectopy: No Ectopy. Conduction: RBBB.       Imaging Results          CT Head Without Contrast (Final result)  Result time 07/25/22 15:43:32    Final result by Scooter Booth MD (07/25/22 15:43:32)                 Narrative:    CMS MANDATED QUALITY DATA-CT RADIATION DOSE-436  All CT scans at this facility dose modulation, iterative reconstruction, and or weight-based dosing when appropriate to reduce radiation dose to as low as reasonably achievable.    HISTORY: Syncope, recurrent    FINDINGS: Comparison to head CT of 04/20/2021. There is no acute  intracranial hemorrhage, with no mass effect or abnormal extra-axial fluid. Mild scattered areas of nonspecific hypoattenuation involve the subcortical and deep periventricular white matter, with gray-white differentiation maintained.    There is stable moderate to marked generalized prominence of the cortical sulci and ventricles. The cerebellum and brainstem are unremarkable. There is mild paranasal sinus mucosal thickening, with small left maxillary sinus air-fluid level. There are dense carotid siphon vascular calcifications. The mastoid air cells are clear.    IMPRESSION:  1. No acute intracranial hemorrhage, mass effect, or loss of gray-white differentiation.  2. Stable chronic microvascular ischemic changes and generalized cerebral atrophy.  3. Paranasal sinus disease.    Electronically signed by:  Scooter Booth MD  7/25/2022 3:43 PM CDT Workstation: [id number]                             X-Ray Chest 1 View (Final result)  Result time 07/25/22 15:39:22    Final result by Shay Jorgensen IV, MD (07/25/22 15:39:22)                 Narrative:    Chest, single view    HISTORY: Shortness of breath.    Comparison 4/20/2021.    There are chronic parenchymal opacities within the upper lung zones bilaterally. There has been a progression of bilateral mid/upper lung zone pulmonary opacities in the interval, greater on the right suggestive of superimposed acute airspace disease. Correlate with evidence of pneumonia. The lower lung zones remain appropriately aerated. There are no significant effusions.    The heart size is within normal limits. Surgical changes are present in the sternum. There is no evidence of acute pulmonary vascular engorgement.  There is arteriosclerotic calcification within the aortic arch.    IMPRESSION:    Progression of bilateral mid/upper lung zone airspace opacities and/or volume loss. Correlate with any clinical evidence of acute air space disease/pneumonia. Short-term radiographic follow-up is  necessary.    Electronically signed by:  Shay Jorgensen MD  7/25/2022 3:39 PM CDT Workstation: [id number]                               Medications   levoFLOXacin 750 mg/150 mL IVPB 750 mg (has no administration in time range)   lactated ringers infusion (has no administration in time range)   aspirin tablet 325 mg (325 mg Oral Given 7/25/22 1539)     Medical Decision Making:   Differential Diagnosis:   82-year-old male presented emergency department after a syncopal episode which was brief.  Patient currently is back at baseline.  Given patient's presentation and had abnormal chest x-ray finding will also treated with broad-spectrum antibiotic for possible pulmonary infection.  Screening labs and workup reviewed.  Patient did have improvement of symptoms.  Hospital Medicine consulted for evaluation for admission and further management given 82-year-old gentleman with syncopal episode.  Clinical Tests:   Lab Tests: Reviewed  Radiological Study: Reviewed  Medical Tests: Reviewed                      Clinical Impression:   Final diagnoses:  [R41.89] Unresponsive  [R55] Syncope, unspecified syncope type (Primary)          ED Disposition Condition    Admit               Prerna Schofield MD  07/25/22 [id number]

## 2022-07-25 NOTE — ED TRIAGE NOTES
Admit per [facility]. 83yo male who was sitting.He told family that he did not feel well. Lips turned blue and he would not respond x 1 minute. Currently he is awake alert and without complaint. He does have a baseline confusion per family

## 2022-07-25 NOTE — H&P
[facility] Medicine History & Physical Examination   Patient Name: Song Lopez Jr.  MRN: [id number]  Patient Class: OP- Observation   Admission Date: 7/25/2022  1:38 PM  Length of Stay: 0  Attending Physician: Dr. Ramirez  Primary Care Provider: Jake Kovacs MD  Face-to-Face encounter date: 07/25/2022  Code Status: full code  Chief Complaint: Loss of Consciousness (Slumped over. Lips turned blue. Unresponsive x 1 minute.)          Patient information was obtained from patient, past medical records and ER records.   HISTORY OF PRESENT ILLNESS:   History was obtained from the family present at the bedside and ER physician Sign-out. Patient presents to the ED via EMS for AMS. The family at the bedside reports the patient was sitting at the table eating when he suddenly became confused. He stated he did not feel well and he acted like he did not know where he was. His face became pale and he would not answer their questions or respond to them verbally. They report the patient began to fidget with his clothes and removed his eye glassess on and off multiple times. The wife and daughter are adamant that the patient never passed out or had loss of consciousness. They deny any blank staring or unresponsiveness. They deny any tremors or jerking of the extremities. No facial asymetry or focal weakness.  No reported chest pain or SOB.     Patient denies fever, chills, chest pain, nausea, vomiting, abdominal pain, dysuria, hematuria, diarrhea, melena, rectal bleeding, numbness, tingling or LOC. He does have wet sounding cough that has been present for a few weeks. He was treated for URI earlier this month and was prescribed a Z-pack. He was tested for Covid twice during that time and was negative.      In the ED patient is afebrile. VSS. Lab work is unremarkable. UA with no evidence of infectious process. CXR shows progression of bilateral mid/upper lung zone airspace opacities and or volume loss.  The daughter does report the patient has a chronic lung 'problem' that the doctors have been watching for years.     REVIEW OF SYSTEMS:   10 Point Review of System was performed and was found to be negative except for that mentioned already in the HPI above.     PAST MEDICAL HISTORY:     Past Medical History:   Diagnosis Date    Chronic gout     Coronary artery disease     Hypertension     TERI (obstructive sleep apnea)     Polycythemia     Squamous cell carcinoma     Thyroid disease        PAST SURGICAL HISTORY:     Past Surgical History:   Procedure Laterality Date    ANGIOGRAM, CORONARY, WITH LEFT HEART CATHETERIZATION N/A 12/31/2020    Procedure: Angiogram, Coronary, with Left Heart Cath;  Surgeon: Jaz May MD;  Location: [facility] CATH/EP LAB;  Service: Cardiology;  Laterality: N/A;    CAROTID ARTERY ANGIOPLASTY  right    carotid endarderectomy      CATARACT EXTRACTION, BILATERAL      CATARACT EXTRACTION, BILATERAL      CORONARY ARTERY BYPASS GRAFT (CABG) N/A 1/1/2021    Procedure: CORONARY ARTERY BYPASS GRAFT (CABG);  Surgeon: iSva Mcnamara MD;  Location: [facility] OR;  Service: Cardiothoracic;  Laterality: N/A;    ENDOSCOPIC HARVEST OF VEIN  1/1/2021    Procedure: SURGICAL PROCUREMENT, VEIN, ENDOSCOPIC;  Surgeon: Siva Mcnamara MD;  Location: [facility] OR;  Service: Cardiothoracic;;    left wrist surgery         ALLERGIES:   Doxycycline and Penicillins    FAMILY HISTORY:     Family History   Problem Relation Age of Onset    Cancer Mother     Hypertension Mother     Heart disease Father        SOCIAL HISTORY:     Social History     Tobacco Use    Smoking status: Former Smoker     Packs/day: 1.00     Years: 25.00     Pack years: 25.00     Types: Cigarettes    Smokeless tobacco: Never Used    Tobacco comment: Ex smoker 40 years   Substance Use Topics    Alcohol use: Yes     Alcohol/week: 3.0 standard drinks     Types: 3 Cans of beer per week        Social History     Substance and Sexual  Activity   Sexual Activity Not Currently        HOME MEDICATIONS:     Prior to Admission medications    Medication Sig Start Date End Date Taking? Authorizing Provider   allopurinoL (ZYLOPRIM) 100 MG tablet Take 1 tablet (100 mg total) by mouth once daily. 4/4/22  Yes Loretta Monroe NP   aspirin (ECOTRIN) 81 MG EC tablet Take 81 mg by mouth every evening.    Yes Historical Provider   brimonidine 0.2% (ALPHAGAN) 0.2 % Drop Place 1 drop into both eyes 3 (three) times daily.  8/6/20  Yes Historical Provider   cyanocobalamin 500 MCG tablet Take 500 mcg by mouth once daily.   Yes Historical Provider   donepeziL (ARICEPT) 10 MG tablet Take 1 tablet (10 mg total) by mouth every evening. 6/3/22 11/30/22 Yes Jake Kovacs MD   hydroxyurea (HYDREA) 500 mg Cap Take 1 po daily..  Patient taking differently: Take 500 mg by mouth once daily Take 1 po daily.. 4/6/22  Yes SNOW Clark MD   latanoprost 0.005 % ophthalmic solution Place 1 drop into both eyes every evening.   Yes Historical Provider   levothyroxine (SYNTHROID) 88 MCG tablet Take 1 tablet (88 mcg total) by mouth before breakfast. 3/28/22  Yes Jake Kovacs MD   megestroL (MEGACE) 40 MG Tab Take 2 tablets p.o. b.i.d. for appetite.  Patient taking differently: Take 80 mg by mouth 2 (two) times daily Take 2 tablets p.o. b.i.d. for appetite. 7/22/22  Yes Igor Rios PA-C   memantine (NAMENDA) 5 MG Tab Take 1 tablet daily for 4 weeks then 1 tablet twice a day thereafter 5/5/22  Yes Jake Kovacs MD   metoprolol tartrate (LOPRESSOR) 25 MG tablet Take 1 tablet (25 mg total) by mouth once daily. 1/28/22 1/28/23 Yes Jake Kovacs MD   omeprazole (PRILOSEC) 20 MG capsule Take 1 capsule (20 mg total) by mouth once daily. 1/28/22  Yes Jake Kovacs MD   rosuvastatin (CRESTOR) 10 MG tablet Take 1 tablet (10 mg total) by mouth once daily. 8/9/21 8/9/22 Yes Jake Kovacs MD   ARIPiprazole (ABILIFY) 5 MG Tab Take 1 tablet (5 mg total) by mouth once  "daily. 4/23/21 4/23/22  Jake Kovacs MD   diphenoxylate-atropine 2.5-0.025 mg (LOMOTIL) 2.5-0.025 mg per tablet Take 1 tablet by mouth 4 (four) times daily as needed for Diarrhea.    Historical Provider   docusate sodium (COLACE) 100 MG capsule Take 1 capsule (100 mg total) by mouth 2 (two) times daily. 1/11/21   Naveen Younger MD   furosemide (LASIX) 20 MG tablet Take 1 tablet (20 mg total) by mouth once daily. 1/28/22 1/28/23  Jake Kovacs MD   oxybutynin (DITROPAN) 5 MG Tab One tablet with lunch and supper for bladder problem 7/22/22   Igor Rios PA-C   acyclovir (ZOVIRAX) 800 MG Tab TAKE 1 TABLET BY MOUTH 4 TIMES DAILY 2/13/21 7/25/22  Historical Provider   amoxicillin (AMOXIL) 500 MG capsule Take 1 capsule (500 mg total) by mouth 3 (three) times daily. 4/22/22 7/25/22  Jake Kovacs MD   traMADoL (ULTRAM) 50 mg tablet Take 1 tablet (50 mg total) by mouth every 6 (six) hours as needed for Pain. 3/29/21 7/25/22  Jake Kovacs MD         PHYSICAL EXAM:   BP (!) 154/74 (Patient Position: Standing)   Pulse 78   Temp 97.9 °F (36.6 °C)   Resp 16   Ht 5' 9" (1.753 m)   Wt 59 kg (130 lb)   SpO2 97%   BMI 19.20 kg/m²   Vitals Reviewed  General appearance: Well-developed, thin, elderly appearing White male   Skin: No Rash. Warm, dry.   Neuro: AAOx2 to self and place. Follows commands. Motor and sensory exams grossly intact. Good tone. Generalized weakness.  HENT: Atraumatic head. Moist mucous membranes of oral cavity.  Eyes: Normal extraocular movements. PERRLA  Neck: Supple. No evidence of lymphadenopathy. No thyroidomegaly.  Lungs: Clear to auscultation bilaterally. No wheezing present.   Heart: Regular rate and rhythm. S1 and S2 present with no murmurs/gallop/rub. No pedal edema. No JVD present.   Abdomen: Soft, non-distended, non-tender. No rebound tenderness/guarding. No masses or organomegaly. Bowel sounds are normal. Bladder is not palpable.   Extremities: No cyanosis, clubbing. " Capillary refill less than 2 seconds.   Psych/mental status: Alert and oriented. Cooperative. Responds appropriately to questions.   EMERGENCY DEPARTMENT LABS AND IMAGING:     Labs Reviewed   B-TYPE NATRIURETIC PEPTIDE - Abnormal; Notable for the following components:       Result Value     (*)     All other components within normal limits   CBC W/ AUTO DIFFERENTIAL - Abnormal; Notable for the following components:    RBC 3.92 (*)     Hemoglobin 13.3 (*)      (*)     MCH 33.9 (*)     Immature Granulocytes 0.6 (*)     Immature Grans (Abs) 0.06 (*)     Gran % 73.8 (*)     Lymph % 12.7 (*)     All other components within normal limits   COMPREHENSIVE METABOLIC PANEL - Abnormal; Notable for the following components:    BUN 25 (*)     All other components within normal limits   B-TYPE NATRIURETIC PEPTIDE - Abnormal; Notable for the following components:     (*)     All other components within normal limits   CULTURE, BLOOD   CULTURE, BLOOD   DRUG SCREEN PANEL, URINE EMERGENCY    Narrative:     Specimen Source->Urine   TROPONIN I   URINALYSIS, REFLEX TO URINE CULTURE    Narrative:     Specimen Source->Urine   SARS-COV-2 RNA AMPLIFICATION, QUAL   TSH   TSH   LACTIC ACID, PLASMA   LACTIC ACID, PLASMA   PROCALCITONIN   POCT GLUCOSE   POCT GLUCOSE MONITORING CONTINUOUS       X-Ray Chest 1 View    Result Date: 7/25/2022  Chest, single view HISTORY: Shortness of breath. Comparison 4/20/2021. There are chronic parenchymal opacities within the upper lung zones bilaterally. There has been a progression of bilateral mid/upper lung zone pulmonary opacities in the interval, greater on the right suggestive of superimposed acute airspace disease. Correlate with evidence of pneumonia. The lower lung zones remain appropriately aerated. There are no significant effusions. The heart size is within normal limits. Surgical changes are present in the sternum. There is no evidence of acute pulmonary vascular engorgement.   There is arteriosclerotic calcification within the aortic arch. IMPRESSION: Progression of bilateral mid/upper lung zone airspace opacities and/or volume loss. Correlate with any clinical evidence of acute air space disease/pneumonia. Short-term radiographic follow-up is necessary. Electronically signed by:  Shay Jorgensen MD  7/25/2022 3:39 PM CDT Workstation: [id number]    CT Head Without Contrast    Result Date: 7/25/2022  CMS MANDATED QUALITY DATA-CT RADIATION DOSE-[id number] All CT scans at this facility dose modulation, iterative reconstruction, and or weight-based dosing when appropriate to reduce radiation dose to as low as reasonably achievable. HISTORY: Syncope, recurrent FINDINGS: Comparison to head CT of 04/20/2021. There is no acute intracranial hemorrhage, with no mass effect or abnormal extra-axial fluid. Mild scattered areas of nonspecific hypoattenuation involve the subcortical and deep periventricular white matter, with gray-white differentiation maintained. There is stable moderate to marked generalized prominence of the cortical sulci and ventricles. The cerebellum and brainstem are unremarkable. There is mild paranasal sinus mucosal thickening, with small left maxillary sinus air-fluid level. There are dense carotid siphon vascular calcifications. The mastoid air cells are clear. IMPRESSION: 1. No acute intracranial hemorrhage, mass effect, or loss of gray-white differentiation. 2. Stable chronic microvascular ischemic changes and generalized cerebral atrophy. 3. Paranasal sinus disease. Electronically signed by:  Scooter Booth MD  7/25/2022 3:43 PM CDT Workstation: [id number]  ASSESSMENT & PLAN:   Song Lopez Jr. is a 82 y.o. male admitted for    Active Hospital Problems    Diagnosis  POA    *TIA (transient ischemic attack) [G45.9]  Unknown    Chronic congestive heart failure [I50.9]  Unknown    AMS (altered mental status) [R41.82]  Unknown    S/P CABG (coronary artery bypass graft) [Z95.1]  Not  Applicable     Chronic    TERI on CPAP [G47.33, Z99.89]  Not Applicable     Chronic    Polycythemia vera [D45]  Yes     Chronic    Hypothyroidism [E03.9]  Yes     Chronic    Essential hypertension [I10]  Yes     Chronic    Hyperlipidemia [E78.5]  Yes     Chronic      Resolved Hospital Problems   No resolved problems to display.        Plan  Admit to Cardiology  Patient with brief period of AMS;   Neuro checks  Trend trop  2-d echo with bubble  CT head with no acute intracranial process  MRI brain  US bilateral carotids  Continue ASA/Statin  PT/OT/ST consult  Neurology consult  Check tsh/lipid panel  Continue home medications as prescribed for chronic conditions   IV Levaquin   Blood cultures pending  Sputum culture with gram stain  Check procalcitonin      Diet: Cardiac    DVT Prophylaxis: Mechanical DVT prophylaxis. Encourage ambulation and OOB as tolerated.     Discharge Planning and Disposition:  Patient will be discharged in 1-2 days  ________________________________________________________________________________    Face-to-Face encounter date: 07/25/2022  Encounter included review of the medical records, interviewing and examining the patient face-to-face, discussion with family and other health care providers including emergency medicine physician, admission orders, interpreting lab/test results and formulating a plan of care.   Medical Decision Making during this encounter was  [_] Low Complexity  [_] Moderate Complexity  [x] High Complexity  _________________________________________________________________________________    INPATIENT LIST OF MEDICATIONS     Current Facility-Administered Medications:     lactated ringers infusion, 1,000 mL, Intravenous, ED 1 Time, Prerna Schofield MD    levoFLOXacin 750 mg/150 mL IVPB 750 mg, 750 mg, Intravenous, ED 1 Time, Prerna Schofield MD    Current Outpatient Medications:     allopurinoL (ZYLOPRIM) 100 MG tablet, Take 1 tablet (100 mg total) by mouth once daily., Disp:  90 tablet, Rfl: 1    aspirin (ECOTRIN) 81 MG EC tablet, Take 81 mg by mouth every evening. , Disp: , Rfl:     brimonidine 0.2% (ALPHAGAN) 0.2 % Drop, Place 1 drop into both eyes 3 (three) times daily. , Disp: , Rfl:     cyanocobalamin 500 MCG tablet, Take 500 mcg by mouth once daily., Disp: , Rfl:     donepeziL (ARICEPT) 10 MG tablet, Take 1 tablet (10 mg total) by mouth every evening., Disp: 90 tablet, Rfl: 1    hydroxyurea (HYDREA) 500 mg Cap, Take 1 po daily.. (Patient taking differently: Take 500 mg by mouth once daily Take 1 po daily..), Disp: 30 capsule, Rfl: 6    latanoprost 0.005 % ophthalmic solution, Place 1 drop into both eyes every evening., Disp: , Rfl:     levothyroxine (SYNTHROID) 88 MCG tablet, Take 1 tablet (88 mcg total) by mouth before breakfast., Disp: 90 tablet, Rfl: 1    megestroL (MEGACE) 40 MG Tab, Take 2 tablets p.o. b.i.d. for appetite. (Patient taking differently: Take 80 mg by mouth 2 (two) times daily Take 2 tablets p.o. b.i.d. for appetite.), Disp: 120 tablet, Rfl: 1    memantine (NAMENDA) 5 MG Tab, Take 1 tablet daily for 4 weeks then 1 tablet twice a day thereafter, Disp: 60 tablet, Rfl: 5    metoprolol tartrate (LOPRESSOR) 25 MG tablet, Take 1 tablet (25 mg total) by mouth once daily., Disp: 30 tablet, Rfl: 5    omeprazole (PRILOSEC) 20 MG capsule, Take 1 capsule (20 mg total) by mouth once daily., Disp: 90 capsule, Rfl: 3    rosuvastatin (CRESTOR) 10 MG tablet, Take 1 tablet (10 mg total) by mouth once daily., Disp: 90 tablet, Rfl: 3    ARIPiprazole (ABILIFY) 5 MG Tab, Take 1 tablet (5 mg total) by mouth once daily., Disp: 30 tablet, Rfl: 3    diphenoxylate-atropine 2.5-0.025 mg (LOMOTIL) 2.5-0.025 mg per tablet, Take 1 tablet by mouth 4 (four) times daily as needed for Diarrhea., Disp: , Rfl:     docusate sodium (COLACE) 100 MG capsule, Take 1 capsule (100 mg total) by mouth 2 (two) times daily., Disp: 60 capsule, Rfl: 0    furosemide (LASIX) 20 MG tablet, Take 1  tablet (20 mg total) by mouth once daily., Disp: 30 tablet, Rfl: 5    oxybutynin (DITROPAN) 5 MG Tab, One tablet with lunch and supper for bladder problem, Disp: 60 tablet, Rfl: 2      Scheduled Meds:   lactated ringers  1,000 mL Intravenous ED 1 Time    levoFLOXacin  750 mg Intravenous ED 1 Time     Continuous Infusions:  PRN Meds:.      Vivien Gandara  [facility] Hospitalist  07/25/2022

## 2022-07-26 ENCOUNTER — TELEPHONE (OUTPATIENT)
Dept: FAMILY MEDICINE | Facility: CLINIC | Age: 82
End: 2022-07-26

## 2022-07-26 VITALS
RESPIRATION RATE: 21 BRPM | OXYGEN SATURATION: 98 % | WEIGHT: 130.06 LBS | BODY MASS INDEX: 19.26 KG/M2 | HEIGHT: 69 IN | HEART RATE: 81 BPM | DIASTOLIC BLOOD PRESSURE: 88 MMHG | TEMPERATURE: 98 F | SYSTOLIC BLOOD PRESSURE: 148 MMHG

## 2022-07-26 LAB
ANION GAP SERPL CALC-SCNC: 13 MMOL/L (ref 8–16)
BASOPHILS # BLD AUTO: 0.1 K/UL (ref 0–0.2)
BASOPHILS NFR BLD: 1.4 % (ref 0–1.9)
BUN SERPL-MCNC: 24 MG/DL (ref 8–23)
CALCIUM SERPL-MCNC: 9.3 MG/DL (ref 8.7–10.5)
CHLORIDE SERPL-SCNC: 102 MMOL/L (ref 95–110)
CHOLEST SERPL-MCNC: 96 MG/DL (ref 120–199)
CHOLEST/HDLC SERPL: 3.6 {RATIO} (ref 2–5)
CO2 SERPL-SCNC: 25 MMOL/L (ref 23–29)
CREAT SERPL-MCNC: 0.9 MG/DL (ref 0.5–1.4)
DIFFERENTIAL METHOD: ABNORMAL
EOSINOPHIL # BLD AUTO: 0.1 K/UL (ref 0–0.5)
EOSINOPHIL NFR BLD: 1.9 % (ref 0–8)
ERYTHROCYTE [DISTWIDTH] IN BLOOD BY AUTOMATED COUNT: 13.9 % (ref 11.5–14.5)
EST. GFR  (AFRICAN AMERICAN): >60 ML/MIN/1.73 M^2
EST. GFR  (NON AFRICAN AMERICAN): >60 ML/MIN/1.73 M^2
ESTIMATED AVG GLUCOSE: 100 MG/DL (ref 68–131)
GLUCOSE SERPL-MCNC: 91 MG/DL (ref 70–110)
HBA1C MFR BLD: 5.1 % (ref 4.5–6.2)
HCT VFR BLD AUTO: 37.7 % (ref 40–54)
HDLC SERPL-MCNC: 27 MG/DL (ref 40–75)
HDLC SERPL: 28.1 % (ref 20–50)
HGB BLD-MCNC: 12.6 G/DL (ref 14–18)
IMM GRANULOCYTES # BLD AUTO: 0.04 K/UL (ref 0–0.04)
IMM GRANULOCYTES NFR BLD AUTO: 0.5 % (ref 0–0.5)
LDLC SERPL CALC-MCNC: 62.8 MG/DL (ref 63–159)
LYMPHOCYTES # BLD AUTO: 1.1 K/UL (ref 1–4.8)
LYMPHOCYTES NFR BLD: 15 % (ref 18–48)
MAGNESIUM SERPL-MCNC: 1.8 MG/DL (ref 1.6–2.6)
MCH RBC QN AUTO: 34.1 PG (ref 27–31)
MCHC RBC AUTO-ENTMCNC: 33.4 G/DL (ref 32–36)
MCV RBC AUTO: 102 FL (ref 82–98)
MONOCYTES # BLD AUTO: 0.7 K/UL (ref 0.3–1)
MONOCYTES NFR BLD: 9.3 % (ref 4–15)
NEUTROPHILS # BLD AUTO: 5.2 K/UL (ref 1.8–7.7)
NEUTROPHILS NFR BLD: 71.9 % (ref 38–73)
NONHDLC SERPL-MCNC: 69 MG/DL
NRBC BLD-RTO: 0 /100 WBC
PLATELET # BLD AUTO: 301 K/UL (ref 150–450)
PMV BLD AUTO: 10.8 FL (ref 9.2–12.9)
POTASSIUM SERPL-SCNC: 4.1 MMOL/L (ref 3.5–5.1)
RBC # BLD AUTO: 3.7 M/UL (ref 4.6–6.2)
SODIUM SERPL-SCNC: 140 MMOL/L (ref 136–145)
TRIGL SERPL-MCNC: 31 MG/DL (ref 30–150)
TROPONIN I SERPL DL<=0.01 NG/ML-MCNC: <0.03 NG/ML
WBC # BLD AUTO: 7.29 K/UL (ref 3.9–12.7)

## 2022-07-26 PROCEDURE — 83036 HEMOGLOBIN GLYCOSYLATED A1C: CPT | Performed by: NURSE PRACTITIONER

## 2022-07-26 PROCEDURE — 36415 COLL VENOUS BLD VENIPUNCTURE: CPT | Performed by: NURSE PRACTITIONER

## 2022-07-26 PROCEDURE — 25000003 PHARM REV CODE 250: Performed by: NURSE PRACTITIONER

## 2022-07-26 PROCEDURE — 80061 LIPID PANEL: CPT | Performed by: NURSE PRACTITIONER

## 2022-07-26 PROCEDURE — 83735 ASSAY OF MAGNESIUM: CPT | Performed by: NURSE PRACTITIONER

## 2022-07-26 PROCEDURE — G0378 HOSPITAL OBSERVATION PER HR: HCPCS

## 2022-07-26 PROCEDURE — 25000003 PHARM REV CODE 250: Performed by: INTERNAL MEDICINE

## 2022-07-26 PROCEDURE — 85025 COMPLETE CBC W/AUTO DIFF WBC: CPT | Performed by: NURSE PRACTITIONER

## 2022-07-26 PROCEDURE — 80048 BASIC METABOLIC PNL TOTAL CA: CPT | Performed by: NURSE PRACTITIONER

## 2022-07-26 PROCEDURE — 84484 ASSAY OF TROPONIN QUANT: CPT | Performed by: NURSE PRACTITIONER

## 2022-07-26 RX ORDER — LABETALOL HYDROCHLORIDE 5 MG/ML
10 INJECTION, SOLUTION INTRAVENOUS EVERY 6 HOURS PRN
Status: DISCONTINUED | OUTPATIENT
Start: 2022-07-26 | End: 2022-07-26 | Stop reason: HOSPADM

## 2022-07-26 RX ORDER — HYDRALAZINE HYDROCHLORIDE 20 MG/ML
10 INJECTION INTRAMUSCULAR; INTRAVENOUS EVERY 8 HOURS PRN
Status: DISCONTINUED | OUTPATIENT
Start: 2022-07-26 | End: 2022-07-26 | Stop reason: HOSPADM

## 2022-07-26 RX ORDER — LANOLIN ALCOHOL/MO/W.PET/CERES
1000 CREAM (GRAM) TOPICAL DAILY
Status: DISCONTINUED | OUTPATIENT
Start: 2022-07-26 | End: 2022-07-26 | Stop reason: HOSPADM

## 2022-07-26 RX ORDER — LEVOFLOXACIN 500 MG/1
500 TABLET, FILM COATED ORAL DAILY
Qty: 4 TABLET | Refills: 0 | Status: SHIPPED | OUTPATIENT
Start: 2022-07-26 | End: 2023-02-22

## 2022-07-26 RX ADMIN — PANTOPRAZOLE SODIUM 40 MG: 40 TABLET, DELAYED RELEASE ORAL at 06:07

## 2022-07-26 RX ADMIN — ALLOPURINOL 100 MG: 100 TABLET ORAL at 08:07

## 2022-07-26 RX ADMIN — MEMANTINE 5 MG: 5 TABLET ORAL at 08:07

## 2022-07-26 RX ADMIN — OXYBUTYNIN CHLORIDE 5 MG: 5 TABLET ORAL at 08:07

## 2022-07-26 RX ADMIN — ATORVASTATIN CALCIUM 20 MG: 20 TABLET, FILM COATED ORAL at 08:07

## 2022-07-26 RX ADMIN — FUROSEMIDE 20 MG: 20 TABLET ORAL at 08:07

## 2022-07-26 RX ADMIN — CYANOCOBALAMIN TAB 1000 MCG 1000 MCG: 1000 TAB at 08:07

## 2022-07-26 RX ADMIN — LEVOTHYROXINE SODIUM 88 MCG: 88 TABLET ORAL at 06:07

## 2022-07-26 RX ADMIN — METOPROLOL TARTRATE 25 MG: 25 TABLET, FILM COATED ORAL at 08:07

## 2022-07-26 RX ADMIN — DOCUSATE SODIUM 100 MG: 100 CAPSULE, LIQUID FILLED ORAL at 08:07

## 2022-07-26 RX ADMIN — BRIMONIDINE TARTRATE 1 DROP: 1.5 SOLUTION/ DROPS OPHTHALMIC at 08:07

## 2022-07-26 RX ADMIN — HYDROXYUREA 500 MG: 500 CAPSULE ORAL at 08:07

## 2022-07-26 NOTE — PLAN OF CARE
[facility]  Initial Discharge Assessment       Primary Care Provider: Jake Kovacs MD    Admission Diagnosis: Syncope, unspecified syncope type [R55]    Admission Date: 7/25/2022  Expected Discharge Date:     Discharge Barriers Identified: (P) None    Assessment completed at bedside with Pt, Pt's wife, Em Lopez, [phone], and his adult daughter, Elisabeth. Permission was given to continue. Pt lives with his wife and his adult daughters alternate staying with them. Pt plans to return home at discharge. Pt uses a rolling walker at home and is not requesting any additional DME for discharge. Pt does not have a POA/AD and family is not requesting information.     Payor: [employer] MANAGED MEDICARE / Plan: [id number] / Product Type: Medicare Advantage /     Extended Emergency Contact Information  Primary Emergency Contact: Em Lopez  Address: [address] [place] [address] [place] [address] [facility]  Home Phone: [phone]  Mobile Phone: [phone]  Relation: Spouse   needed? No  Secondary Emergency Contact: Bernie Almeida  Mobile Phone: [phone]  Relation: Daughter  Preferred language: English   needed? No    Discharge Plan A: (P) Home with family  Discharge Plan B: (P) Home with family      [address] [place]  [address] [address]  [place] [address]  Phone: [phone] Fax: [phone]      Initial Assessment (most recent)       Adult Discharge Assessment - 07/26/22 1054          Discharge Assessment    Assessment Type Discharge Planning Assessment     Confirmed/corrected address, phone number and insurance Yes (P)      Confirmed Demographics Correct on Facesheet (P)      Source of Information patient;family (P)      When was your last doctors appointment? -- (P)    about 3 months ago, well visit with PCP    Does patient/caregiver understand observation status Yes (P)       Communicated CHRISTO with patient/caregiver Date not available/Unable to determine (P)      Reason For Admission Pt started incoherently responding, pale, repetitive movements (P)      Lives With spouse (P)      Facility Arrived From: home (P)      Do you expect to return to your current living situation? Yes (P)      Do you have help at home or someone to help you manage your care at home? Yes (P)      Who are your caregiver(s) and their phone number(s)? Em Lopez, [phone], spouse; adult daughters Bernie and Elisabeth (P)      Prior to hospitilization cognitive status: Not Oriented to Place;Not Oriented to Person (P)      Current cognitive status: Alert/Oriented (P)      Walking or Climbing Stairs Difficulty ambulation difficulty, requires equipment;ambulation difficulty, assistance 1 person (P)      Mobility Management family, rolling walker (P)      Dressing/Bathing Difficulty bathing difficulty, assistance 1 person (P)      Dressing/Bathing Management spouse (P)      Home Accessibility wheelchair accessible (P)      Home Layout Able to live on 1st floor (P)      Equipment Currently Used at Home walker, rolling;bath bench (P)      Readmission within 30 days? No (P)      Patient currently being followed by outpatient case management? No (P)      Do you currently have service(s) that help you manage your care at home? No (P)      Do you take prescription medications? Yes (P)      Do you have prescription coverage? Yes (P)      Coverage [employer] (P)      Do you have any problems affording any of your prescribed medications? No (P)      Is the patient taking medications as prescribed? yes (P)      Who is going to help you get home at discharge? Em Lopez, [phone], spouse, adult daughters (P)      How do you get to doctors appointments? family or friend will provide (P)      Are you on dialysis? No (P)      Do you take coumadin? No (P)      Discharge Plan A Home with family (P)      Discharge Plan B  Home with family (P)      DME Needed Upon Discharge  none (P)      Discharge Plan discussed with: Patient;Adult children;Spouse/sig other (P)      Name(s) and Number(s) Em Lopez, [phone], spouse (P)      Discharge Barriers Identified None (P)

## 2022-07-26 NOTE — HOSPITAL COURSE
HISTORY OF PRESENT ILLNESS:   History was obtained from the family present at the bedside and ER physician Sign-out. Patient presents to the ED via EMS for AMS. The family at the bedside reports the patient was sitting at the table eating when he suddenly became confused. He stated he did not feel well and he acted like he did not know where he was. His face became pale and he would not answer their questions or respond to them verbally. They report the patient began to fidget with his clothes and removed his eye glassess on and off multiple times. The wife and daughter are adamant that the patient never passed out or had loss of consciousness. They deny any blank staring or unresponsiveness. They deny any tremors or jerking of the extremities. No facial asymetry or focal weakness.  No reported chest pain or SOB.      Patient denies fever, chills, chest pain, nausea, vomiting, abdominal pain, dysuria, hematuria, diarrhea, melena, rectal bleeding, numbness, tingling or LOC. He does have wet sounding cough that has been present for a few weeks. He was treated for URI earlier this month and was prescribed a Z-pack. He was tested for Covid twice during that time and was negative.      In the ED patient is afebrile. VSS. Lab work is unremarkable. UA with no evidence of infectious process. CXR shows progression of bilateral mid/upper lung zone airspace opacities and or volume loss. The daughter does report the patient has a chronic lung 'problem' that the doctors have been watching for years.     Hospital course  Patient was admitted with altered mental status possible TIA.  But patient appear to have  dementia on  2 medications and appeared to be having some upper respiratory tract infections recently.  All neurological workup is negative with negative MRI brain.  Later he was awake alert oriented and no focal neurological deficit.  He refused to participate in PT.  No signs of infections appreciated except some airspace  opacity in chest x-ray which Levaquin was started since admission.  Patient has no fever and no leukocytosis and patient was discharged with Levaquin course and follow with outpatient.  He walked and no orthostasis prior to discharge

## 2022-07-26 NOTE — PLAN OF CARE
Problem: Adult Inpatient Plan of Care  Goal: Plan of Care Review  Outcome: Met  Goal: Patient-Specific Goal (Individualized)  Outcome: Met  Goal: Absence of Hospital-Acquired Illness or Injury  Outcome: Met  Goal: Optimal Comfort and Wellbeing  Outcome: Met  Goal: Readiness for Transition of Care  Outcome: Met     Problem: Skin Injury Risk Increased  Goal: Skin Health and Integrity  Outcome: Met     Problem: Adjustment to Illness (Stroke, Ischemic/Transient Ischemic Attack)  Goal: Optimal Coping  Outcome: Met     Problem: Bowel Elimination Impaired (Stroke, Ischemic/Transient Ischemic Attack)  Goal: Effective Bowel Elimination  Outcome: Met     Problem: Cerebral Tissue Perfusion (Stroke, Ischemic/Transient Ischemic Attack)  Goal: Optimal Cerebral Tissue Perfusion  Outcome: Met     Problem: Cognitive Impairment (Stroke, Ischemic/Transient Ischemic Attack)  Goal: Optimal Cognitive Function  Outcome: Met     Problem: Communication Impairment (Stroke, Ischemic/Transient Ischemic Attack)  Goal: Improved Communication Skills  Outcome: Met     Problem: Functional Ability Impaired (Stroke, Ischemic/Transient Ischemic Attack)  Goal: Optimal Functional Ability  Outcome: Met     Problem: Respiratory Compromise (Stroke, Ischemic/Transient Ischemic Attack)  Goal: Effective Oxygenation and Ventilation  Outcome: Met     Problem: Sensorimotor Impairment (Stroke, Ischemic/Transient Ischemic Attack)  Goal: Improved Sensorimotor Function  Outcome: Met     Problem: Swallowing Impairment (Stroke, Ischemic/Transient Ischemic Attack)  Goal: Optimal Eating and Swallowing without Aspiration  Outcome: Met     Problem: Urinary Elimination Impaired (Stroke, Ischemic/Transient Ischemic Attack)  Goal: Effective Urinary Elimination  Outcome: Met     Problem: Fall Injury Risk  Goal: Absence of Fall and Fall-Related Injury  Outcome: Met

## 2022-07-26 NOTE — PLAN OF CARE
07/26/22 1143   Final Note   Assessment Type Final Discharge Note   Anticipated Discharge Disposition Home   What phone number can be called within the next 1-3 days to see how you are doing after discharge? [phone]   Hospital Resources/Appts/Education Provided Provided patient/caregiver with written discharge plan information   Post-Acute Status   Coverage [employer]   Discharge Delays None known at this time     Pt is discharging home with family with no identified CM needs. SW called PCP office to set up Pt's follow up appointment, Deepa with the office advised they will call him to schedule follow up. I advised Pt's family of this and advised to follow up by Thursday if they haven't heard from the office.     Pt is clear to discharge from a CM standpoint.

## 2022-07-26 NOTE — PLAN OF CARE
07/26/22 1126   HARE Message   Medicare Outpatient and Observation Notification regarding financial responsibility Given to patient/caregiver;Explained to patient/caregiver;Signed/date by patient/caregiver   Date HARE was signed 07/26/22   Time HARE was signed 1101

## 2022-07-26 NOTE — TELEPHONE ENCOUNTER
----- Message from Bernie Almeida sent at 7/26/2022  8:13 AM CDT -----  Refill Allopurinol  # 90 [facility] on [address]. The pharmacy said he did not have anymore refills. Pt's # [phone] GH

## 2022-07-26 NOTE — TELEPHONE ENCOUNTER
----- Message from Deepa Gonzales sent at 7/26/2022 11:37 AM CDT -----  Genoveva with [facility] case management called and stated that the patient is discharged today and  need a HFU in one week please call the patient daughter (Bernie)

## 2022-07-26 NOTE — PT/OT/SLP PROGRESS
Physical Therapy      Patient Name:  Song Lopez Jr.   MRN:  [id number]    Patient not seen today secondary to Patient unwilling to participate, Other (Comment) (Pt and family member declined therapy services.). Will not follow-up at pt/fly request.

## 2022-07-26 NOTE — DISCHARGE SUMMARY
Replaced by [facility]  Discharge Summary      Patient Name: Song Lopez Jr.  MRN: [id number]  Patient Class: OP- Observation  Admission Date: 7/25/2022  Hospital Length of Stay: 0 days  Discharge Date and Time:  07/26/2022 5:35 PM  Attending Physician: No att. providers found   Discharging Provider: Arnol Medina MD  Primary Care Provider: Jake Kovacs MD      HPI:   No notes on file    * No surgery found *      Hospital Course:   HISTORY OF PRESENT ILLNESS:   History was obtained from the family present at the bedside and ER physician Sign-out. Patient presents to the ED via EMS for AMS. The family at the bedside reports the patient was sitting at the table eating when he suddenly became confused. He stated he did not feel well and he acted like he did not know where he was. His face became pale and he would not answer their questions or respond to them verbally. They report the patient began to fidget with his clothes and removed his eye glassess on and off multiple times. The wife and daughter are adamant that the patient never passed out or had loss of consciousness. They deny any blank staring or unresponsiveness. They deny any tremors or jerking of the extremities. No facial asymetry or focal weakness.  No reported chest pain or SOB.      Patient denies fever, chills, chest pain, nausea, vomiting, abdominal pain, dysuria, hematuria, diarrhea, melena, rectal bleeding, numbness, tingling or LOC. He does have wet sounding cough that has been present for a few weeks. He was treated for URI earlier this month and was prescribed a Z-pack. He was tested for Covid twice during that time and was negative.      In the ED patient is afebrile. VSS. Lab work is unremarkable. UA with no evidence of infectious process. CXR shows progression of bilateral mid/upper lung zone airspace opacities and or volume loss. The daughter does report the patient has a chronic lung 'problem' that the doctors have  been watching for years.     Hospital course  Patient was admitted with altered mental status possible TIA.  But patient appear to have  dementia on  2 medications and appeared to be having some upper respiratory tract infections recently.  All neurological workup is negative with negative MRI brain.  Later he was awake alert oriented and no focal neurological deficit.  He refused to participate in PT.  No signs of infections appreciated except some airspace opacity in chest x-ray which Levaquin was started since admission.  Patient has no fever and no leukocytosis and patient was discharged with Levaquin course and follow with outpatient.  He walked and no orthostasis prior to discharge       Goals of Care Treatment Preferences:  Code Status: Full Code      Consults:   Consults (From admission, onward)        Status Ordering Provider     IP consult to case management/social work  Once        Provider:  (Not yet assigned)    ALLYSSA Arzola          No new Assessment & Plan notes have been filed under this hospital service since the last note was generated.  Service: Hospital Medicine    Final Active Diagnoses:    Diagnosis Date Noted POA    PRINCIPAL PROBLEM:  TIA (transient ischemic attack) [G45.9] 07/25/2022 Unknown    Chronic congestive heart failure [I50.9] 07/25/2022 Unknown    AMS (altered mental status) [R41.82] 07/25/2022 Unknown    S/P CABG (coronary artery bypass graft) [Z95.1] 01/01/2021 Not Applicable     Chronic    TERI on CPAP [G47.33, Z99.89] 08/07/2017 Not Applicable     Chronic    Polycythemia vera [D45] 07/19/2017 Yes     Chronic    Hypothyroidism [E03.9] 12/01/2014 Yes     Chronic    Essential hypertension [I10] 12/01/2014 Yes     Chronic    Hyperlipidemia [E78.5] 12/01/2014 Yes     Chronic      Problems Resolved During this Admission:       Discharged Condition: good    Disposition: Home or Self Care    Follow Up:   Follow-up Information     Jake Kovacs MD. Schedule an  appointment as soon as possible for a visit in 1 week(s).    Specialties: Family Medicine, Home Health Services, Hospice Services  Why: Your PCP office will call you to schedule follow up appointment. Please call on Thursday if you haven't heard from them.  Contact information:  [address]  [address]  [facility]  [place] [address]  [phone]             Norman Mejias MD Follow up in 1 month(s).    Specialty: Neurology  Contact information:  [address]   [place] [address]  [phone]                       Patient Instructions:      Diet Cardiac     Activity as tolerated       Significant Diagnostic Studies: Labs:   BMP:   Recent Labs   Lab 07/25/22  1501 07/26/22  0142    91    140   K 3.5 4.1    102   CO2 24 25   BUN 25* 24*   CREATININE 0.8 0.9   CALCIUM 9.3 9.3   MG  --  1.8       Pending Diagnostic Studies:     None         Medications:  Reconciled Home Medications:      Medication List      START taking these medications    levoFLOXacin 500 MG tablet  Commonly known as: LEVAQUIN  Take 1 tablet (500 mg total) by mouth once daily.        CHANGE how you take these medications    hydroxyurea 500 mg Cap  Commonly known as: HYDREA  Take 1 po daily..  What changed:   · how much to take  · how to take this  · when to take this     megestroL 40 MG Tab  Commonly known as: MEGACE  Take 2 tablets p.o. b.i.d. for appetite.  What changed:   · how much to take  · how to take this  · when to take this        CONTINUE taking these medications    allopurinoL 100 MG tablet  Commonly known as: ZYLOPRIM  Take 1 tablet (100 mg total) by mouth once daily.     ARIPiprazole 5 MG Tab  Commonly known as: ABILIFY  Take 1 tablet (5 mg total) by mouth once daily.     aspirin 81 MG EC tablet  Commonly known as: ECOTRIN  Take 81 mg by mouth every evening.     brimonidine 0.2% 0.2 % Drop  Commonly known as: ALPHAGAN  Place 1 drop into both eyes 3 (three) times daily.     cyanocobalamin 500 MCG  tablet  Take 500 mcg by mouth once daily.     diphenoxylate-atropine 2.5-0.025 mg 2.5-0.025 mg per tablet  Commonly known as: LOMOTIL  Take 1 tablet by mouth 4 (four) times daily as needed for Diarrhea.     docusate sodium 100 MG capsule  Commonly known as: COLACE  Take 1 capsule (100 mg total) by mouth 2 (two) times daily.     donepeziL 10 MG tablet  Commonly known as: ARICEPT  Take 1 tablet (10 mg total) by mouth every evening.     furosemide 20 MG tablet  Commonly known as: LASIX  Take 1 tablet (20 mg total) by mouth once daily.     latanoprost 0.005 % ophthalmic solution  Place 1 drop into both eyes every evening.     levothyroxine 88 MCG tablet  Commonly known as: SYNTHROID  Take 1 tablet (88 mcg total) by mouth before breakfast.     memantine 5 MG Tab  Commonly known as: NAMENDA  Take 1 tablet daily for 4 weeks then 1 tablet twice a day thereafter     metoprolol tartrate 25 MG tablet  Commonly known as: LOPRESSOR  Take 1 tablet (25 mg total) by mouth once daily.     omeprazole 20 MG capsule  Commonly known as: PRILOSEC  Take 1 capsule (20 mg total) by mouth once daily.     oxybutynin 5 MG Tab  Commonly known as: DITROPAN  One tablet with lunch and supper for bladder problem     rosuvastatin 10 MG tablet  Commonly known as: CRESTOR  Take 1 tablet (10 mg total) by mouth once daily.        STOP taking these medications    acyclovir 800 MG Tab  Commonly known as: ZOVIRAX     amoxicillin 500 MG capsule  Commonly known as: AMOXIL     traMADoL 50 mg tablet  Commonly known as: ULTRAM            Indwelling Lines/Drains at time of discharge:   Lines/Drains/Airways     None               General: Patient resting comfortably in no acute distress. Appears as stated age. Calm  Eyes: EOM intact. No conjunctivae injection. No scleral icterus.  ENT: Hearing grossly intact. No discharge from ears. No nasal discharge.   CVS: RRR. No LE edema BL.  Lungs: CTA BL, no wheezing or crackles. Good breath sounds. No accessory muscle  use. No acute respiratory distress  Neuro: non focal , Follows commands. Responds appropriately  Time spent on the discharge of patient: 22 minutes         Arnol Medina MD  Department of Hospital Medicine  [facility]

## 2022-07-26 NOTE — TELEPHONE ENCOUNTER
----- Message from Bernie Almeida sent at 7/26/2022 12:34 PM CDT -----  This is for Sheila. This patient just got discharged today from [facility]. He has Pneumonia. He needs to see Dr. Kovacs in 1 week. When it gets put in I will let the patient know. Pt's # [phone] GH

## 2022-07-26 NOTE — PT/OT/SLP PROGRESS
Occupational Therapy      Patient Name:  Song Lopez    MRN:  [id number]    Patient not seen today secondary to Other (Comment) (Family declined OT services). Will d/c.    7/26/2022

## 2022-07-27 ENCOUNTER — PATIENT OUTREACH (OUTPATIENT)
Dept: FAMILY MEDICINE | Facility: CLINIC | Age: 82
End: 2022-07-27

## 2022-07-27 NOTE — PROGRESS NOTES
Discharge Information     Discharge Date:   7/26/22    Primary Discharge Diagnosis:  syncope      Discharge Summary:  Reviewed      Medication & Order Review     Were medication changes made or new medications added?   No    If so, has the patient filled the prescriptions?  No     Was Home Health ordered? No    If so, has Home Health contacted patient and/or initiated services?  No    Name of Home Health Agency? N/A    Durable Medical Equipment ordered?  No     If so, has the DME provider contacted patient and delivered equipment?  N/A    Follow Up               Any problems since discharge? No    How is the patient feeling since returning home?      Have you set up recommended follow up appointments?  (cardiology, surgery, etc.)    Schedule Hospital Follow-up appointment within 7-14 days (preferably 7).      Notes:  Spoke with pts daughter, states he is doing fine since d/c. Reports no med changes and no speciality appointments needed.             Sheila Anglin

## 2022-07-30 LAB
BACTERIA BLD CULT: NORMAL
BACTERIA BLD CULT: NORMAL

## 2022-08-02 ENCOUNTER — HOSPITAL ENCOUNTER (OUTPATIENT)
Dept: RADIOLOGY | Facility: HOSPITAL | Age: 82
Discharge: HOME | End: 2022-08-02
Attending: PHYSICIAN ASSISTANT
Payer: MEDICARE

## 2022-08-02 ENCOUNTER — OFFICE VISIT (OUTPATIENT)
Dept: FAMILY MEDICINE | Facility: CLINIC | Age: 82
End: 2022-08-02
Payer: MEDICARE

## 2022-08-02 VITALS
RESPIRATION RATE: 19 BRPM | BODY MASS INDEX: 19.26 KG/M2 | WEIGHT: 130 LBS | HEART RATE: 78 BPM | DIASTOLIC BLOOD PRESSURE: 76 MMHG | HEIGHT: 69 IN | SYSTOLIC BLOOD PRESSURE: 102 MMHG

## 2022-08-02 DIAGNOSIS — I50.32 CHRONIC DIASTOLIC HEART FAILURE: ICD-10-CM

## 2022-08-02 DIAGNOSIS — J18.9 BILATERAL UPPER LOBE COMMUNITY ACQUIRED PNEUMONIA: ICD-10-CM

## 2022-08-02 DIAGNOSIS — Z09 HOSPITAL DISCHARGE FOLLOW-UP: Primary | ICD-10-CM

## 2022-08-02 DIAGNOSIS — B35.1 ONYCHOMYCOSIS: ICD-10-CM

## 2022-08-02 DIAGNOSIS — G93.1 ANOXIC ENCEPHALOPATHY: ICD-10-CM

## 2022-08-02 PROCEDURE — 3288F PR FALLS RISK ASSESSMENT DOCUMENTED: ICD-10-PCS | Mod: CPTII,S$GLB,, | Performed by: PHYSICIAN ASSISTANT

## 2022-08-02 PROCEDURE — 71046 X-RAY EXAM CHEST 2 VIEWS: CPT | Mod: TC,PO

## 2022-08-02 PROCEDURE — 1111F DSCHRG MED/CURRENT MED MERGE: CPT | Mod: CPTII,S$GLB,, | Performed by: PHYSICIAN ASSISTANT

## 2022-08-02 PROCEDURE — 99496 TRANSITIONAL CARE MANAGE SERVICE 7 DAY DISCHARGE: ICD-10-PCS | Mod: S$GLB,,, | Performed by: PHYSICIAN ASSISTANT

## 2022-08-02 PROCEDURE — 1111F PR DISCHARGE MEDS RECONCILED W/ CURRENT OUTPATIENT MED LIST: ICD-10-PCS | Mod: CPTII,S$GLB,, | Performed by: PHYSICIAN ASSISTANT

## 2022-08-02 PROCEDURE — 1101F PT FALLS ASSESS-DOCD LE1/YR: CPT | Mod: CPTII,S$GLB,, | Performed by: PHYSICIAN ASSISTANT

## 2022-08-02 PROCEDURE — 3288F FALL RISK ASSESSMENT DOCD: CPT | Mod: CPTII,S$GLB,, | Performed by: PHYSICIAN ASSISTANT

## 2022-08-02 PROCEDURE — 1159F PR MEDICATION LIST DOCUMENTED IN MEDICAL RECORD: ICD-10-PCS | Mod: CPTII,S$GLB,, | Performed by: PHYSICIAN ASSISTANT

## 2022-08-02 PROCEDURE — 1101F PR PT FALLS ASSESS DOC 0-1 FALLS W/OUT INJ PAST YR: ICD-10-PCS | Mod: CPTII,S$GLB,, | Performed by: PHYSICIAN ASSISTANT

## 2022-08-02 PROCEDURE — 99496 TRANSJ CARE MGMT HIGH F2F 7D: CPT | Mod: S$GLB,,, | Performed by: PHYSICIAN ASSISTANT

## 2022-08-02 PROCEDURE — 1159F MED LIST DOCD IN RCRD: CPT | Mod: CPTII,S$GLB,, | Performed by: PHYSICIAN ASSISTANT

## 2022-08-02 PROCEDURE — 1126F PR PAIN SEVERITY QUANTIFIED, NO PAIN PRESENT: ICD-10-PCS | Mod: CPTII,S$GLB,, | Performed by: PHYSICIAN ASSISTANT

## 2022-08-02 PROCEDURE — 1126F AMNT PAIN NOTED NONE PRSNT: CPT | Mod: CPTII,S$GLB,, | Performed by: PHYSICIAN ASSISTANT

## 2022-08-02 RX ORDER — CEPHALEXIN 500 MG/1
500 CAPSULE ORAL 3 TIMES DAILY
COMMUNITY
Start: 2022-04-22 | End: 2023-02-22

## 2022-08-02 NOTE — PROGRESS NOTES
SUBJECTIVE:    Patient ID: Song Lopez Jr. is a 82 y.o. male.    Chief Complaint: Hospital Follow Up    This is an 82-year-old male who presents today for hospital discharge follow-up.  Admitted for altered mental status and kept overnight for neurological evaluation.  Discharge summary is pasted below:    Hospital course  Patient was admitted with altered mental status possible TIA.  But patient appear to have  dementia on  2 medications and appeared to be having some upper respiratory tract infections recently.  All neurological workup is negative with negative MRI brain.  Later he was awake alert oriented and no focal neurological deficit.  He refused to participate in PT.  No signs of infections appreciated except some airspace opacity in chest x-ray which Levaquin was started since admission.  Patient has no fever and no leukocytosis and patient was discharged with Levaquin course and follow with outpatient.  He walked and no orthostasis prior to discharge    Today patient is accompanied by caregivers today. He is getting stronger, day by day. Completed full course of levaquin. Daughter reports that cough is improving and certainly not as productive as it was prior. Failed outpt therapy prior to admission.           Admit Date: 7/25/22   Discharge Date: 7/26/22  Discharge Facility: Hospital    Medication Reconciliation:  Medications changed/added/deleted.  Levaquin 500 mg p.o. q.d.  New Prescriptions filled after discharge: yes  Discharge summary reviewed:  yes  Pending test results at discharge reviewed:   not applicable  Follow up appointments scheduled:  yes              with PCP, neurology  Follow up labs/tests ordered:   not applicable  Home Health ordered on discharge:   no  Home Health company name: n/a  DME ordered at discharge:   no  How patient is feeling since discharge from the hospital?  improved     Patient follow up phone call documented on separate encounter.      Admission on 07/25/2022,  Discharged on 07/26/2022   Component Date Value Ref Range Status    BNP 07/25/2022 135 (A) 0 - 99 pg/mL Final    WBC 07/25/2022 9.47  3.90 - 12.70 K/uL Final    RBC 07/25/2022 3.92 (A) 4.60 - 6.20 M/uL Final    Hemoglobin 07/25/2022 13.3 (A) 14.0 - 18.0 g/dL Final    Hematocrit 07/25/2022 40.0  40.0 - 54.0 % Final    MCV 07/25/2022 102 (A) 82 - 98 fL Final    MCH 07/25/2022 33.9 (A) 27.0 - 31.0 pg Final    MCHC 07/25/2022 33.3  32.0 - 36.0 g/dL Final    RDW 07/25/2022 13.9  11.5 - 14.5 % Final    Platelets 07/25/2022 340  150 - 450 K/uL Final    MPV 07/25/2022 11.1  9.2 - 12.9 fL Final    Immature Granulocytes 07/25/2022 0.6 (A) 0.0 - 0.5 % Final    Gran # (ANC) 07/25/2022 7.0  1.8 - 7.7 K/uL Final    Immature Grans (Abs) 07/25/2022 0.06 (A) 0.00 - 0.04 K/uL Final    Lymph # 07/25/2022 1.2  1.0 - 4.8 K/uL Final    Mono # 07/25/2022 0.9  0.3 - 1.0 K/uL Final    Eos # 07/25/2022 0.2  0.0 - 0.5 K/uL Final    Baso # 07/25/2022 0.13  0.00 - 0.20 K/uL Final    nRBC 07/25/2022 0  0 /100 WBC Final    Gran % 07/25/2022 73.8 (A) 38.0 - 73.0 % Final    Lymph % 07/25/2022 12.7 (A) 18.0 - 48.0 % Final    Mono % 07/25/2022 9.2  4.0 - 15.0 % Final    Eosinophil % 07/25/2022 2.3  0.0 - 8.0 % Final    Basophil % 07/25/2022 1.4  0.0 - 1.9 % Final    Platelet Estimate 07/25/2022 Appears normal   Final    Differential Method 07/25/2022 Automated   Final    Sodium 07/25/2022 138  136 - 145 mmol/L Final    Potassium 07/25/2022 3.5  3.5 - 5.1 mmol/L Final    Chloride 07/25/2022 105  95 - 110 mmol/L Final    CO2 07/25/2022 24  23 - 29 mmol/L Final    Glucose 07/25/2022 101  70 - 110 mg/dL Final    BUN 07/25/2022 25 (A) 8 - 23 mg/dL Final    Creatinine 07/25/2022 0.8  0.5 - 1.4 mg/dL Final    Calcium 07/25/2022 9.3  8.7 - 10.5 mg/dL Final    Total Protein 07/25/2022 7.1  6.0 - 8.4 g/dL Final    Albumin 07/25/2022 4.4  3.5 - 5.2 g/dL Final    Total Bilirubin 07/25/2022 0.9  0.1 - 1.0 mg/dL Final     Alkaline Phosphatase 07/25/2022 59  55 - 135 U/L Final    AST 07/25/2022 18  10 - 40 U/L Final    ALT 07/25/2022 14  10 - 44 U/L Final    Anion Gap 07/25/2022 9  8 - 16 mmol/L Final    eGFR if African American 07/25/2022 >60.0  >60 mL/min/1.73 m^2 Final    eGFR if non African American 07/25/2022 >60.0  >60 mL/min/1.73 m^2 Final    Benzodiazepines 07/25/2022 Negative  Negative Final    Cocaine (Metab.) 07/25/2022 Negative  Negative Final    Opiate Scrn, Ur 07/25/2022 Negative  Negative Final    Barbiturate Screen, Ur 07/25/2022 Negative  Negative Final    Amphetamine Screen, Ur 07/25/2022 Negative  Negative Final    THC 07/25/2022 Negative  Negative Final    Phencyclidine 07/25/2022 Negative  Negative Final    Creatinine, Urine 07/25/2022 64.0  23.0 - 375.0 mg/dL Final    Toxicology Information 07/25/2022 SEE COMMENT   Final    Troponin I 07/25/2022 <0.030  <=0.040 ng/mL Final    Specimen UA 07/25/2022 Urine, Clean Catch   Final    Color, UA 07/25/2022 Yellow  Yellow, Straw, [place] Final    Appearance, UA 07/25/2022 Clear  Clear Final    pH, UA 07/25/2022 7.0  5.0 - 8.0 Final    Specific Gravity, UA 07/25/2022 1.015  1.005 - 1.030 Final    Protein, UA 07/25/2022 Negative  Negative Final    Glucose, UA 07/25/2022 Negative  Negative Final    Ketones, UA 07/25/2022 Negative  Negative Final    Bilirubin (UA) 07/25/2022 Negative  Negative Final    Occult Blood UA 07/25/2022 Negative  Negative Final    Nitrite, UA 07/25/2022 Negative  Negative Final    Urobilinogen, UA 07/25/2022 Negative  Negative EU/dL Final    Leukocytes, UA 07/25/2022 Negative  Negative Final    BNP 07/25/2022 135 (A) 0 - 99 pg/mL Final    SARS-CoV-2 RNA, Amplification, Qual 07/25/2022 Negative  Negative Final    Blood Culture, Routine 07/25/2022 No growth after 5 days.   Final    Blood Culture, Routine 07/25/2022 No growth after 5 days.   Final    Lactate (Lactic Acid) 07/25/2022 1.0  0.5 - 1.9 mmol/L Final    POC  Glucose 07/25/2022 74  70 - 110 Final    Procalcitonin 07/25/2022 <0.05  0.00 - 0.50 ng/mL Final    TSH 07/25/2022 1.080  0.340 - 5.600 uIU/mL Final    Troponin I 07/25/2022 <0.030  <=0.040 ng/mL Final    Troponin I 07/26/2022 <0.030  <=0.040 ng/mL Final    Sodium 07/26/2022 140  136 - 145 mmol/L Final    Potassium 07/26/2022 4.1  3.5 - 5.1 mmol/L Final    Chloride 07/26/2022 102  95 - 110 mmol/L Final    CO2 07/26/2022 25  23 - 29 mmol/L Final    Glucose 07/26/2022 91  70 - 110 mg/dL Final    BUN 07/26/2022 24 (A) 8 - 23 mg/dL Final    Creatinine 07/26/2022 0.9  0.5 - 1.4 mg/dL Final    Calcium 07/26/2022 9.3  8.7 - 10.5 mg/dL Final    Anion Gap 07/26/2022 13  8 - 16 mmol/L Final    eGFR if African American 07/26/2022 >60.0  >60 mL/min/1.73 m^2 Final    eGFR if non African American 07/26/2022 >60.0  >60 mL/min/1.73 m^2 Final    Magnesium 07/26/2022 1.8  1.6 - 2.6 mg/dL Final    Cholesterol 07/26/2022 96 (A) 120 - 199 mg/dL Final    Triglycerides 07/26/2022 31  30 - 150 mg/dL Final    HDL 07/26/2022 27 (A) 40 - 75 mg/dL Final    LDL Cholesterol 07/26/2022 62.8 (A) 63.0 - 159.0 mg/dL Final    HDL/Cholesterol Ratio 07/26/2022 28.1  20.0 - 50.0 % Final    Total Cholesterol/HDL Ratio 07/26/2022 3.6  2.0 - 5.0 Final    Non-HDL Cholesterol 07/26/2022 69  mg/dL Final    WBC 07/26/2022 7.29  3.90 - 12.70 K/uL Final    RBC 07/26/2022 3.70 (A) 4.60 - 6.20 M/uL Final    Hemoglobin 07/26/2022 12.6 (A) 14.0 - 18.0 g/dL Final    Hematocrit 07/26/2022 37.7 (A) 40.0 - 54.0 % Final    MCV 07/26/2022 102 (A) 82 - 98 fL Final    MCH 07/26/2022 34.1 (A) 27.0 - 31.0 pg Final    MCHC 07/26/2022 33.4  32.0 - 36.0 g/dL Final    RDW 07/26/2022 13.9  11.5 - 14.5 % Final    Platelets 07/26/2022 301  150 - 450 K/uL Final    MPV 07/26/2022 10.8  9.2 - 12.9 fL Final    Immature Granulocytes 07/26/2022 0.5  0.0 - 0.5 % Final    Gran # (ANC) 07/26/2022 5.2  1.8 - 7.7 K/uL Final    Immature Grans (Abs) 07/26/2022  0.04  0.00 - 0.04 K/uL Final    Lymph # 07/26/2022 1.1  1.0 - 4.8 K/uL Final    Mono # 07/26/2022 0.7  0.3 - 1.0 K/uL Final    Eos # 07/26/2022 0.1  0.0 - 0.5 K/uL Final    Baso # 07/26/2022 0.10  0.00 - 0.20 K/uL Final    nRBC 07/26/2022 0  0 /100 WBC Final    Gran % 07/26/2022 71.9  38.0 - 73.0 % Final    Lymph % 07/26/2022 15.0 (A) 18.0 - 48.0 % Final    Mono % 07/26/2022 9.3  4.0 - 15.0 % Final    Eosinophil % 07/26/2022 1.9  0.0 - 8.0 % Final    Basophil % 07/26/2022 1.4  0.0 - 1.9 % Final    Differential Method 07/26/2022 Automated   Final    Hemoglobin A1C 07/26/2022 5.1  4.5 - 6.2 % Final    Estimated Avg Glucose 07/26/2022 100  68 - 131 mg/dL Final       Past Medical History:   Diagnosis Date    Chronic gout     Coronary artery disease     Hypertension     TERI (obstructive sleep apnea)     Polycythemia     Squamous cell carcinoma     Thyroid disease      Past Surgical History:   Procedure Laterality Date    ANGIOGRAM, CORONARY, WITH LEFT HEART CATHETERIZATION N/A 12/31/2020    Procedure: Angiogram, Coronary, with Left Heart Cath;  Surgeon: Jaz May MD;  Location: [facility] CATH/EP LAB;  Service: Cardiology;  Laterality: N/A;    CAROTID ARTERY ANGIOPLASTY  right    carotid endarderectomy      CATARACT EXTRACTION, BILATERAL      CATARACT EXTRACTION, BILATERAL      CORONARY ARTERY BYPASS GRAFT (CABG) N/A 1/1/2021    Procedure: CORONARY ARTERY BYPASS GRAFT (CABG);  Surgeon: Siva Mcnamara MD;  Location: [facility] OR;  Service: Cardiothoracic;  Laterality: N/A;    ENDOSCOPIC HARVEST OF VEIN  1/1/2021    Procedure: SURGICAL PROCUREMENT, VEIN, ENDOSCOPIC;  Surgeon: Siva Mcnamara MD;  Location: [facility] OR;  Service: Cardiothoracic;;    left wrist surgery       Family History   Problem Relation Age of Onset    Cancer Mother     Hypertension Mother     Heart disease Father        Marital Status:   Alcohol History:  reports current alcohol use of about 3.0 standard drinks of  alcohol per week.  Tobacco History:  reports that he has quit smoking. His smoking use included cigarettes. He has a 25.00 pack-year smoking history. He has never used smokeless tobacco.  Drug History:  reports no history of drug use.    Review of patient's allergies indicates:   Allergen Reactions    Doxycycline     Penicillins Other (See Comments)     Skin turns red or appears sunburned       Current Outpatient Medications:     allopurinoL (ZYLOPRIM) 100 MG tablet, Take 1 tablet (100 mg total) by mouth once daily., Disp: 90 tablet, Rfl: 1    aspirin (ECOTRIN) 81 MG EC tablet, Take 81 mg by mouth every evening. , Disp: , Rfl:     brimonidine 0.2% (ALPHAGAN) 0.2 % Drop, Place 1 drop into both eyes 3 (three) times daily. , Disp: , Rfl:     cyanocobalamin 500 MCG tablet, Take 500 mcg by mouth once daily., Disp: , Rfl:     furosemide (LASIX) 20 MG tablet, Take 1 tablet (20 mg total) by mouth once daily., Disp: 30 tablet, Rfl: 5    levoFLOXacin (LEVAQUIN) 500 MG tablet, Take 1 tablet (500 mg total) by mouth once daily., Disp: 4 tablet, Rfl: 0    levothyroxine (SYNTHROID) 88 MCG tablet, Take 1 tablet (88 mcg total) by mouth before breakfast., Disp: 90 tablet, Rfl: 1    megestroL (MEGACE) 40 MG Tab, Take 2 tablets p.o. b.i.d. for appetite. (Patient taking differently: Take 80 mg by mouth 2 (two) times daily Take 2 tablets p.o. b.i.d. for appetite.), Disp: 120 tablet, Rfl: 1    memantine (NAMENDA) 5 MG Tab, Take 1 tablet daily for 4 weeks then 1 tablet twice a day thereafter, Disp: 60 tablet, Rfl: 5    metoprolol tartrate (LOPRESSOR) 25 MG tablet, Take 1 tablet (25 mg total) by mouth once daily., Disp: 30 tablet, Rfl: 5    omeprazole (PRILOSEC) 20 MG capsule, Take 1 capsule (20 mg total) by mouth once daily., Disp: 90 capsule, Rfl: 3    oxybutynin (DITROPAN) 5 MG Tab, One tablet with lunch and supper for bladder problem, Disp: 60 tablet, Rfl: 2    rosuvastatin (CRESTOR) 10 MG tablet, Take 1 tablet (10 mg  total) by mouth once daily., Disp: 90 tablet, Rfl: 3    ARIPiprazole (ABILIFY) 5 MG Tab, Take 1 tablet (5 mg total) by mouth once daily. (Patient not taking: Reported on 8/2/2022), Disp: 30 tablet, Rfl: 3    cephALEXin (KEFLEX) 500 MG capsule, Take 500 mg by mouth 3 (three) times daily., Disp: , Rfl:     diphenoxylate-atropine 2.5-0.025 mg (LOMOTIL) 2.5-0.025 mg per tablet, Take 1 tablet by mouth 4 (four) times daily as needed for Diarrhea., Disp: , Rfl:     docusate sodium (COLACE) 100 MG capsule, Take 1 capsule (100 mg total) by mouth 2 (two) times daily. (Patient not taking: Reported on 8/2/2022), Disp: 60 capsule, Rfl: 0    donepeziL (ARICEPT) 10 MG tablet, Take 1 tablet (10 mg total) by mouth every evening., Disp: 90 tablet, Rfl: 1    hydroxyurea (HYDREA) 500 mg Cap, Take 1 po daily.. (Patient taking differently: Take 500 mg by mouth once daily Take 1 po daily..), Disp: 30 capsule, Rfl: 6    latanoprost 0.005 % ophthalmic solution, Place 1 drop into both eyes every evening., Disp: , Rfl:     Review of Systems   Constitutional: Positive for appetite change (appetite improving on megace) and unexpected weight change (losing  weight, ). Negative for chills, fatigue and fever.   HENT: Positive for congestion. Negative for ear pain and trouble swallowing.    Eyes: Negative for pain, discharge and visual disturbance.   Respiratory: Positive for cough. Negative for apnea, shortness of breath and wheezing.    Cardiovascular: Negative for chest pain and leg swelling.   Gastrointestinal: Negative for abdominal pain, blood in stool, constipation, diarrhea, nausea and vomiting.   Endocrine: Negative for cold intolerance, heat intolerance and polydipsia.   Genitourinary: Negative for dysuria, hematuria, testicular pain and urgency.   Musculoskeletal: Negative for gait problem, joint swelling and myalgias.        Losing muscle  mass   Neurological: Negative for dizziness, seizures and numbness.        Weak and   "falling   Psychiatric/Behavioral: Negative for agitation, behavioral problems and hallucinations. The patient is not nervous/anxious.         Poor effort and poor motivation to get better        Objective:      Vitals:    08/02/22 1328 08/02/22 1352   BP:  102/76   Pulse:  78   Resp: 19    Weight: 59 kg (130 lb) 59 kg (130 lb)   Height:  5' 9" (1.753 m)     Physical Exam    Assessment:       1. Hospital discharge follow-up    2. Bilateral upper lobe community acquired pneumonia    3. Anoxic encephalopathy    4. Chronic diastolic heart failure    5. Onychomycosis         Plan:       Hospital discharge follow-up  Comments:  hospital records fully reviewed and meds reconciled at this time. Pt is improving and back to baseline.    Bilateral upper lobe community acquired pneumonia  Comments:  Has completed course of levaquin. will need to get f/u CXR now to document improvement. If not we will need to check a CT for further eval.  Orders:  -     X-Ray Chest PA And Lateral; Future; Expected date: 08/02/2022    Anoxic encephalopathy  Comments:  overall stable    Chronic diastolic heart failure  Comments:  Has not had a recent echo since 2020. No overt evidence of fluid overload but will want to recheck now. Previous cardiologist- Dr. May    Onychomycosis  Comments:  severe thickening of all of his toenails. my recommendation to keep clean and dry. trimmed per podiatry.      Follow up if symptoms worsen or fail to improve, for as scheduled.            "

## 2022-08-03 ENCOUNTER — TELEPHONE (OUTPATIENT)
Dept: FAMILY MEDICINE | Facility: CLINIC | Age: 82
End: 2022-08-03

## 2022-08-03 NOTE — TELEPHONE ENCOUNTER
----- Message from Igor Rios PA-C sent at 8/3/2022  3:18 PM CDT -----  Call Bernie/Elisabeth or Em and let them know that pneumonia has improved but we need to recheck CXR in 2 weeks to make sure it has fully resolved. They should let us know if his symptoms worsen.

## 2022-08-10 DIAGNOSIS — I48.0 PAROXYSMAL A-FIB: ICD-10-CM

## 2022-08-10 DIAGNOSIS — E78.2 MIXED HYPERLIPIDEMIA: ICD-10-CM

## 2022-08-10 RX ORDER — ROSUVASTATIN CALCIUM 10 MG/1
10 TABLET, COATED ORAL DAILY
Qty: 90 TABLET | Refills: 3 | Status: SHIPPED | OUTPATIENT
Start: 2022-08-10 | End: 2023-08-10

## 2022-08-10 RX ORDER — METOPROLOL TARTRATE 25 MG/1
25 TABLET, FILM COATED ORAL DAILY
Qty: 30 TABLET | Refills: 5 | Status: SHIPPED | OUTPATIENT
Start: 2022-08-10 | End: 2023-02-06 | Stop reason: SDUPTHER

## 2022-08-10 RX ORDER — FUROSEMIDE 20 MG/1
20 TABLET ORAL DAILY
Qty: 30 TABLET | Refills: 5 | Status: SHIPPED | OUTPATIENT
Start: 2022-08-10 | End: 2023-02-06 | Stop reason: SDUPTHER

## 2022-08-10 NOTE — TELEPHONE ENCOUNTER
----- Message from Bernie Almeida sent at 8/10/2022  8:01 AM CDT -----  Refills Rosuvastatin, Furosemide, and Metoprolol. [facility] [address]. Pt's # [phone] GH

## 2022-08-17 ENCOUNTER — TELEPHONE (OUTPATIENT)
Dept: FAMILY MEDICINE | Facility: CLINIC | Age: 82
End: 2022-08-17

## 2022-08-17 DIAGNOSIS — J18.9 BILATERAL UPPER LOBE COMMUNITY ACQUIRED PNEUMONIA: Primary | ICD-10-CM

## 2022-08-17 NOTE — TELEPHONE ENCOUNTER
----- Message from Katie Blas MA sent at 8/4/2022  3:07 PM CDT -----  Regarding: X-Ray Due  Message from Igor Rios PA-C sent at 8/3/2022  3:18 PM CDT -----  Call Bernie/Elisabeth or Em and let them know that pneumonia has improved but we need to recheck CXR in 2 weeks to make sure it has fully resolved. They should let us know if his symptoms worsen.

## 2022-08-31 ENCOUNTER — TELEPHONE (OUTPATIENT)
Dept: FAMILY MEDICINE | Facility: CLINIC | Age: 82
End: 2022-08-31

## 2022-09-08 ENCOUNTER — TELEPHONE (OUTPATIENT)
Dept: FAMILY MEDICINE | Facility: CLINIC | Age: 82
End: 2022-09-08

## 2022-09-08 NOTE — TELEPHONE ENCOUNTER
Med rec updated and complete per pts RX bottles at bedside.  RX bottles returned to pt.  NKDA  Pt states she finished an ABX this month. Unaware of ABX name.    Spoke to Bernie, said it is very hard to get him to imaging facility. Said her sister was supposed to bring him a few weeks ago, but couldn't. Said they need 2 people to get him there and it is hard to make that happen. Said that he is not running any fever, not coughing. FYI to Dr Kovacs. This makes 3 attempts. Can I odette reminder complete?

## 2022-09-23 ENCOUNTER — HOSPITAL ENCOUNTER (OUTPATIENT)
Dept: CARDIOLOGY | Facility: HOSPITAL | Age: 82
Discharge: HOME | End: 2022-09-23
Attending: PHYSICIAN ASSISTANT
Payer: MEDICARE

## 2022-09-23 ENCOUNTER — HOSPITAL ENCOUNTER (OUTPATIENT)
Dept: RADIOLOGY | Facility: HOSPITAL | Age: 82
Discharge: HOME | End: 2022-09-23
Attending: PHYSICIAN ASSISTANT
Payer: MEDICARE

## 2022-09-23 VITALS — WEIGHT: 130 LBS | BODY MASS INDEX: 18.2 KG/M2 | HEIGHT: 71 IN

## 2022-09-23 DIAGNOSIS — I50.32 CHRONIC DIASTOLIC HEART FAILURE: ICD-10-CM

## 2022-09-23 DIAGNOSIS — J18.9 BILATERAL UPPER LOBE COMMUNITY ACQUIRED PNEUMONIA: ICD-10-CM

## 2022-09-23 PROCEDURE — 71046 X-RAY EXAM CHEST 2 VIEWS: CPT | Mod: TC

## 2022-09-23 PROCEDURE — 93306 TTE W/DOPPLER COMPLETE: CPT | Mod: 26,,, | Performed by: INTERNAL MEDICINE

## 2022-09-23 PROCEDURE — 93306 ECHO (CUPID ONLY): ICD-10-PCS | Mod: 26,,, | Performed by: INTERNAL MEDICINE

## 2022-09-23 PROCEDURE — 93306 TTE W/DOPPLER COMPLETE: CPT

## 2022-09-27 DIAGNOSIS — E03.9 ACQUIRED HYPOTHYROIDISM: ICD-10-CM

## 2022-09-27 LAB
AORTIC VALVE CUSP SEPERATION: 1.5 CM
AV INDEX (PROSTH): 0.65
AV MEAN GRADIENT: 4 MMHG
AV PEAK GRADIENT: 4 MMHG
AV VALVE AREA: 2.04 CM2
AV VELOCITY RATIO: 0.4
BSA FOR ECHO PROCEDURE: 1.72 M2
CV ECHO LV RWT: 0.5 CM
DOP CALC AO PEAK VEL: 0.97 M/S
DOP CALC AO VTI: 18.2 CM
DOP CALC LVOT AREA: 3.1 CM2
DOP CALC LVOT DIAMETER: 2 CM
DOP CALC LVOT PEAK VEL: 0.39 M/S
DOP CALC LVOT STROKE VOLUME: 37.05 CM3
DOP CALCLVOT PEAK VEL VTI: 11.8 CM
E WAVE DECELERATION TIME: 204 MS
E/A RATIO: 0.78
E/E' RATIO: 4.88 M/S
ECHO LV POSTERIOR WALL: 0.77 CM (ref 0.6–1.1)
EJECTION FRACTION: 60 %
FRACTIONAL SHORTENING: 20 % (ref 28–44)
INTERVENTRICULAR SEPTUM: 1 CM (ref 0.6–1.1)
IVRT: 208 MS
LEFT ATRIUM SIZE: 2.4 CM
LEFT INTERNAL DIMENSION IN SYSTOLE: 2.47 CM (ref 2.1–4)
LEFT VENTRICLE MASS INDEX: 41 G/M2
LEFT VENTRICULAR INTERNAL DIMENSION IN DIASTOLE: 3.1 CM (ref 3.5–6)
LEFT VENTRICULAR MASS: 71.91 G
LV LATERAL E/E' RATIO: 5.57 M/S
LV SEPTAL E/E' RATIO: 4.33 M/S
MV PEAK A VEL: 0.5 M/S
MV PEAK E VEL: 0.39 M/S
MV STENOSIS PRESSURE HALF TIME: 89 MS
MV VALVE AREA P 1/2 METHOD: 2.47 CM2
PISA TR MAX VEL: 2.09 M/S
RA PRESSURE: 3 MMHG
RIGHT VENTRICULAR END-DIASTOLIC DIMENSION: 1.76 CM
TDI LATERAL: 0.07 M/S
TDI SEPTAL: 0.09 M/S
TDI: 0.08 M/S
TR MAX PG: 17 MMHG
TV REST PULMONARY ARTERY PRESSURE: 20 MMHG

## 2022-09-27 RX ORDER — LEVOTHYROXINE SODIUM 88 UG/1
88 TABLET ORAL
Qty: 90 TABLET | Refills: 1 | Status: SHIPPED | OUTPATIENT
Start: 2022-09-27 | End: 2023-05-01 | Stop reason: SDUPTHER

## 2022-09-27 NOTE — TELEPHONE ENCOUNTER
----- Message from Bernie Almeida sent at 9/27/2022  8:13 AM CDT -----  Refill  Euthyrox 88 mg #90 [facility] on [address]. Pt's # [phone] GH

## 2022-09-29 ENCOUNTER — TELEPHONE (OUTPATIENT)
Dept: FAMILY MEDICINE | Facility: CLINIC | Age: 82
End: 2022-09-29

## 2022-09-29 NOTE — TELEPHONE ENCOUNTER
Family informed of xray and echo, would like to wait on the CT until talking to her daughter and Song and will call back when ready to schedule.

## 2022-09-29 NOTE — TELEPHONE ENCOUNTER
----- Message from Igor Rios PA-C sent at 9/27/2022  4:28 PM CDT -----  There is a persistent ill defined opacities at lung apices. These are stable compared to Aug 2 imaging. I would like to order a CT chest with contrast to further evaluate. If patient and/or family is open to this we can place the order.

## 2022-10-18 ENCOUNTER — TELEPHONE (OUTPATIENT)
Dept: FAMILY MEDICINE | Facility: CLINIC | Age: 82
End: 2022-10-18

## 2022-10-18 DIAGNOSIS — R41.82 ALTERED MENTAL STATUS, UNSPECIFIED ALTERED MENTAL STATUS TYPE: ICD-10-CM

## 2022-10-18 DIAGNOSIS — I50.32 CHRONIC DIASTOLIC HEART FAILURE: ICD-10-CM

## 2022-10-18 DIAGNOSIS — G93.1 ANOXIC ENCEPHALOPATHY: Primary | ICD-10-CM

## 2022-10-18 DIAGNOSIS — J84.9 INTERSTITIAL PULMONARY DISEASE, UNSPECIFIED: ICD-10-CM

## 2022-10-18 DIAGNOSIS — J84.10 PULMONARY FIBROSIS, UNSPECIFIED: Primary | ICD-10-CM

## 2022-10-18 DIAGNOSIS — I27.20 PULMONARY HYPERTENSION: Chronic | ICD-10-CM

## 2022-10-18 DIAGNOSIS — R54 FRAILTY: ICD-10-CM
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
## 2022-10-18 NOTE — TELEPHONE ENCOUNTER
----- Message from Bernie Almeida sent at 10/18/2022  9:19 AM CDT -----  Dr. Kovacs wanted this patient to have a CT SCAN of his Chest with contrast next week. It may need a PA. Can you order a transfer light weight wheel chair?  Pt's  daughter's Angelo Gibbs [phone] GH

## 2022-10-31 ENCOUNTER — CLINICAL SUPPORT (OUTPATIENT)
Dept: FAMILY MEDICINE | Facility: CLINIC | Age: 82
End: 2022-10-31
Payer: MEDICARE

## 2022-10-31 ENCOUNTER — HOSPITAL ENCOUNTER (OUTPATIENT)
Dept: RADIOLOGY | Facility: HOSPITAL | Age: 82
Discharge: HOME | End: 2022-10-31
Attending: FAMILY MEDICINE
Payer: MEDICARE

## 2022-10-31 DIAGNOSIS — J84.9 INTERSTITIAL PULMONARY DISEASE, UNSPECIFIED: ICD-10-CM

## 2022-10-31 DIAGNOSIS — Z23 NEED FOR INFLUENZA VACCINATION: Primary | ICD-10-CM

## 2022-10-31 DIAGNOSIS — M1A.0790 IDIOPATHIC CHRONIC GOUT OF FOOT WITHOUT TOPHUS, UNSPECIFIED LATERALITY: ICD-10-CM

## 2022-10-31 DIAGNOSIS — D45 POLYCYTHEMIA VERA: ICD-10-CM

## 2022-10-31 LAB
CREAT SERPL-MCNC: 2.6 MG/DL (ref 0.5–1.4)
SAMPLE: ABNORMAL

## 2022-10-31 PROCEDURE — 90662 FLU VACCINE - QUADRIVALENT - HIGH DOSE (65+) PRESERVATIVE FREE IM: ICD-10-PCS | Mod: S$GLB,,, | Performed by: FAMILY MEDICINE

## 2022-10-31 PROCEDURE — G0008 ADMIN INFLUENZA VIRUS VAC: HCPCS | Mod: S$GLB,,, | Performed by: FAMILY MEDICINE

## 2022-10-31 PROCEDURE — 90662 IIV NO PRSV INCREASED AG IM: CPT | Mod: S$GLB,,, | Performed by: FAMILY MEDICINE

## 2022-10-31 PROCEDURE — 71250 CT THORAX DX C-: CPT | Mod: TC,PO

## 2022-10-31 PROCEDURE — G0008 FLU VACCINE - QUADRIVALENT - HIGH DOSE (65+) PRESERVATIVE FREE IM: ICD-10-PCS | Mod: S$GLB,,, | Performed by: FAMILY MEDICINE

## 2022-10-31 RX ORDER — HYDROXYUREA 500 MG/1
500 CAPSULE ORAL DAILY
Qty: 90 CAPSULE | Refills: 5 | Status: SHIPPED | OUTPATIENT
Start: 2022-10-31

## 2022-10-31 RX ORDER — ALLOPURINOL 100 MG/1
100 TABLET ORAL DAILY
Qty: 90 TABLET | Refills: 1 | Status: SHIPPED | OUTPATIENT
Start: 2022-10-31 | End: 2023-05-15 | Stop reason: SDUPTHER

## 2022-10-31 NOTE — TELEPHONE ENCOUNTER
----- Message from Bernie Almeida sent at 10/31/2022  8:44 AM CDT -----  Refills Hydroxyurea and Allopurinol [facility]  on [address]  Pt's # [phone] GH

## 2022-11-14 DIAGNOSIS — R63.4 WEIGHT LOSS: ICD-10-CM

## 2022-11-14 RX ORDER — MEGESTROL ACETATE 40 MG/1
TABLET ORAL
Qty: 120 TABLET | Refills: 1 | Status: SHIPPED | OUTPATIENT
Start: 2022-11-14 | End: 2023-03-24 | Stop reason: SDUPTHER

## 2022-11-14 NOTE — TELEPHONE ENCOUNTER
----- Message from Bernie Almeida sent at 11/14/2022  3:05 PM CST -----  Refill Megestrol [facility] on [address]. The pharmacy said he has no more refills. GH

## 2022-11-15 DIAGNOSIS — G31.84 MCI (MILD COGNITIVE IMPAIRMENT): ICD-10-CM

## 2022-11-15 RX ORDER — DONEPEZIL HYDROCHLORIDE 10 MG/1
10 TABLET, FILM COATED ORAL NIGHTLY
Qty: 90 TABLET | Refills: 1 | Status: SHIPPED | OUTPATIENT
Start: 2022-11-15 | End: 2022-12-13

## 2022-11-15 NOTE — TELEPHONE ENCOUNTER
----- Message from Bernie Almeida sent at 11/15/2022  8:42 AM CST -----  Refill Donepezil [facility] [address]. Pt's # [phone] GH

## 2022-12-06 RX ORDER — OXYBUTYNIN CHLORIDE 5 MG/1
TABLET ORAL
Qty: 60 TABLET | Refills: 5 | Status: SHIPPED | OUTPATIENT
Start: 2022-12-06

## 2022-12-06 NOTE — TELEPHONE ENCOUNTER
----- Message from Bernie Almeida sent at 12/6/2022  8:26 AM CST -----  Refill Oxybutynin 5 mg [facility] on [address]. Pt's # [phone] GH

## 2022-12-13 ENCOUNTER — TELEPHONE (OUTPATIENT)
Dept: FAMILY MEDICINE | Facility: CLINIC | Age: 82
End: 2022-12-13

## 2022-12-13 NOTE — TELEPHONE ENCOUNTER
----- Message from Bernie Almeida sent at 12/13/2022 11:43 AM CST -----  This patient is not on Donepezil. He had side effects. He got very very lethargic. This was a while back. Dr. Kovacs took him off of it. Pt's # [phone] GH

## 2022-12-21 RX ORDER — MEMANTINE HYDROCHLORIDE 10 MG/1
10 TABLET ORAL 2 TIMES DAILY
Qty: 60 TABLET | Refills: 3 | Status: SHIPPED | OUTPATIENT
Start: 2022-12-21 | End: 2023-12-21

## 2022-12-21 NOTE — TELEPHONE ENCOUNTER
----- Message from Bernie Almeida sent at 12/21/2022  2:01 PM CST -----  The patient has been having bad days in the afternoon for about 2 or 3 weeks on and off. He has some anger issues and wants to leave the house some other things. We can handle him right now. I am asking if you can increase his Memantine 5 mg 2 times a day to a higher mg. What would your suggestion be? Talk to Elvira Herron on [address]

## 2022-12-22 ENCOUNTER — TELEPHONE (OUTPATIENT)
Dept: FAMILY MEDICINE | Facility: CLINIC | Age: 82
End: 2022-12-22

## 2022-12-22 RX ORDER — SULFAMETHOXAZOLE AND TRIMETHOPRIM 800; 160 MG/1; MG/1
1 TABLET ORAL 2 TIMES DAILY
Qty: 20 TABLET | Refills: 0 | Status: SHIPPED | OUTPATIENT
Start: 2022-12-22 | End: 2023-02-22

## 2022-12-22 NOTE — TELEPHONE ENCOUNTER
Patients daughter states he has a bad head cough, congestion and a cough. Okay per Dr. Kovacs to send abx to [facility] [address].

## 2022-12-23 DIAGNOSIS — U07.1 COVID: Primary | ICD-10-CM

## 2023-01-09 DIAGNOSIS — R41.82 ALTERED MENTAL STATUS, UNSPECIFIED ALTERED MENTAL STATUS TYPE: Primary | ICD-10-CM

## 2023-01-12 ENCOUNTER — TELEPHONE (OUTPATIENT)
Dept: FAMILY MEDICINE | Facility: CLINIC | Age: 83
End: 2023-01-12

## 2023-01-12 LAB
APPEARANCE UR: ABNORMAL
BACTERIA #/AREA URNS HPF: ABNORMAL /HPF
BACTERIA UR CULT: ABNORMAL
BACTERIA UR CULT: ABNORMAL
BILIRUB UR QL STRIP: NEGATIVE
COLOR UR: YELLOW
GLUCOSE UR QL STRIP: NEGATIVE
HGB UR QL STRIP: ABNORMAL
HYALINE CASTS #/AREA URNS LPF: ABNORMAL /LPF
KETONES UR QL STRIP: NEGATIVE
LEUKOCYTE ESTERASE UR QL STRIP: ABNORMAL
NITRITE UR QL STRIP: POSITIVE
PH UR STRIP: 5.5 [PH] (ref 5–8)
PROT UR QL STRIP: NEGATIVE
RBC #/AREA URNS HPF: ABNORMAL /HPF
SERVICE CMNT-IMP: ABNORMAL
SP GR UR STRIP: 1.02 (ref 1–1.03)
SQUAMOUS #/AREA URNS HPF: ABNORMAL /HPF
WBC #/AREA URNS HPF: ABNORMAL /HPF

## 2023-01-12 RX ORDER — CIPROFLOXACIN 500 MG/1
500 TABLET ORAL 2 TIMES DAILY
Qty: 20 TABLET | Refills: 0 | Status: SHIPPED | OUTPATIENT
Start: 2023-01-12 | End: 2023-02-23 | Stop reason: SDUPTHER

## 2023-01-12 NOTE — TELEPHONE ENCOUNTER
----- Message from Jake Kovacs MD sent at 1/12/2023  1:46 PM CST -----  Call patient's wife.  He has an E coli UTI.  We will call in Cipro 500 mg b.i.d. times 10 days to his pharmacy

## 2023-01-17 DIAGNOSIS — R41.82 ALTERED MENTAL STATUS, UNSPECIFIED ALTERED MENTAL STATUS TYPE: Primary | ICD-10-CM

## 2023-01-17 RX ORDER — QUETIAPINE FUMARATE 50 MG/1
TABLET, FILM COATED ORAL
Qty: 30 TABLET | Refills: 3 | Status: SHIPPED | OUTPATIENT
Start: 2023-01-17

## 2023-01-30 DIAGNOSIS — K21.9 GASTRO-ESOPHAGEAL REFLUX DISEASE WITHOUT ESOPHAGITIS: ICD-10-CM

## 2023-01-30 RX ORDER — OMEPRAZOLE 20 MG/1
20 CAPSULE, DELAYED RELEASE ORAL DAILY
Qty: 90 CAPSULE | Refills: 3 | Status: SHIPPED | OUTPATIENT
Start: 2023-01-30 | End: 2023-05-15 | Stop reason: SDUPTHER

## 2023-01-30 RX ORDER — OMEPRAZOLE 20 MG/1
20 CAPSULE, DELAYED RELEASE ORAL DAILY
Qty: 90 CAPSULE | Refills: 3 | Status: SHIPPED | OUTPATIENT
Start: 2023-01-30 | End: 2023-01-30 | Stop reason: SDUPTHER

## 2023-01-30 NOTE — TELEPHONE ENCOUNTER
----- Message from Jeannette Dey LPN sent at 1/30/2023 11:43 AM CST -----    ----- Message -----  From: Debbie Aleman  Sent: 1/30/2023  11:34 AM CST  To: Jake Kovacs Staff    Pt needs refill on omeprazole   [phone]

## 2023-01-30 NOTE — TELEPHONE ENCOUNTER
----- Message from Debbie Aleman sent at 1/30/2023 11:33 AM CST -----  Pt needs refill on omeprazole   [phone]

## 2023-01-30 NOTE — TELEPHONE ENCOUNTER
Rx set up for you to send in. Per  Bernie Dr. Kovacs goes to her house to see him, since he is barely able to walk to be able to come in here.

## 2023-02-06 DIAGNOSIS — I48.0 PAROXYSMAL A-FIB: ICD-10-CM

## 2023-02-06 RX ORDER — FUROSEMIDE 20 MG/1
20 TABLET ORAL DAILY
Qty: 30 TABLET | Refills: 5 | Status: SHIPPED | OUTPATIENT
Start: 2023-02-06 | End: 2024-02-06

## 2023-02-06 RX ORDER — METOPROLOL TARTRATE 25 MG/1
25 TABLET, FILM COATED ORAL DAILY
Qty: 30 TABLET | Refills: 5 | Status: SHIPPED | OUTPATIENT
Start: 2023-02-06 | End: 2024-02-06

## 2023-02-06 NOTE — TELEPHONE ENCOUNTER
----- Message from Bernie Almeida sent at 2/6/2023  9:01 AM CST -----  Refill Metoprolol # 90 and Furosemide # 90   [facility] [address]. Pt's # [phone] GH

## 2023-02-22 NOTE — TELEPHONE ENCOUNTER
----- Message from Bernie Almeida sent at 2/22/2023  1:56 PM CST -----  This goes to Ar[facility]. The patients wife tried to get a urine for an hour this morning. He could not urinate. His urine is very yellow very cloudy and has a bad smell. Dr. Kovacs called in something for his last UTI. He took it twice a day for 10 days. I am not sure what it was. [facility] [address]. GH

## 2023-02-23 RX ORDER — CIPROFLOXACIN 500 MG/1
500 TABLET ORAL 2 TIMES DAILY
Qty: 20 TABLET | Refills: 0 | Status: SHIPPED | OUTPATIENT
Start: 2023-02-23

## 2023-03-01 ENCOUNTER — OUTSIDE PLACE OF SERVICE (OUTPATIENT)
Dept: FAMILY MEDICINE | Facility: CLINIC | Age: 83
End: 2023-03-01
Payer: MEDICARE

## 2023-03-01 DIAGNOSIS — G93.1 ANOXIC ENCEPHALOPATHY: ICD-10-CM

## 2023-03-01 DIAGNOSIS — L89.159 SACRAL DECUBITUS ULCER: Primary | ICD-10-CM

## 2023-03-01 DIAGNOSIS — L89.152 PRESSURE INJURY OF SACRAL REGION, STAGE 2: Primary | ICD-10-CM

## 2023-03-01 DIAGNOSIS — I25.10 CAD (CORONARY ARTERY DISEASE): ICD-10-CM

## 2023-03-01 DIAGNOSIS — Z95.1 S/P CABG (CORONARY ARTERY BYPASS GRAFT): Chronic | ICD-10-CM

## 2023-03-01 DIAGNOSIS — E43 SEVERE MALNUTRITION: Chronic | ICD-10-CM

## 2023-03-01 DIAGNOSIS — Z74.1 NEED FOR ASSISTANCE WITH PERSONAL CARE: ICD-10-CM

## 2023-03-01 PROCEDURE — 1159F MED LIST DOCD IN RCRD: CPT | Mod: CPTII,S$GLB,, | Performed by: FAMILY MEDICINE

## 2023-03-01 PROCEDURE — 1160F PR REVIEW ALL MEDS BY PRESCRIBER/CLIN PHARMACIST DOCUMENTED: ICD-10-PCS | Mod: CPTII,S$GLB,, | Performed by: FAMILY MEDICINE

## 2023-03-01 PROCEDURE — 99349 PR HOME VISIT,ESTAB PATIENT,LEVEL III: ICD-10-PCS | Mod: S$GLB,,, | Performed by: FAMILY MEDICINE

## 2023-03-01 PROCEDURE — 99349 HOME/RES VST EST MOD MDM 40: CPT | Mod: S$GLB,,, | Performed by: FAMILY MEDICINE

## 2023-03-01 PROCEDURE — 1160F RVW MEDS BY RX/DR IN RCRD: CPT | Mod: CPTII,S$GLB,, | Performed by: FAMILY MEDICINE

## 2023-03-01 PROCEDURE — 1159F PR MEDICATION LIST DOCUMENTED IN MEDICAL RECORD: ICD-10-PCS | Mod: CPTII,S$GLB,, | Performed by: FAMILY MEDICINE

## 2023-03-03 DIAGNOSIS — L89.152 PRESSURE INJURY OF SACRAL REGION, STAGE 2: Primary | ICD-10-CM

## 2023-03-06 PROCEDURE — G0180 MD CERTIFICATION HHA PATIENT: HCPCS | Mod: ,,, | Performed by: FAMILY MEDICINE

## 2023-03-06 PROCEDURE — G0180 PR HOME HEALTH MD CERTIFICATION: ICD-10-PCS | Mod: ,,, | Performed by: FAMILY MEDICINE

## 2023-03-08 ENCOUNTER — TELEPHONE (OUTPATIENT)
Dept: FAMILY MEDICINE | Facility: CLINIC | Age: 83
End: 2023-03-08

## 2023-03-08 DIAGNOSIS — R39.9 UTI SYMPTOMS: Primary | ICD-10-CM

## 2023-03-09 LAB
APPEARANCE UR: CLEAR
BACTERIA #/AREA URNS HPF: ABNORMAL /HPF
BACTERIA UR CULT: ABNORMAL
BILIRUB UR QL STRIP: NEGATIVE
COLOR UR: YELLOW
GLUCOSE UR QL STRIP: NEGATIVE
HGB UR QL STRIP: NEGATIVE
HYALINE CASTS #/AREA URNS LPF: ABNORMAL /LPF
KETONES UR QL STRIP: NEGATIVE
LEUKOCYTE ESTERASE UR QL STRIP: NEGATIVE
NITRITE UR QL STRIP: NEGATIVE
PH UR STRIP: 6.5 [PH] (ref 5–8)
PROT UR QL STRIP: ABNORMAL
RBC #/AREA URNS HPF: ABNORMAL /HPF
SERVICE CMNT-IMP: ABNORMAL
SP GR UR STRIP: 1.01 (ref 1–1.03)
SQUAMOUS #/AREA URNS HPF: ABNORMAL /HPF
WBC #/AREA URNS HPF: ABNORMAL /HPF

## 2023-03-14 ENCOUNTER — TELEPHONE (OUTPATIENT)
Dept: FAMILY MEDICINE | Facility: CLINIC | Age: 83
End: 2023-03-14

## 2023-03-14 DIAGNOSIS — I50.32 CHRONIC DIASTOLIC HEART FAILURE: ICD-10-CM

## 2023-03-14 DIAGNOSIS — L89.152 PRESSURE INJURY OF SACRAL REGION, STAGE 2: Primary | ICD-10-CM

## 2023-03-14 DIAGNOSIS — G93.1 ANOXIC ENCEPHALOPATHY: ICD-10-CM

## 2023-03-14 NOTE — TELEPHONE ENCOUNTER
----- Message from Bernie Almeida sent at 3/14/2023  8:21 AM CDT -----  The patient needs a regular hospital bed with a gel over lay. He has [employer] ins. Pt's # [phone] GH

## 2023-03-14 NOTE — TELEPHONE ENCOUNTER
Patients daughter in law states she would like to change the order - the wife is now agreeable to [facility] referral for evaluation.

## 2023-03-15 DIAGNOSIS — F01.C18 SEVERE VASCULAR DEMENTIA WITH OTHER BEHAVIORAL DISTURBANCE: ICD-10-CM

## 2023-03-15 DIAGNOSIS — G93.1 ANOXIC ENCEPHALOPATHY: Primary | ICD-10-CM

## 2023-03-24 DIAGNOSIS — R63.4 WEIGHT LOSS: ICD-10-CM

## 2023-03-24 RX ORDER — MEGESTROL ACETATE 40 MG/1
TABLET ORAL
Qty: 120 TABLET | Refills: 1 | Status: SHIPPED | OUTPATIENT
Start: 2023-03-24

## 2023-03-24 NOTE — TELEPHONE ENCOUNTER
----- Message from Bernie Almeida sent at 3/24/2023  8:10 AM CDT -----   Refill   Megestrol [facility] [address]. Pt's # [phone] GH

## 2023-04-10 ENCOUNTER — DOCUMENT SCAN (OUTPATIENT)
Dept: HOME HEALTH SERVICES | Facility: HOSPITAL | Age: 83
End: 2023-04-10
Payer: MEDICARE

## 2023-04-18 ENCOUNTER — EXTERNAL HOME HEALTH (OUTPATIENT)
Dept: HOME HEALTH SERVICES | Facility: HOSPITAL | Age: 83
End: 2023-04-18
Payer: MEDICARE

## 2023-05-01 DIAGNOSIS — E03.9 ACQUIRED HYPOTHYROIDISM: ICD-10-CM

## 2023-05-01 RX ORDER — LEVOTHYROXINE SODIUM 88 UG/1
88 TABLET ORAL
Qty: 90 TABLET | Refills: 1 | Status: SHIPPED | OUTPATIENT
Start: 2023-05-01

## 2023-05-01 NOTE — TELEPHONE ENCOUNTER
----- Message from Bernie Almeida sent at 5/1/2023  9:14 AM CDT -----  Refill Levothyroxine [facility] [address]. Pt's # [phone] GH

## 2023-05-15 DIAGNOSIS — M1A.0790 IDIOPATHIC CHRONIC GOUT OF FOOT WITHOUT TOPHUS, UNSPECIFIED LATERALITY: ICD-10-CM

## 2023-05-15 DIAGNOSIS — K21.9 GASTRO-ESOPHAGEAL REFLUX DISEASE WITHOUT ESOPHAGITIS: ICD-10-CM

## 2023-05-15 RX ORDER — OMEPRAZOLE 20 MG/1
20 CAPSULE, DELAYED RELEASE ORAL DAILY
Qty: 90 CAPSULE | Refills: 3 | Status: SHIPPED | OUTPATIENT
Start: 2023-05-15

## 2023-05-15 RX ORDER — ALLOPURINOL 100 MG/1
100 TABLET ORAL DAILY
Qty: 90 TABLET | Refills: 1 | Status: SHIPPED | OUTPATIENT
Start: 2023-05-15

## 2023-05-15 NOTE — TELEPHONE ENCOUNTER
----- Message from Bernie Almeida sent at 5/15/2023 10:35 AM CDT -----  Refill Allopurinol and Omeprazole. [facility] [address]. Pt's # [phone] GH

## 2023-05-19 RX ORDER — POLYMYXIN B SULFATE AND TRIMETHOPRIM 1; 10000 MG/ML; [USP'U]/ML
1 SOLUTION OPHTHALMIC EVERY 4 HOURS
Qty: 10 ML | Refills: 0 | Status: SHIPPED | OUTPATIENT
Start: 2023-05-19

## 2023-05-19 NOTE — TELEPHONE ENCOUNTER
Pt wife declines states pt only c/o red itchy eyes. Advised eye drops will be sent by the end of the day. Call Monday if not better.     Please review.

## 2023-05-19 NOTE — TELEPHONE ENCOUNTER
Is he having any thick purulent drainage or just allergy, rhinorrhea etc? It wouldn't hurt to just call in some polytrim drops to apply every 4-6 hours to be sure.

## 2023-05-19 NOTE — TELEPHONE ENCOUNTER
----- Message from Bernie Almeida sent at 5/19/2023  9:05 AM CDT -----  The patient has red itchy eyes. Can I get an OTC eye drop or a prescription called in?  [facility] [address]. Pt's # [phone] GH

## (undated) DEVICE — SUTURE PROLENE 5-0 36 C-1 8720H

## (undated) DEVICE — TRAY HEART DRAPE BREAUX SMHS013-07

## (undated) DEVICE — DRESSING POST OP MEPILEX AG 4X6  498300

## (undated) DEVICE — BANDAGE ACE STERILE 4 REB3114

## (undated) DEVICE — DRESSING TEGADERM 2 1/3 X 2 3/4 TD1002

## (undated) DEVICE — TUBING SUCTION FRESENIUS 9108484

## (undated) DEVICE — TUBING INSUFFLATION 10FT

## (undated) DEVICE — INSERT FOGARTY 86 SOFT86

## (undated) DEVICE — SOLUTION PREP IODINE 4OZ

## (undated) DEVICE — PACK PERFUSION

## (undated) DEVICE — SUTURE SILK 2 MH 30 K846H

## (undated) DEVICE — DRAPE SLUSH 66X44 ORS-330

## (undated) DEVICE — GUIDEWIRE DOUBLE ENDED .035 DIA. 150CML

## (undated) DEVICE — SUTURE PROLENE 7-0 BV-1 24 M8702

## (undated) DEVICE — SUTURE PROLENE 6-0 C-1 30 M8706

## (undated) DEVICE — DEGREASER SKIN MS-4000

## (undated) DEVICE — SPONGE KERLIX SUPER   NON25854

## (undated) DEVICE — SUTURE VICRYL 4-0 27 VCP935H

## (undated) DEVICE — SUTURE STEEL 6 18 M654G

## (undated) DEVICE — RESERVOIR FRESENIUS 9108474

## (undated) DEVICE — FILTER SUMP MEDISTINAL 28FR 111169AT

## (undated) DEVICE — CATHETER EXPO MLTPK 6FR 100X110CM

## (undated) DEVICE — CATHETER IV JELCO 14G X 2IN

## (undated) DEVICE — STAPLER SKIN NON ROTATE PXW35

## (undated) DEVICE — DRESSING POST OP MEPILEX AG 4X8

## (undated) DEVICE — COVER XRAY 24.5X24IN

## (undated) DEVICE — GLOVE BIOGELPI GOLD SIZE 7.5

## (undated) DEVICE — CANNULA VENOUS 33/43FR TF33430

## (undated) DEVICE — CANN. AORTIC EOPA  20FR 78320  EUBANKS

## (undated) DEVICE — SYSTEM VEIN HARVESTING VSP550

## (undated) DEVICE — CONNECTOR STRAIGHT 3/8 050506000

## (undated) DEVICE — CLIP APPLIER MSM20 STERILMED  ETHMSM20

## (undated) DEVICE — HEMOSTAT SURGICEL 4X8

## (undated) DEVICE — CONNECTOR 5-IN-1 CON2001

## (undated) DEVICE — TRAY HEART BREAUX SMHS014-08

## (undated) DEVICE — INTRODUCER PRELUDE ACT .038INX6FRX11CM

## (undated) DEVICE — BANDAGE ACE STERILE 6 REB3116

## (undated) DEVICE — COVER LIGHT HANDLE LB53

## (undated) DEVICE — BRUSH EZ SCRUB W/HIBICLEANS 371073

## (undated) DEVICE — SPONGE GAUZE 10S 4X4  442214

## (undated) DEVICE — DRESSING POST OP MEPILEX  AG  4X12

## (undated) DEVICE — STERISTRIP 1/2 R1547

## (undated) DEVICE — SUTURE VICRYL 2-0 CT-1 36 VCP945H

## (undated) DEVICE — PUNCH AORTIC 4.5  RCC-45

## (undated) DEVICE — ADHESIVE MASTISOL VIAL 0523-48

## (undated) DEVICE — CLIP APPLIER MCS20 STERILMED ETHMCS20

## (undated) DEVICE — SUTURE SILK 2-0 SH 18 CR C012D

## (undated) DEVICE — CANNULA ARTERIOTOMY 31002

## (undated) DEVICE — DRESSING TEGADERM 4X4 3/4 TD1004

## (undated) DEVICE — ALCOHOL 16OZ

## (undated) DEVICE — BAG DECANTER 10-102

## (undated) DEVICE — SUTURE VICRYL 3-0 UNDYED CT-1  J944H

## (undated) DEVICE — SET AUTOTRANSFUSION FRESENIUS 9005104

## (undated) DEVICE — SYRINGE BULB ASEPTO 60CC 2OZ

## (undated) DEVICE — BLANKET HYPOTHERMIA LARGE

## (undated) DEVICE — SUTURE VICRYL #0 36 VCP946H

## (undated) DEVICE — DRAIN CHEST OCEAN WATER SEAL 2050-000

## (undated) DEVICE — PAD BOVIE ADULT

## (undated) DEVICE — SOLUTION ANTIFOG FOG1001

## (undated) DEVICE — Device